# Patient Record
Sex: FEMALE | Race: WHITE | NOT HISPANIC OR LATINO | Employment: OTHER | URBAN - METROPOLITAN AREA
[De-identification: names, ages, dates, MRNs, and addresses within clinical notes are randomized per-mention and may not be internally consistent; named-entity substitution may affect disease eponyms.]

---

## 2017-06-14 ENCOUNTER — OFFICE VISIT (OUTPATIENT)
Dept: ALLERGY | Facility: CLINIC | Age: 77
End: 2017-06-14
Payer: COMMERCIAL

## 2017-06-14 VITALS
SYSTOLIC BLOOD PRESSURE: 106 MMHG | BODY MASS INDEX: 21.4 KG/M2 | WEIGHT: 133.19 LBS | DIASTOLIC BLOOD PRESSURE: 58 MMHG | HEIGHT: 66 IN | HEART RATE: 85 BPM

## 2017-06-14 DIAGNOSIS — E03.9 ACQUIRED HYPOTHYROIDISM: ICD-10-CM

## 2017-06-14 DIAGNOSIS — Z88.0 HISTORY OF PENICILLIN ALLERGY: Primary | ICD-10-CM

## 2017-06-14 DIAGNOSIS — J31.0 CHRONIC RHINITIS: ICD-10-CM

## 2017-06-14 DIAGNOSIS — I10 ESSENTIAL HYPERTENSION: ICD-10-CM

## 2017-06-14 PROCEDURE — 99244 OFF/OP CNSLTJ NEW/EST MOD 40: CPT | Mod: 25,S$GLB,, | Performed by: ALLERGY & IMMUNOLOGY

## 2017-06-14 PROCEDURE — 95018 ALL TSTG PERQ&IQ DRUGS/BIOL: CPT | Mod: S$GLB,,, | Performed by: ALLERGY & IMMUNOLOGY

## 2017-06-14 PROCEDURE — 99999 PR PBB SHADOW E&M-NEW PATIENT-LVL III: CPT | Mod: PBBFAC,,, | Performed by: ALLERGY & IMMUNOLOGY
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
## 2017-06-19 ENCOUNTER — HOSPITAL ENCOUNTER (OUTPATIENT)
Dept: RADIOLOGY | Facility: HOSPITAL | Age: 77
Discharge: HOME | End: 2017-06-19
Attending: INTERNAL MEDICINE
Payer: COMMERCIAL

## 2017-06-19 ENCOUNTER — OFFICE VISIT (OUTPATIENT)
Dept: INFECTIOUS DISEASES | Facility: CLINIC | Age: 77
End: 2017-06-19
Payer: COMMERCIAL

## 2017-06-19 ENCOUNTER — LAB VISIT (OUTPATIENT)
Dept: LAB | Facility: HOSPITAL | Age: 77
End: 2017-06-19
Attending: INTERNAL MEDICINE
Payer: COMMERCIAL

## 2017-06-19 VITALS
BODY MASS INDEX: 21.33 KG/M2 | HEIGHT: 66 IN | TEMPERATURE: 98 F | HEART RATE: 87 BPM | SYSTOLIC BLOOD PRESSURE: 139 MMHG | DIASTOLIC BLOOD PRESSURE: 64 MMHG | WEIGHT: 132.69 LBS

## 2017-06-19 DIAGNOSIS — M27.2 ODONTOGENIC INFECTION OF JAW: ICD-10-CM

## 2017-06-19 LAB
BASOPHILS # BLD AUTO: 0.02 K/UL
BASOPHILS NFR BLD: 0.2 %
CRP SERPL-MCNC: 2.9 MG/L
DIFFERENTIAL METHOD: ABNORMAL
EOSINOPHIL # BLD AUTO: 0.2 K/UL
EOSINOPHIL NFR BLD: 2.9 %
ERYTHROCYTE [DISTWIDTH] IN BLOOD BY AUTOMATED COUNT: 14.3 %
ERYTHROCYTE [SEDIMENTATION RATE] IN BLOOD BY WESTERGREN METHOD: 15 MM/HR
HCT VFR BLD AUTO: 38.9 %
HGB BLD-MCNC: 13.1 G/DL
LYMPHOCYTES # BLD AUTO: 2.3 K/UL
LYMPHOCYTES NFR BLD: 28 %
MCH RBC QN AUTO: 33.4 PG
MCHC RBC AUTO-ENTMCNC: 33.7 %
MCV RBC AUTO: 99 FL
MONOCYTES # BLD AUTO: 0.4 K/UL
MONOCYTES NFR BLD: 4.4 %
NEUTROPHILS # BLD AUTO: 5.3 K/UL
NEUTROPHILS NFR BLD: 64.4 %
PLATELET # BLD AUTO: 303 K/UL
PMV BLD AUTO: 10.2 FL
RBC # BLD AUTO: 3.92 M/UL
WBC # BLD AUTO: 8.22 K/UL

## 2017-06-19 PROCEDURE — 36415 COLL VENOUS BLD VENIPUNCTURE: CPT | Mod: PO

## 2017-06-19 PROCEDURE — 25500020 PHARM REV CODE 255: Performed by: INTERNAL MEDICINE

## 2017-06-19 PROCEDURE — 1126F AMNT PAIN NOTED NONE PRSNT: CPT | Mod: S$GLB,,, | Performed by: INTERNAL MEDICINE

## 2017-06-19 PROCEDURE — 99999 PR PBB SHADOW E&M-EST. PATIENT-LVL III: CPT | Mod: PBBFAC,,, | Performed by: INTERNAL MEDICINE

## 2017-06-19 PROCEDURE — 70488 CT MAXILLOFACIAL W/O & W/DYE: CPT | Mod: 26,,, | Performed by: RADIOLOGY

## 2017-06-19 PROCEDURE — 99204 OFFICE O/P NEW MOD 45 MIN: CPT | Mod: S$GLB,,, | Performed by: INTERNAL MEDICINE

## 2017-06-19 PROCEDURE — 85651 RBC SED RATE NONAUTOMATED: CPT

## 2017-06-19 PROCEDURE — 1159F MED LIST DOCD IN RCRD: CPT | Mod: S$GLB,,, | Performed by: INTERNAL MEDICINE

## 2017-06-19 PROCEDURE — 85025 COMPLETE CBC W/AUTO DIFF WBC: CPT

## 2017-06-19 PROCEDURE — 86140 C-REACTIVE PROTEIN: CPT

## 2017-06-19 PROCEDURE — 70488 CT MAXILLOFACIAL W/O & W/DYE: CPT | Mod: TC

## 2017-06-19 RX ORDER — OXYCODONE AND ACETAMINOPHEN 7.5; 325 MG/1; MG/1
1 TABLET ORAL
Refills: 0 | COMMUNITY
Start: 2017-06-12 | End: 2017-06-19

## 2017-06-19 RX ORDER — METRONIDAZOLE 500 MG/1
500 TABLET ORAL 3 TIMES DAILY
Refills: 0 | COMMUNITY
Start: 2017-06-06 | End: 2017-06-19

## 2017-06-19 RX ORDER — EFINACONAZOLE 100 MG/ML
SOLUTION TOPICAL
COMMUNITY
Start: 2017-06-12 | End: 2017-06-19

## 2017-06-19 RX ORDER — ESCITALOPRAM OXALATE 10 MG/1
TABLET ORAL
COMMUNITY
Start: 2017-05-22 | End: 2017-06-19

## 2017-06-19 RX ORDER — BENZOIN
TINCTURE TOPICAL
Refills: 0 | COMMUNITY
Start: 2017-06-13 | End: 2017-06-19

## 2017-06-19 RX ORDER — HYDROXYZINE PAMOATE 50 MG/1
CAPSULE ORAL
Refills: 0 | COMMUNITY
Start: 2017-05-06 | End: 2017-06-19

## 2017-06-19 RX ORDER — ONDANSETRON 4 MG/1
TABLET, FILM COATED ORAL
Refills: 0 | COMMUNITY
Start: 2017-05-31 | End: 2017-06-19

## 2017-06-19 RX ORDER — ESCITALOPRAM OXALATE 10 MG/1
10 TABLET ORAL
COMMUNITY

## 2017-06-19 RX ORDER — AMOXICILLIN AND CLAVULANATE POTASSIUM 875; 125 MG/1; MG/1
TABLET, FILM COATED ORAL
COMMUNITY
Start: 2017-06-14 | End: 2019-06-03

## 2017-06-19 RX ORDER — CHLORHEXIDINE GLUCONATE ORAL RINSE 1.2 MG/ML
SOLUTION DENTAL
Refills: 0 | COMMUNITY
Start: 2017-05-22 | End: 2017-06-19

## 2017-06-19 RX ORDER — SULFAMETHOXAZOLE AND TRIMETHOPRIM 800; 160 MG/1; MG/1
1 TABLET ORAL 2 TIMES DAILY
Refills: 0 | COMMUNITY
Start: 2017-06-07 | End: 2017-06-19

## 2017-06-19 RX ORDER — IBUPROFEN 800 MG/1
TABLET ORAL
Refills: 0 | COMMUNITY
Start: 2017-05-01 | End: 2017-06-19

## 2017-06-19 RX ORDER — TRAMADOL HYDROCHLORIDE 50 MG/1
50 TABLET ORAL EVERY 6 HOURS PRN
Refills: 0 | COMMUNITY
Start: 2017-05-02 | End: 2017-06-19

## 2017-06-19 RX ADMIN — IOHEXOL 75 ML: 350 INJECTION, SOLUTION INTRAVENOUS at 12:06

## 2017-06-19 NOTE — PROGRESS NOTES
Subjective:      Patient ID: Caity Ashby is a 77 y.o. female.    Chief Complaint:Wound Infection      History of Present Illness   referral from Dr. Amado: [phone]  Bone graft on 5/1: infected; graft removed  On multiple abx : including bactrim, flagyl, cipro, and clindamycin  Saw allergy and had PCN testing done; not allergic  Rxed augmentin and now doing much better.       Review of Systems   Constitution: Positive for weakness and malaise/fatigue. Negative for chills, decreased appetite, fever, night sweats, weight gain and weight loss.   HENT: Negative for congestion, ear pain, headaches, hearing loss, hoarse voice, sore throat and tinnitus.    Eyes: Negative for blurred vision, redness and visual disturbance.   Cardiovascular: Negative for chest pain, leg swelling and palpitations.   Respiratory: Negative for cough, hemoptysis, shortness of breath and sputum production.    Hematologic/Lymphatic: Negative for adenopathy. Does not bruise/bleed easily.   Skin: Negative for dry skin, itching, rash and suspicious lesions.   Musculoskeletal: Negative for back pain, joint pain, myalgias and neck pain.   Gastrointestinal: Negative for abdominal pain, constipation, diarrhea, heartburn, nausea and vomiting.   Genitourinary: Negative for dysuria, flank pain, frequency, hematuria, hesitancy and urgency.   Neurological: Negative for dizziness, numbness and paresthesias.   Psychiatric/Behavioral: Negative for depression and memory loss. The patient does not have insomnia and is not nervous/anxious.      Objective:   Physical Exam   Constitutional: She appears well-developed.   HENT:   Mouth/Throat: No oropharyngeal exudate.   Cardiovascular: Normal rate.    Pulmonary/Chest: Effort normal.   Abdominal: Soft.   Lymphadenopathy:     She has no cervical adenopathy.   Skin: Skin is warm and dry.   Vitals reviewed.    Assessment:       1. Odontogenic infection of jaw          Plan:         1. CT scan.  2. Labs  today.  3. Follow up after the above.

## 2017-06-19 NOTE — LETTER
June 22, 2017      ABEL Anglin III, MD  [address]  [facility] [address]           [facility]  [address]  [facility] [address]  Phone: [phone]  Fax: [phone]          Patient: Caity Ashby   MR Number: [id number]   YOB: 1940   Date of Visit: 6/19/2017       Dear Dr. ABEL Anglin III:    Thank you for referring Caity Ashby to me for evaluation. Attached you will find relevant portions of my assessment and plan of care.    If you have questions, please do not hesitate to call me. I look forward to following Caity Ashby along with you.    Sincerely,    Megha Harris  CC:  No Recipients    If you would like to receive this communication electronically, please contact [email or web address] or [phone] to request more information on [facility] access.    For providers and/or their staff who would like to refer a patient to [facility], please contact us through our one-stop-shop provider referral line, [facility], at [phone].    If you feel you have received this communication in error or would no longer like to receive these types of communications, please e-mail [email or web address]

## 2017-12-27 ENCOUNTER — CLINICAL SUPPORT (OUTPATIENT)
Dept: CARDIOLOGY | Facility: CLINIC | Age: 77
End: 2017-12-27
Payer: COMMERCIAL

## 2017-12-27 DIAGNOSIS — I10 ESSENTIAL HYPERTENSION: ICD-10-CM

## 2017-12-27 PROCEDURE — 93351 STRESS TTE COMPLETE: CPT | Mod: S$GLB,,, | Performed by: INTERNAL MEDICINE

## 2018-06-27 ENCOUNTER — OFFICE VISIT (OUTPATIENT)
Dept: CARDIOLOGY | Facility: CLINIC | Age: 78
End: 2018-06-27
Attending: INTERNAL MEDICINE
Payer: COMMERCIAL

## 2018-06-27 VITALS
HEART RATE: 70 BPM | HEIGHT: 66 IN | SYSTOLIC BLOOD PRESSURE: 144 MMHG | BODY MASS INDEX: 21.69 KG/M2 | DIASTOLIC BLOOD PRESSURE: 62 MMHG | WEIGHT: 135 LBS

## 2018-06-27 DIAGNOSIS — I10 ESSENTIAL HYPERTENSION: Primary | ICD-10-CM

## 2018-06-27 DIAGNOSIS — E03.9 ACQUIRED HYPOTHYROIDISM: ICD-10-CM

## 2018-06-27 PROCEDURE — 3078F DIAST BP <80 MM HG: CPT | Mod: CPTII,S$GLB,, | Performed by: INTERNAL MEDICINE

## 2018-06-27 PROCEDURE — 3077F SYST BP >= 140 MM HG: CPT | Mod: CPTII,S$GLB,, | Performed by: INTERNAL MEDICINE

## 2018-06-27 PROCEDURE — 99214 OFFICE O/P EST MOD 30 MIN: CPT | Mod: S$GLB,,, | Performed by: INTERNAL MEDICINE

## 2018-06-27 NOTE — PROGRESS NOTES
Subjective:    Patient ID:  Caity Ashby is a 78 y.o. female     HPI  here for follow-up of essential hypertension, acquired hypothyroidism.    I feel well.  We had to move out of our home with 21 boxes, when our house had been tented for termite treatment.  I have not had the time to work out like I normally do.  I was advised to take Topamax for my migraine, and this does not suit me.  I have gone back to taking Imitrex.    Current Outpatient Prescriptions   Medication Sig    amoxicillin-clavulanate 875-125mg (AUGMENTIN) 875-125 mg per tablet     escitalopram oxalate (LEXAPRO) 10 MG tablet Take 10 mg by mouth.    estrogens, conjugated, (PREMARIN) 0.625 MG tablet Take 0.625 mg by mouth once daily.    finasteride (PROPECIA) 1 mg tablet Take 1 mg by mouth.     levothyroxine (SYNTHROID) 125 MCG tablet take 1 tablet by mouth every morning    ramipril (ALTACE) 10 MG capsule TAKE 1 CAPSULE DAILY    sumatriptan (IMITREX) 100 MG tablet      No current facility-administered medications for this visit.        Review of Systems   Constitution: Negative for chills, decreased appetite, fever, weight gain and weight loss.   HENT: Negative for congestion, hearing loss and sore throat.    Eyes: Negative for blurred vision, double vision and visual disturbance.   Cardiovascular: Negative for chest pain, claudication, dyspnea on exertion, leg swelling, palpitations and syncope.   Respiratory: Negative for cough, hemoptysis, shortness of breath, sputum production and wheezing.    Endocrine: Negative for cold intolerance and heat intolerance.   Hematologic/Lymphatic: Negative for bleeding problem. Does not bruise/bleed easily.   Skin: Negative for color change, dry skin, flushing and itching.   Musculoskeletal: Negative for back pain, joint pain and myalgias.   Gastrointestinal: Negative for abdominal pain, anorexia, constipation, diarrhea, dysphagia, nausea and vomiting.        No bleeding per rectum   Genitourinary:  "Negative for dysuria, flank pain, frequency, hematuria and nocturia.   Neurological: Positive for headaches. Negative for dizziness, light-headedness, loss of balance, seizures and tremors.   Psychiatric/Behavioral: Negative for altered mental status and depression.         Vitals:    06/27/18 1202   BP: (!) 144/62   Pulse: 70   Weight: 61.2 kg (135 lb)   Height: 5' 6" (1.676 m)     Objective:    Physical Exam   Constitutional: She is oriented to person, place, and time. She appears well-developed and well-nourished.   HENT:   Head: Normocephalic and atraumatic.   Nose: Nose normal.   Mouth/Throat: Oropharynx is clear and moist.   Eyes: Conjunctivae and EOM are normal. Pupils are equal, round, and reactive to light.   Neck: Neck supple. No tracheal deviation present. No thyromegaly present.   Cardiovascular: Normal rate, regular rhythm and intact distal pulses.  Exam reveals no gallop and no friction rub.    No murmur heard.  Pulmonary/Chest: No respiratory distress. She has no wheezes. She has no rales. She exhibits no tenderness.   Abdominal: Soft. Bowel sounds are normal. She exhibits no distension and no mass. There is no tenderness. There is no rebound and no guarding.   Musculoskeletal: Normal range of motion.   Lymphadenopathy:     She has no cervical adenopathy.   Neurological: She is alert and oriented to person, place, and time.   Skin: Skin is warm and dry.   Psychiatric: Her behavior is normal.         Assessment:       1. Essential hypertension    2. Acquired hypothyroidism    3.      Migraine.     Plan:       Stable.  Reassured.  Continue the same medical regimen.            "

## 2018-07-03 RX ORDER — VALACYCLOVIR HYDROCHLORIDE 500 MG/1
TABLET, FILM COATED ORAL
Qty: 100 TABLET | Refills: 6 | Status: SHIPPED | OUTPATIENT
Start: 2018-07-03 | End: 2019-09-16 | Stop reason: SDUPTHER

## 2018-07-12 ENCOUNTER — PATIENT MESSAGE (OUTPATIENT)
Dept: CARDIOLOGY | Facility: CLINIC | Age: 78
End: 2018-07-12

## 2018-12-19 ENCOUNTER — PATIENT MESSAGE (OUTPATIENT)
Dept: CARDIOLOGY | Facility: CLINIC | Age: 78
End: 2018-12-19

## 2018-12-19 ENCOUNTER — OFFICE VISIT (OUTPATIENT)
Dept: CARDIOLOGY | Facility: CLINIC | Age: 78
End: 2018-12-19
Attending: INTERNAL MEDICINE
Payer: COMMERCIAL

## 2018-12-19 VITALS
WEIGHT: 138 LBS | HEART RATE: 76 BPM | SYSTOLIC BLOOD PRESSURE: 144 MMHG | HEIGHT: 66 IN | BODY MASS INDEX: 22.18 KG/M2 | DIASTOLIC BLOOD PRESSURE: 64 MMHG

## 2018-12-19 DIAGNOSIS — E03.9 ACQUIRED HYPOTHYROIDISM: ICD-10-CM

## 2018-12-19 DIAGNOSIS — T46.1X5D: ICD-10-CM

## 2018-12-19 DIAGNOSIS — I10 ESSENTIAL HYPERTENSION: Primary | ICD-10-CM

## 2018-12-19 DIAGNOSIS — M17.12 PRIMARY OSTEOARTHRITIS OF LEFT KNEE: ICD-10-CM

## 2018-12-19 PROCEDURE — 3077F SYST BP >= 140 MM HG: CPT | Mod: CPTII,S$GLB,, | Performed by: INTERNAL MEDICINE

## 2018-12-19 PROCEDURE — 3078F DIAST BP <80 MM HG: CPT | Mod: CPTII,S$GLB,, | Performed by: INTERNAL MEDICINE

## 2018-12-19 PROCEDURE — 99213 OFFICE O/P EST LOW 20 MIN: CPT | Mod: S$GLB,,, | Performed by: INTERNAL MEDICINE

## 2018-12-19 NOTE — PROGRESS NOTES
Subjective:    Patient ID:  Caity Ashby is a 78 y.o. female     HPI   Here for follow-up of essential hypertension, hypothyroidism, adverse effect of calcium channel blocker, osteoarthritis of left knee.    Aside from my left knee pain, I am doing well.  I have no other complaint.    Current Outpatient Medications   Medication Sig    amoxicillin-clavulanate 875-125mg (AUGMENTIN) 875-125 mg per tablet     escitalopram oxalate (LEXAPRO) 10 MG tablet Take 10 mg by mouth.    estrogens, conjugated, (PREMARIN) 0.625 MG tablet Take 0.625 mg by mouth once daily.    finasteride (PROPECIA) 1 mg tablet Take 1 mg by mouth.     levothyroxine (SYNTHROID) 125 MCG tablet take 1 tablet by mouth every morning    ramipril (ALTACE) 10 MG capsule TAKE 1 CAPSULE DAILY    sumatriptan (IMITREX) 100 MG tablet     valACYclovir (VALTREX) 500 MG tablet take 1 tablet by mouth once daily     No current facility-administered medications for this visit.          Review of Systems   Constitution: Negative for chills, decreased appetite, fever, weight gain and weight loss.   HENT: Negative for congestion, hearing loss and sore throat.    Eyes: Negative for blurred vision, double vision and visual disturbance.   Cardiovascular: Negative for chest pain, claudication, dyspnea on exertion, leg swelling, palpitations and syncope.   Respiratory: Negative for cough, hemoptysis, shortness of breath, sputum production and wheezing.    Endocrine: Negative for cold intolerance and heat intolerance.   Hematologic/Lymphatic: Negative for bleeding problem. Does not bruise/bleed easily.   Skin: Negative for color change, dry skin, flushing and itching.   Musculoskeletal: Positive for joint pain. Negative for back pain and myalgias.   Gastrointestinal: Negative for abdominal pain, anorexia, constipation, diarrhea, dysphagia, nausea and vomiting.        No bleeding per rectum   Genitourinary: Negative for dysuria, flank pain, frequency, hematuria  "and nocturia.   Neurological: Negative for dizziness, headaches, light-headedness, loss of balance, seizures and tremors.   Psychiatric/Behavioral: Negative for altered mental status and depression.         Vitals:    12/19/18 1201   BP: (!) 144/64   Pulse: 76   Weight: 62.6 kg (138 lb)   Height: 5' 6" (1.676 m)     Objective:    Physical Exam   Constitutional: She is oriented to person, place, and time. She appears well-developed and well-nourished.   HENT:   Head: Normocephalic and atraumatic.   Nose: Nose normal.   Mouth/Throat: Oropharynx is clear and moist.   Eyes: Conjunctivae and EOM are normal. Pupils are equal, round, and reactive to light.   Neck: Neck supple. No tracheal deviation present. No thyromegaly present.   Cardiovascular: Normal rate, regular rhythm and intact distal pulses. Exam reveals no gallop and no friction rub.   No murmur heard.  Pulmonary/Chest: No respiratory distress. She has no wheezes. She has no rales. She exhibits no tenderness.   Abdominal: Soft. Bowel sounds are normal. She exhibits no distension and no mass. There is no tenderness. There is no rebound and no guarding.   Musculoskeletal: Normal range of motion.   Lymphadenopathy:     She has no cervical adenopathy.   Neurological: She is alert and oriented to person, place, and time.   Skin: Skin is warm and dry.   Psychiatric: Her behavior is normal.         Assessment:       1. Essential hypertension    2. Acquired hypothyroidism    3. Adverse effect of calcium-channel blocker, subsequent encounter    4. Primary osteoarthritis of left knee         Plan:       Stable.  Continue the same pharmacological regimen.            "

## 2019-05-18 DIAGNOSIS — E03.9 HYPOTHYROIDISM: ICD-10-CM

## 2019-05-20 RX ORDER — LEVOTHYROXINE SODIUM 125 UG/1
TABLET ORAL
Qty: 90 TABLET | Refills: 0 | Status: SHIPPED | OUTPATIENT
Start: 2019-05-20 | End: 2019-08-16 | Stop reason: SDUPTHER

## 2019-05-24 DIAGNOSIS — I10 ESSENTIAL HYPERTENSION: Primary | ICD-10-CM

## 2019-05-24 RX ORDER — LOSARTAN POTASSIUM 100 MG/1
100 TABLET ORAL DAILY
Qty: 90 TABLET | Refills: 3 | Status: SHIPPED | OUTPATIENT
Start: 2019-05-24 | End: 2020-05-18

## 2019-05-24 RX ORDER — LOSARTAN POTASSIUM 100 MG/1
100 TABLET ORAL DAILY
COMMUNITY
End: 2019-05-24 | Stop reason: SDUPTHER

## 2019-06-03 ENCOUNTER — OFFICE VISIT (OUTPATIENT)
Dept: ALLERGY | Facility: CLINIC | Age: 79
End: 2019-06-03
Payer: COMMERCIAL

## 2019-06-03 VITALS
DIASTOLIC BLOOD PRESSURE: 70 MMHG | SYSTOLIC BLOOD PRESSURE: 118 MMHG | WEIGHT: 136 LBS | BODY MASS INDEX: 21.86 KG/M2 | HEIGHT: 66 IN

## 2019-06-03 DIAGNOSIS — E03.9 ACQUIRED HYPOTHYROIDISM: ICD-10-CM

## 2019-06-03 DIAGNOSIS — T78.3XXA ANGIOEDEMA, INITIAL ENCOUNTER: Primary | ICD-10-CM

## 2019-06-03 DIAGNOSIS — T50.905A ADVERSE EFFECT OF DRUG, INITIAL ENCOUNTER: ICD-10-CM

## 2019-06-03 DIAGNOSIS — I10 ESSENTIAL HYPERTENSION: ICD-10-CM

## 2019-06-03 PROCEDURE — 3078F PR MOST RECENT DIASTOLIC BLOOD PRESSURE < 80 MM HG: ICD-10-PCS | Mod: CPTII,S$GLB,, | Performed by: ALLERGY & IMMUNOLOGY

## 2019-06-03 PROCEDURE — 99999 PR PBB SHADOW E&M-EST. PATIENT-LVL III: CPT | Mod: PBBFAC,,, | Performed by: ALLERGY & IMMUNOLOGY

## 2019-06-03 PROCEDURE — 3078F DIAST BP <80 MM HG: CPT | Mod: CPTII,S$GLB,, | Performed by: ALLERGY & IMMUNOLOGY

## 2019-06-03 PROCEDURE — 3074F SYST BP LT 130 MM HG: CPT | Mod: CPTII,S$GLB,, | Performed by: ALLERGY & IMMUNOLOGY

## 2019-06-03 PROCEDURE — 99214 OFFICE O/P EST MOD 30 MIN: CPT | Mod: S$GLB,,, | Performed by: ALLERGY & IMMUNOLOGY

## 2019-06-03 PROCEDURE — 3074F PR MOST RECENT SYSTOLIC BLOOD PRESSURE < 130 MM HG: ICD-10-PCS | Mod: CPTII,S$GLB,, | Performed by: ALLERGY & IMMUNOLOGY

## 2019-06-03 PROCEDURE — 99999 PR PBB SHADOW E&M-EST. PATIENT-LVL III: ICD-10-PCS | Mod: PBBFAC,,, | Performed by: ALLERGY & IMMUNOLOGY

## 2019-06-03 PROCEDURE — 99214 PR OFFICE/OUTPT VISIT, EST, LEVL IV, 30-39 MIN: ICD-10-PCS | Mod: S$GLB,,, | Performed by: ALLERGY & IMMUNOLOGY

## 2019-06-03 NOTE — PROGRESS NOTES
Caity Carver returns to clinic today for continued evaluation of a drug allergy.  She is here with her  who was also a patient.  She was last seen June 14, 2017.    At her last visit, she was tested for penicillin was negative.  She subsequently has had on several occasions without any difficulty.    She has been on ramipril for 15 years for hypertension.    On May 25, 2019 she woke up around 2:00 a.m. and had some swelling of her face.  She went back to sleep but when she woke up around 8:00 a.m. she had had more significant swelling.  She did not have any urticaria.    She went to the urgent care center and was told that it was probably due to her ACE inhibitor.  She was given prednisone, famotidine, and Benadryl with resolution.    She has not had since.    She is now taking losartan.    She is concerned about a possible shrimp allergy.  She had eaten them the day before for luncharound noon.  She had leftover is that night around 7:00 p.m..    She has been avoiding all crustacea and bivalves.    She denies any rhinitis or conjunctivitis.  She denies any cough, wheezing, or shortness of breath.    [facility] ALLERGY QUESTIONNAIRE SHORT 6/3/2019   Are you taking any new medications since your last visit? Yes   Constitution: No changes since my last visit with this provider   Head or facial pain: No changes since my last visit with this provider   Eyes: No symptoms   Ears: No symptoms   Nose: No symptoms   Throat: No symptoms   Sinuses: No symptoms   Lungs: No symptoms   Skin: Swelling   Cardiovascular: High blood pressue   Gastrointestinal: No symptoms   Genital/ urinary No symptoms   Musculoskeletal: No symptoms   Neurologic: No symptoms   Endocrine: No symptoms   Hematologic: No symptoms     Physical Examination:  General: Well-developed, well-nourished, no acute distress.  Head: No sinus tenderness.  Eyes: Conjunctivae:  No bulbar or palpebral conjunctival injection.  Ears: EAC's clear.  TM's  clear.  No pre-auricular nodes.  Nose: Nasal Mucosa:  Pink.  Septum: No apparent deviation.  Turbinates:  No significant edema.  Polyps/Mass:  None visible.  Teeth/Gums:  No bleeding noted.  Oropharynx: Lower left gum with purulence.  Neck: Supple without thyromegaly. No cervical lymphadenopathy.    Respiratory/Chest: Effort: Good.  Auscultation:  Clear bilaterally.  Skin: Good turgor.  No urticaria or angioedema.  Neuro/Psych: Oriented x 3.    Penicillin skin testing prick and intradermal: 3+ histamine control. All tests were negative.    Assessment:  1.  Acute angioedema, probably secondary to Ace inhibitor.  2.  Doubt food allergy.  3. History of penicillin allergy with negative skin tests and subsequent tolerance of Penicillin.  4. S/P bone graft with local infection.  5. History of allergic rhinitis.  6. Hypertension on Altace.  7. Hypothyroidism on replacement.    Recommendations:  1.  Laboratory as ordered.    2.  Avoid crustacea and bivalves for now.  3.  Skin test off antihistamines if needed.  4.  Return to clinic in one week.

## 2019-06-13 ENCOUNTER — OFFICE VISIT (OUTPATIENT)
Dept: ALLERGY | Facility: CLINIC | Age: 79
End: 2019-06-13
Payer: COMMERCIAL

## 2019-06-13 VITALS
OXYGEN SATURATION: 98 % | SYSTOLIC BLOOD PRESSURE: 140 MMHG | DIASTOLIC BLOOD PRESSURE: 80 MMHG | HEART RATE: 59 BPM | BODY MASS INDEX: 22.46 KG/M2 | HEIGHT: 66 IN | WEIGHT: 139.75 LBS

## 2019-06-13 DIAGNOSIS — E03.9 ACQUIRED HYPOTHYROIDISM: ICD-10-CM

## 2019-06-13 DIAGNOSIS — T78.3XXD ANGIOEDEMA, SUBSEQUENT ENCOUNTER: Primary | ICD-10-CM

## 2019-06-13 DIAGNOSIS — T50.905D ADVERSE EFFECT OF DRUG, SUBSEQUENT ENCOUNTER: ICD-10-CM

## 2019-06-13 DIAGNOSIS — I10 ESSENTIAL HYPERTENSION: ICD-10-CM

## 2019-06-13 PROCEDURE — 1101F PR PT FALLS ASSESS DOC 0-1 FALLS W/OUT INJ PAST YR: ICD-10-PCS | Mod: CPTII,S$GLB,, | Performed by: ALLERGY & IMMUNOLOGY

## 2019-06-13 PROCEDURE — 99999 PR PBB SHADOW E&M-EST. PATIENT-LVL III: ICD-10-PCS | Mod: PBBFAC,,, | Performed by: ALLERGY & IMMUNOLOGY

## 2019-06-13 PROCEDURE — 3077F SYST BP >= 140 MM HG: CPT | Mod: CPTII,S$GLB,, | Performed by: ALLERGY & IMMUNOLOGY

## 2019-06-13 PROCEDURE — 3077F PR MOST RECENT SYSTOLIC BLOOD PRESSURE >= 140 MM HG: ICD-10-PCS | Mod: CPTII,S$GLB,, | Performed by: ALLERGY & IMMUNOLOGY

## 2019-06-13 PROCEDURE — 3079F DIAST BP 80-89 MM HG: CPT | Mod: CPTII,S$GLB,, | Performed by: ALLERGY & IMMUNOLOGY

## 2019-06-13 PROCEDURE — 99214 OFFICE O/P EST MOD 30 MIN: CPT | Mod: S$GLB,,, | Performed by: ALLERGY & IMMUNOLOGY

## 2019-06-13 PROCEDURE — 99999 PR PBB SHADOW E&M-EST. PATIENT-LVL III: CPT | Mod: PBBFAC,,, | Performed by: ALLERGY & IMMUNOLOGY

## 2019-06-13 PROCEDURE — 3079F PR MOST RECENT DIASTOLIC BLOOD PRESSURE 80-89 MM HG: ICD-10-PCS | Mod: CPTII,S$GLB,, | Performed by: ALLERGY & IMMUNOLOGY

## 2019-06-13 PROCEDURE — 99214 PR OFFICE/OUTPT VISIT, EST, LEVL IV, 30-39 MIN: ICD-10-PCS | Mod: S$GLB,,, | Performed by: ALLERGY & IMMUNOLOGY

## 2019-06-13 PROCEDURE — 1101F PT FALLS ASSESS-DOCD LE1/YR: CPT | Mod: CPTII,S$GLB,, | Performed by: ALLERGY & IMMUNOLOGY

## 2019-06-13 RX ORDER — EPINEPHRINE 0.3 MG/.3ML
1 INJECTION SUBCUTANEOUS ONCE
Qty: 2 DEVICE | Refills: 5 | Status: SHIPPED | OUTPATIENT
Start: 2019-06-13 | End: 2023-01-21

## 2019-06-13 NOTE — PROGRESS NOTES
Caity Carver returns to clinic today for continued evaluation of angioedema.  She is here with her  who was also a patient.  She was last seen Cinthia 3, 2019.    She had an episode of angioedema on May 25, 2019.  She was on ramipril and this was discontinued.  She had also eaten shrimp that day and was concerned about a possible food allergy.    She has not had any further episodes of swelling.  She has not had any urticaria.    She saw her results of her blood work online and they were negative.  We had scheduled skin testing for today.    She however when had ate shrimp without any difficulty.  She also has been eating oysters and lobster.    She has no longer concerned about a food allergy.    She does not have an EpiPen.  Her  would feel more comfortable if she did.    She denies any rhinitis or conjunctivitis.  She denies any cough, wheezing, or shortness of breath.  She denies any asthma.    [facility] ALLERGY QUESTIONNAIRE SHORT 6/13/2019   Are you taking any new medications since your last visit? Yes   Constitution: No changes since my last visit with this provider   Head or facial pain: No changes since my last visit with this provider   Eyes: No symptoms   Ears: No symptoms   Nose: Runny nose   Throat: No symptoms   Sinuses: No symptoms   Lungs: No symptoms   Skin: Swelling   Cardiovascular: High blood pressue   Gastrointestinal: No symptoms   Genital/ urinary No symptoms   Musculoskeletal: No symptoms   Neurologic: No symptoms   Endocrine: No symptoms   Hematologic: No symptoms     Physical Examination:  General: Well-developed, well-nourished, no acute distress.  Head: No sinus tenderness.  Eyes: Conjunctivae:  No bulbar or palpebral conjunctival injection.  Ears: EAC's clear.  TM's clear.  No pre-auricular nodes.  Nose: Nasal Mucosa:  Pink.  Septum: No apparent deviation.  Turbinates:  No significant edema.  Polyps/Mass:  None visible.  Teeth/Gums:  No bleeding noted.  Oropharynx:  Lower left gum with purulence.  Neck: Supple without thyromegaly. No cervical lymphadenopathy.    Respiratory/Chest: Effort: Good.  Auscultation:  Clear bilaterally.  Skin: Good turgor.  No urticaria or angioedema.  Neuro/Psych: Oriented x 3.    Penicillin skin testing prick and intradermal: 3+ histamine control. All tests were negative.    Assessment:  1.  Acute angioedema, probably secondary to ACE inhibitor, resolved.  2.  Doubt food allergy.  3.  History of penicillin allergy with negative skin tests and subsequent tolerance of Penicillin.  4.  S/P bone graft with local infection.  5.  History of allergic rhinitis.  6.  Hypertension on Altace.  7.  Hypothyroidism on replacement.    Recommendations:  1.  Discussed skin testing today and as she has eaten shrimp, lobster, and oyster without any difficulty we will not do these.  2.  EpiPen.  3.  Discussed that reactions to ACE inhibitors may occur after this drug is discontinued.  4.  Resume normal diet.  5.  Return to clinic as needed.    EpiPen technique was reviewed.

## 2019-06-19 ENCOUNTER — OFFICE VISIT (OUTPATIENT)
Dept: CARDIOLOGY | Facility: CLINIC | Age: 79
End: 2019-06-19
Attending: INTERNAL MEDICINE
Payer: COMMERCIAL

## 2019-06-19 VITALS
DIASTOLIC BLOOD PRESSURE: 70 MMHG | BODY MASS INDEX: 22.5 KG/M2 | HEIGHT: 66 IN | SYSTOLIC BLOOD PRESSURE: 135 MMHG | WEIGHT: 140 LBS | HEART RATE: 67 BPM

## 2019-06-19 DIAGNOSIS — T46.1X5D: ICD-10-CM

## 2019-06-19 DIAGNOSIS — T46.4X5A ANGIOEDEMA DUE TO ANGIOTENSIN CONVERTING ENZYME INHIBITOR (ACE-I): ICD-10-CM

## 2019-06-19 DIAGNOSIS — T78.3XXA ANGIOEDEMA DUE TO ANGIOTENSIN CONVERTING ENZYME INHIBITOR (ACE-I): ICD-10-CM

## 2019-06-19 DIAGNOSIS — I10 ESSENTIAL HYPERTENSION: Primary | ICD-10-CM

## 2019-06-19 DIAGNOSIS — E03.9 ACQUIRED HYPOTHYROIDISM: ICD-10-CM

## 2019-06-19 PROCEDURE — 1101F PT FALLS ASSESS-DOCD LE1/YR: CPT | Mod: CPTII,S$GLB,, | Performed by: INTERNAL MEDICINE

## 2019-06-19 PROCEDURE — 3078F DIAST BP <80 MM HG: CPT | Mod: CPTII,S$GLB,, | Performed by: INTERNAL MEDICINE

## 2019-06-19 PROCEDURE — 1101F PR PT FALLS ASSESS DOC 0-1 FALLS W/OUT INJ PAST YR: ICD-10-PCS | Mod: CPTII,S$GLB,, | Performed by: INTERNAL MEDICINE

## 2019-06-19 PROCEDURE — 3075F PR MOST RECENT SYSTOLIC BLOOD PRESS GE 130-139MM HG: ICD-10-PCS | Mod: CPTII,S$GLB,, | Performed by: INTERNAL MEDICINE

## 2019-06-19 PROCEDURE — 3078F PR MOST RECENT DIASTOLIC BLOOD PRESSURE < 80 MM HG: ICD-10-PCS | Mod: CPTII,S$GLB,, | Performed by: INTERNAL MEDICINE

## 2019-06-19 PROCEDURE — 99214 OFFICE O/P EST MOD 30 MIN: CPT | Mod: S$GLB,,, | Performed by: INTERNAL MEDICINE

## 2019-06-19 PROCEDURE — 99214 PR OFFICE/OUTPT VISIT, EST, LEVL IV, 30-39 MIN: ICD-10-PCS | Mod: S$GLB,,, | Performed by: INTERNAL MEDICINE

## 2019-06-19 PROCEDURE — 3075F SYST BP GE 130 - 139MM HG: CPT | Mod: CPTII,S$GLB,, | Performed by: INTERNAL MEDICINE

## 2019-06-20 NOTE — PROGRESS NOTES
Subjective:    Patient ID:  Caity Ashby is a 79 y.o. female     HPI   Here for follow-up of hypertension, hypothyroidism, recent angioedema due to angiotensin-converting enzyme inhibitor, adverse effect of calcium channel blocker.    After the angioedema, I stopped the ramipril and have tolerated losartan pretty well.  I went to see Dr. Anglin, and he ruled out an allergy to seafood.  My left knee hurts but topical magnesium seems to help.  I am due to have oral surgery.    Current Outpatient Medications   Medication Sig    EPINEPHrine (EPIPEN) 0.3 mg/0.3 mL AtIn Inject 0.3 mLs (0.3 mg total) into the muscle once. for 1 dose    escitalopram oxalate (LEXAPRO) 10 MG tablet Take 10 mg by mouth.    estrogens, conjugated, (PREMARIN) 0.625 MG tablet Take 0.625 mg by mouth once daily.    finasteride (PROPECIA) 1 mg tablet Take 1 mg by mouth.     levothyroxine (SYNTHROID) 125 MCG tablet TAKE 1 TABLET BY MOUTH EVERY MORNING    losartan (COZAAR) 100 MG tablet Take 1 tablet (100 mg total) by mouth once daily.    sumatriptan (IMITREX) 100 MG tablet     valACYclovir (VALTREX) 500 MG tablet take 1 tablet by mouth once daily     No current facility-administered medications for this visit.          Review of Systems   Constitution: Negative for chills, decreased appetite, fever, weight gain and weight loss.   HENT: Negative for congestion, hearing loss and sore throat.    Eyes: Negative for blurred vision, double vision and visual disturbance.   Cardiovascular: Negative for chest pain, claudication, dyspnea on exertion, leg swelling, palpitations and syncope.   Respiratory: Negative for cough, hemoptysis, shortness of breath, sputum production and wheezing.    Endocrine: Negative for cold intolerance and heat intolerance.   Hematologic/Lymphatic: Negative for bleeding problem. Does not bruise/bleed easily.   Skin: Negative for color change, dry skin, flushing and itching.   Musculoskeletal: Negative for back pain,  "joint pain and myalgias.   Gastrointestinal: Negative for abdominal pain, anorexia, constipation, diarrhea, dysphagia, nausea and vomiting.        No bleeding per rectum   Genitourinary: Negative for dysuria, flank pain, frequency, hematuria and nocturia.   Neurological: Negative for dizziness, headaches, light-headedness, loss of balance, seizures and tremors.   Psychiatric/Behavioral: Negative for altered mental status and depression.         Vitals:    06/19/19 1222   BP: 135/70   Pulse: 67   Weight: 63.5 kg (140 lb)   Height: 5' 6" (1.676 m)     Objective:    Physical Exam   Constitutional: She is oriented to person, place, and time. She appears well-developed and well-nourished.   HENT:   Head: Normocephalic and atraumatic.   Nose: Nose normal.   Mouth/Throat: Oropharynx is clear and moist.   Eyes: Pupils are equal, round, and reactive to light. Conjunctivae and EOM are normal.   Neck: Neck supple. No tracheal deviation present. No thyromegaly present.   Cardiovascular: Normal rate, regular rhythm and intact distal pulses. Exam reveals no gallop and no friction rub.   No murmur heard.  Pulmonary/Chest: No respiratory distress. She has no wheezes. She has no rales. She exhibits no tenderness.   Abdominal: Soft. Bowel sounds are normal. She exhibits no distension and no mass. There is no tenderness. There is no rebound and no guarding.   Musculoskeletal: Normal range of motion.   Lymphadenopathy:     She has no cervical adenopathy.   Neurological: She is alert and oriented to person, place, and time.   Skin: Skin is warm and dry.   Psychiatric: Her behavior is normal.         Assessment:       1. Essential hypertension    2. Angioedema due to angiotensin converting enzyme inhibitor (ACE-I)    3. Acquired hypothyroidism    4. Adverse effect of calcium-channel blocker, subsequent encounter         Plan:       Stable.  Continue present pharmacological regimen.            "

## 2019-08-16 DIAGNOSIS — E03.9 HYPOTHYROIDISM: ICD-10-CM

## 2019-08-19 RX ORDER — LEVOTHYROXINE SODIUM 125 UG/1
TABLET ORAL
Qty: 90 TABLET | Refills: 0 | Status: SHIPPED | OUTPATIENT
Start: 2019-08-19 | End: 2019-11-13 | Stop reason: SDUPTHER

## 2019-09-16 RX ORDER — VALACYCLOVIR HYDROCHLORIDE 500 MG/1
TABLET, FILM COATED ORAL
Qty: 100 TABLET | Refills: 0 | Status: SHIPPED | OUTPATIENT
Start: 2019-09-16 | End: 2020-03-17

## 2019-09-30 ENCOUNTER — OFFICE VISIT (OUTPATIENT)
Dept: CARDIOLOGY | Facility: CLINIC | Age: 79
End: 2019-09-30
Attending: INTERNAL MEDICINE
Payer: COMMERCIAL

## 2019-09-30 VITALS
HEART RATE: 68 BPM | HEIGHT: 66 IN | WEIGHT: 141 LBS | BODY MASS INDEX: 22.66 KG/M2 | DIASTOLIC BLOOD PRESSURE: 70 MMHG | SYSTOLIC BLOOD PRESSURE: 140 MMHG

## 2019-09-30 DIAGNOSIS — T46.1X5D: ICD-10-CM

## 2019-09-30 DIAGNOSIS — T46.4X5A ANGIOEDEMA DUE TO ANGIOTENSIN CONVERTING ENZYME INHIBITOR (ACE-I): ICD-10-CM

## 2019-09-30 DIAGNOSIS — E03.9 ACQUIRED HYPOTHYROIDISM: ICD-10-CM

## 2019-09-30 DIAGNOSIS — T78.3XXA ANGIOEDEMA DUE TO ANGIOTENSIN CONVERTING ENZYME INHIBITOR (ACE-I): ICD-10-CM

## 2019-09-30 DIAGNOSIS — I10 ESSENTIAL HYPERTENSION: ICD-10-CM

## 2019-09-30 DIAGNOSIS — I10 ESSENTIAL HYPERTENSION: Primary | ICD-10-CM

## 2019-09-30 PROCEDURE — 1101F PR PT FALLS ASSESS DOC 0-1 FALLS W/OUT INJ PAST YR: ICD-10-PCS | Mod: CPTII,S$GLB,, | Performed by: INTERNAL MEDICINE

## 2019-09-30 PROCEDURE — 99214 PR OFFICE/OUTPT VISIT, EST, LEVL IV, 30-39 MIN: ICD-10-PCS | Mod: 25,S$GLB,, | Performed by: INTERNAL MEDICINE

## 2019-09-30 PROCEDURE — 93000 PR ELECTROCARDIOGRAM, COMPLETE: ICD-10-PCS | Mod: S$GLB,,, | Performed by: INTERNAL MEDICINE

## 2019-09-30 PROCEDURE — 3077F PR MOST RECENT SYSTOLIC BLOOD PRESSURE >= 140 MM HG: ICD-10-PCS | Mod: CPTII,S$GLB,, | Performed by: INTERNAL MEDICINE

## 2019-09-30 PROCEDURE — 1101F PT FALLS ASSESS-DOCD LE1/YR: CPT | Mod: CPTII,S$GLB,, | Performed by: INTERNAL MEDICINE

## 2019-09-30 PROCEDURE — 3078F PR MOST RECENT DIASTOLIC BLOOD PRESSURE < 80 MM HG: ICD-10-PCS | Mod: CPTII,S$GLB,, | Performed by: INTERNAL MEDICINE

## 2019-09-30 PROCEDURE — 3078F DIAST BP <80 MM HG: CPT | Mod: CPTII,S$GLB,, | Performed by: INTERNAL MEDICINE

## 2019-09-30 PROCEDURE — 99214 OFFICE O/P EST MOD 30 MIN: CPT | Mod: 25,S$GLB,, | Performed by: INTERNAL MEDICINE

## 2019-09-30 PROCEDURE — 3077F SYST BP >= 140 MM HG: CPT | Mod: CPTII,S$GLB,, | Performed by: INTERNAL MEDICINE

## 2019-09-30 PROCEDURE — 93000 ELECTROCARDIOGRAM COMPLETE: CPT | Mod: S$GLB,,, | Performed by: INTERNAL MEDICINE

## 2019-09-30 RX ORDER — DILTIAZEM HYDROCHLORIDE 120 MG/1
120 CAPSULE, EXTENDED RELEASE ORAL DAILY
COMMUNITY
End: 2019-09-30 | Stop reason: SDUPTHER

## 2019-09-30 RX ORDER — HYDROCHLOROTHIAZIDE 12.5 MG/1
12.5 TABLET ORAL DAILY
Qty: 30 TABLET | Refills: 6 | Status: SHIPPED | OUTPATIENT
Start: 2019-09-30 | End: 2020-04-09

## 2019-09-30 RX ORDER — DILTIAZEM HYDROCHLORIDE 120 MG/1
120 CAPSULE, EXTENDED RELEASE ORAL DAILY
Qty: 30 CAPSULE | Refills: 6 | Status: SHIPPED | OUTPATIENT
Start: 2019-09-30 | End: 2019-09-30

## 2019-09-30 NOTE — PROGRESS NOTES
Subjective:    Patient ID:  Caity Ashby is a 79 y.o. female     HPI here for follow-up of hypertension, hypothyroidism, angioedema following Ace inhibitors, adverse effect of calcium channel blocker.    I feel well however each afternoon my blood pressure goes high.  Now off Altace and on losartan 100 mg.    Current Outpatient Medications   Medication Sig    diltiaZEM (DILACOR XR) 120 MG CDCR Take 1 capsule (120 mg total) by mouth once daily. (Patient not taking: Reported on 9/30/2019)    EPINEPHrine (EPIPEN) 0.3 mg/0.3 mL AtIn Inject 0.3 mLs (0.3 mg total) into the muscle once. for 1 dose    escitalopram oxalate (LEXAPRO) 10 MG tablet Take 10 mg by mouth.    estrogens, conjugated, (PREMARIN) 0.625 MG tablet Take 0.625 mg by mouth once daily.    finasteride (PROPECIA) 1 mg tablet Take 1 mg by mouth.     hydroCHLOROthiazide (HYDRODIURIL) 12.5 MG Tab Take 1 tablet (12.5 mg total) by mouth once daily.    levothyroxine (SYNTHROID) 125 MCG tablet TAKE 1 TABLET BY MOUTH EVERY MORNING    losartan (COZAAR) 100 MG tablet Take 1 tablet (100 mg total) by mouth once daily.    sumatriptan (IMITREX) 100 MG tablet     valACYclovir (VALTREX) 500 MG tablet TAKE 1 TABLET BY MOUTH ONCE DAILY     No current facility-administered medications for this visit.            Review of Systems   Constitution: Negative for chills, decreased appetite, fever, weight gain and weight loss.   HENT: Negative for congestion, hearing loss and sore throat.    Eyes: Negative for blurred vision, double vision and visual disturbance.   Cardiovascular: Negative for chest pain, claudication, dyspnea on exertion, leg swelling, palpitations and syncope.   Respiratory: Negative for cough, hemoptysis, shortness of breath, sputum production and wheezing.    Endocrine: Negative for cold intolerance and heat intolerance.   Hematologic/Lymphatic: Negative for bleeding problem. Does not bruise/bleed easily.   Skin: Negative for color change, dry  "skin, flushing and itching.   Musculoskeletal: Negative for back pain, joint pain and myalgias.   Gastrointestinal: Negative for abdominal pain, anorexia, constipation, diarrhea, dysphagia, nausea and vomiting.        No bleeding per rectum   Genitourinary: Negative for dysuria, flank pain, frequency, hematuria and nocturia.   Neurological: Negative for dizziness, headaches, light-headedness, loss of balance, seizures and tremors.   Psychiatric/Behavioral: Negative for altered mental status and depression.         Vitals:    09/30/19 1424 09/30/19 1429   BP: (!) 140/70 (!) 140/70   Pulse: 68 68   Weight: 68.5 kg (151 lb) 64 kg (141 lb)   Height: 5' 6" (1.676 m) 5' 6" (1.676 m)     Objective:    Physical Exam   Constitutional: She is oriented to person, place, and time. She appears well-developed and well-nourished.   HENT:   Head: Normocephalic and atraumatic.   Nose: Nose normal.   Mouth/Throat: Oropharynx is clear and moist.   Eyes: Pupils are equal, round, and reactive to light. Conjunctivae and EOM are normal.   Neck: Neck supple. No tracheal deviation present. No thyromegaly present.   Cardiovascular: Normal rate, regular rhythm and intact distal pulses. Exam reveals no gallop and no friction rub.   No murmur heard.  Pulmonary/Chest: No respiratory distress. She has no wheezes. She has no rales. She exhibits no tenderness.   Abdominal: Soft. Bowel sounds are normal. She exhibits no distension and no mass. There is no tenderness. There is no rebound and no guarding.   Musculoskeletal: Normal range of motion.   Lymphadenopathy:     She has no cervical adenopathy.   Neurological: She is alert and oriented to person, place, and time.   Skin: Skin is warm and dry.   Psychiatric: Her behavior is normal.         Assessment:       1. Essential hypertension    2. Acquired hypothyroidism    3. 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 Adverse effect of calcium-channel blocker, subsequent " encounter         Plan:       Although she does not recall why she had stop the diltiazem, or review of my notes suggests that she had an adverse reaction to the diltiazem in the past.  Plan HCTZ 12.5 mg daily.  Continue to monitor the blood pressure at home.

## 2019-11-13 DIAGNOSIS — E03.9 HYPOTHYROIDISM: ICD-10-CM

## 2019-11-14 RX ORDER — LEVOTHYROXINE SODIUM 125 UG/1
TABLET ORAL
Qty: 90 TABLET | Refills: 0 | Status: SHIPPED | OUTPATIENT
Start: 2019-11-14 | End: 2020-02-12

## 2019-12-30 ENCOUNTER — OFFICE VISIT (OUTPATIENT)
Dept: CARDIOLOGY | Facility: CLINIC | Age: 79
End: 2019-12-30
Attending: INTERNAL MEDICINE
Payer: COMMERCIAL

## 2019-12-30 VITALS
DIASTOLIC BLOOD PRESSURE: 68 MMHG | WEIGHT: 138 LBS | SYSTOLIC BLOOD PRESSURE: 134 MMHG | HEART RATE: 76 BPM | HEIGHT: 66 IN | BODY MASS INDEX: 22.18 KG/M2

## 2019-12-30 DIAGNOSIS — M17.12 PRIMARY OSTEOARTHRITIS OF LEFT KNEE: ICD-10-CM

## 2019-12-30 DIAGNOSIS — I10 ESSENTIAL HYPERTENSION: Primary | ICD-10-CM

## 2019-12-30 DIAGNOSIS — T46.1X5D: ICD-10-CM

## 2019-12-30 DIAGNOSIS — T46.4X5A ANGIOEDEMA DUE TO ANGIOTENSIN CONVERTING ENZYME INHIBITOR (ACE-I): ICD-10-CM

## 2019-12-30 DIAGNOSIS — T78.3XXA ANGIOEDEMA DUE TO ANGIOTENSIN CONVERTING ENZYME INHIBITOR (ACE-I): ICD-10-CM

## 2019-12-30 DIAGNOSIS — E03.9 ACQUIRED HYPOTHYROIDISM: ICD-10-CM

## 2019-12-30 PROCEDURE — 1101F PR PT FALLS ASSESS DOC 0-1 FALLS W/OUT INJ PAST YR: ICD-10-PCS | Mod: CPTII,S$GLB,, | Performed by: INTERNAL MEDICINE

## 2019-12-30 PROCEDURE — 3075F PR MOST RECENT SYSTOLIC BLOOD PRESS GE 130-139MM HG: ICD-10-PCS | Mod: CPTII,S$GLB,, | Performed by: INTERNAL MEDICINE

## 2019-12-30 PROCEDURE — 3078F DIAST BP <80 MM HG: CPT | Mod: CPTII,S$GLB,, | Performed by: INTERNAL MEDICINE

## 2019-12-30 PROCEDURE — 1159F MED LIST DOCD IN RCRD: CPT | Mod: S$GLB,,, | Performed by: INTERNAL MEDICINE

## 2019-12-30 PROCEDURE — 1159F PR MEDICATION LIST DOCUMENTED IN MEDICAL RECORD: ICD-10-PCS | Mod: S$GLB,,, | Performed by: INTERNAL MEDICINE

## 2019-12-30 PROCEDURE — 1101F PT FALLS ASSESS-DOCD LE1/YR: CPT | Mod: CPTII,S$GLB,, | Performed by: INTERNAL MEDICINE

## 2019-12-30 PROCEDURE — 3075F SYST BP GE 130 - 139MM HG: CPT | Mod: CPTII,S$GLB,, | Performed by: INTERNAL MEDICINE

## 2019-12-30 PROCEDURE — 99213 PR OFFICE/OUTPT VISIT, EST, LEVL III, 20-29 MIN: ICD-10-PCS | Mod: S$GLB,,, | Performed by: INTERNAL MEDICINE

## 2019-12-30 PROCEDURE — 99213 OFFICE O/P EST LOW 20 MIN: CPT | Mod: S$GLB,,, | Performed by: INTERNAL MEDICINE

## 2019-12-30 PROCEDURE — 3078F PR MOST RECENT DIASTOLIC BLOOD PRESSURE < 80 MM HG: ICD-10-PCS | Mod: CPTII,S$GLB,, | Performed by: INTERNAL MEDICINE

## 2019-12-30 NOTE — PROGRESS NOTES
Subjective:    Patient ID:  Caity Ashby is a 79 y.o. female     HPI  I here for follow-up of hypertension, hypothyroidism, angioedema following ACE inhibitors, adverse effect of calcium channel blocker.     I am having a lot of pain in my left knee.  I am considering having a total knee replacement.    Current Outpatient Medications   Medication Sig    EPINEPHrine (EPIPEN) 0.3 mg/0.3 mL AtIn Inject 0.3 mLs (0.3 mg total) into the muscle once. for 1 dose    escitalopram oxalate (LEXAPRO) 10 MG tablet Take 10 mg by mouth.    estrogens, conjugated, (PREMARIN) 0.625 MG tablet Take 0.625 mg by mouth once daily.    finasteride (PROPECIA) 1 mg tablet Take 1 mg by mouth.     hydroCHLOROthiazide (HYDRODIURIL) 12.5 MG Tab Take 1 tablet (12.5 mg total) by mouth once daily.    levothyroxine (SYNTHROID) 125 MCG tablet TAKE 1 TABLET BY MOUTH EVERY MORNING    losartan (COZAAR) 100 MG tablet Take 1 tablet (100 mg total) by mouth once daily.    sumatriptan (IMITREX) 100 MG tablet     valACYclovir (VALTREX) 500 MG tablet TAKE 1 TABLET BY MOUTH ONCE DAILY     No current facility-administered medications for this visit.        Review of Systems   Constitution: Negative for chills, decreased appetite, fever, weight gain and weight loss.   HENT: Negative for congestion, hearing loss and sore throat.    Eyes: Negative for blurred vision, double vision and visual disturbance.   Cardiovascular: Negative for chest pain, claudication, dyspnea on exertion, leg swelling, palpitations and syncope.   Respiratory: Negative for cough, hemoptysis, shortness of breath, sputum production and wheezing.    Endocrine: Negative for cold intolerance and heat intolerance.   Hematologic/Lymphatic: Negative for bleeding problem. Does not bruise/bleed easily.   Skin: Negative for color change, dry skin, flushing and itching.   Musculoskeletal: Positive for joint pain. Negative for back pain and myalgias.   Gastrointestinal: Negative for  "abdominal pain, anorexia, constipation, diarrhea, dysphagia, nausea and vomiting.        No bleeding per rectum   Genitourinary: Negative for dysuria, flank pain, frequency, hematuria and nocturia.   Neurological: Negative for dizziness, headaches, light-headedness, loss of balance, seizures and tremors.   Psychiatric/Behavioral: Negative for altered mental status and depression.     Vitals:    12/30/19 1210   BP: 134/68   Pulse: 76   Weight: 62.6 kg (138 lb)   Height: 5' 6" (1.676 m)        Objective:    Physical Exam   Constitutional: She is oriented to person, place, and time. She appears well-developed and well-nourished.   HENT:   Head: Normocephalic and atraumatic.   Nose: Nose normal.   Mouth/Throat: Oropharynx is clear and moist.   Eyes: Pupils are equal, round, and reactive to light. Conjunctivae and EOM are normal.   Neck: Neck supple. No tracheal deviation present. No thyromegaly present.   Cardiovascular: Normal rate, regular rhythm and intact distal pulses. Exam reveals no gallop and no friction rub.   No murmur heard.  Pulmonary/Chest: No respiratory distress. She has no wheezes. She has no rales. She exhibits no tenderness.   Abdominal: Soft. Bowel sounds are normal. She exhibits no distension and no mass. There is no tenderness. There is no rebound and no guarding.   Musculoskeletal: Normal range of motion.   Lymphadenopathy:     She has no cervical adenopathy.   Neurological: She is alert and oriented to person, place, and time.   Skin: Skin is warm and dry.   Psychiatric: Her behavior is normal.         Assessment:       1. Essential hypertension    2. Acquired hypothyroidism    3. 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 Adverse effect of calcium-channel blocker, subsequent encounter    5. Primary osteoarthritis of left knee         Plan:       Stable.  Continue present pharmacological regimen.            "

## 2020-02-11 DIAGNOSIS — E03.9 HYPOTHYROIDISM: ICD-10-CM

## 2020-02-12 RX ORDER — LEVOTHYROXINE SODIUM 125 UG/1
TABLET ORAL
Qty: 90 TABLET | Refills: 0 | Status: SHIPPED | OUTPATIENT
Start: 2020-02-12 | End: 2020-05-18

## 2020-03-17 RX ORDER — VALACYCLOVIR HYDROCHLORIDE 500 MG/1
TABLET, FILM COATED ORAL
Qty: 100 TABLET | Refills: 0 | Status: SHIPPED | OUTPATIENT
Start: 2020-03-17 | End: 2020-06-16

## 2020-04-09 RX ORDER — HYDROCHLOROTHIAZIDE 12.5 MG/1
TABLET ORAL
Qty: 30 TABLET | Refills: 6 | Status: SHIPPED | OUTPATIENT
Start: 2020-04-09

## 2020-04-27 ENCOUNTER — OFFICE VISIT (OUTPATIENT)
Dept: CARDIOLOGY | Facility: CLINIC | Age: 80
End: 2020-04-27
Attending: INTERNAL MEDICINE
Payer: COMMERCIAL

## 2020-04-27 DIAGNOSIS — M17.12 PRIMARY OSTEOARTHRITIS OF LEFT KNEE: ICD-10-CM

## 2020-04-27 DIAGNOSIS — T46.1X5D: ICD-10-CM

## 2020-04-27 DIAGNOSIS — E03.9 ACQUIRED HYPOTHYROIDISM: ICD-10-CM

## 2020-04-27 DIAGNOSIS — T78.3XXA ANGIOEDEMA DUE TO ANGIOTENSIN CONVERTING ENZYME INHIBITOR (ACE-I): ICD-10-CM

## 2020-04-27 DIAGNOSIS — I10 ESSENTIAL HYPERTENSION: Primary | ICD-10-CM

## 2020-04-27 DIAGNOSIS — T46.4X5A ANGIOEDEMA DUE TO ANGIOTENSIN CONVERTING ENZYME INHIBITOR (ACE-I): ICD-10-CM

## 2020-04-27 PROCEDURE — 99213 OFFICE O/P EST LOW 20 MIN: CPT | Mod: 95,,, | Performed by: INTERNAL MEDICINE

## 2020-04-27 PROCEDURE — 99213 PR OFFICE/OUTPT VISIT, EST, LEVL III, 20-29 MIN: ICD-10-PCS | Mod: 95,,, | Performed by: INTERNAL MEDICINE

## 2020-04-27 NOTE — PROGRESS NOTES
Established Patient - Audio Only Telehealth Visit     The patient location is: Home  The chief complaint leading to consultation is: Pain in both knees.  Visit type: Virtual visit with audio only (telephone)     The reason for the audio only service rather than synchronous audio and video virtual visit was related to technical difficulties or patient preference/necessity.     Each patient to whom I provide medical services by telemedicine is:  (1) informed of the relationship between the physician and patient and the respective role of any other health care provider with respect to management of the patient; and (2) notified that they may decline to receive medical services by telemedicine and may withdraw from such care at any time. Patient verbally consented to receive this service via voice-only telephone call.       HPI: I here for follow-up of hypertension, hypothyroidism, angioedema following ACE inhibitors, adverse effect of calcium channel blocker.     The pains in my knees wake me up at night. I wind up taking a couple of Aleve tablets daily. I'm still not ready to consider surgery.     Assessment and plan:   Continue monitoring BP  Continue present medical regimen.  Try to not use Aleve.    Discussed on phone for 20 minutes.                      This service was not originating from a related E/M service provided within the previous 7 days nor will  to an E/M service or procedure within the next 24 hours or my soonest available appointment.  Prevailing standard of care was able to be met in this audio-only visit.

## 2020-04-28 ENCOUNTER — PATIENT MESSAGE (OUTPATIENT)
Dept: CARDIOLOGY | Facility: CLINIC | Age: 80
End: 2020-04-28

## 2020-05-16 DIAGNOSIS — I10 ESSENTIAL HYPERTENSION: ICD-10-CM

## 2020-05-16 DIAGNOSIS — E03.9 HYPOTHYROIDISM: ICD-10-CM

## 2020-05-18 RX ORDER — LEVOTHYROXINE SODIUM 125 UG/1
TABLET ORAL
Qty: 90 TABLET | Refills: 0 | Status: SHIPPED | OUTPATIENT
Start: 2020-05-18

## 2020-05-18 RX ORDER — LOSARTAN POTASSIUM 100 MG/1
TABLET ORAL
Qty: 90 TABLET | Refills: 3 | Status: SHIPPED | OUTPATIENT
Start: 2020-05-18

## 2021-04-26 ENCOUNTER — PATIENT MESSAGE (OUTPATIENT)
Dept: RESEARCH | Facility: HOSPITAL | Age: 81
End: 2021-04-26

## 2023-01-21 ENCOUNTER — HOSPITAL ENCOUNTER (EMERGENCY)
Facility: HOSPITAL | Age: 83
Discharge: HOME | End: 2023-01-21
Attending: EMERGENCY MEDICINE
Payer: COMMERCIAL

## 2023-01-21 VITALS
HEIGHT: 66 IN | DIASTOLIC BLOOD PRESSURE: 63 MMHG | HEART RATE: 64 BPM | BODY MASS INDEX: 22.18 KG/M2 | SYSTOLIC BLOOD PRESSURE: 138 MMHG | RESPIRATION RATE: 16 BRPM | OXYGEN SATURATION: 100 % | TEMPERATURE: 97 F | WEIGHT: 138 LBS

## 2023-01-21 DIAGNOSIS — R53.1 GENERALIZED WEAKNESS: Primary | ICD-10-CM

## 2023-01-21 LAB
ALBUMIN SERPL BCP-MCNC: 3.3 G/DL (ref 3.5–5.2)
ALP SERPL-CCNC: 48 U/L (ref 55–135)
ALT SERPL W/O P-5'-P-CCNC: 14 U/L (ref 10–44)
ANION GAP SERPL CALC-SCNC: 12 MMOL/L (ref 8–16)
AST SERPL-CCNC: 22 U/L (ref 10–40)
BACTERIA #/AREA URNS AUTO: ABNORMAL /HPF
BASOPHILS # BLD AUTO: 0.06 K/UL (ref 0–0.2)
BASOPHILS NFR BLD: 0.7 % (ref 0–1.9)
BILIRUB SERPL-MCNC: 0.3 MG/DL (ref 0.1–1)
BILIRUB UR QL STRIP: NEGATIVE
BUN SERPL-MCNC: 24 MG/DL (ref 8–23)
CALCIUM SERPL-MCNC: 9.2 MG/DL (ref 8.7–10.5)
CHLORIDE SERPL-SCNC: 99 MMOL/L (ref 95–110)
CK SERPL-CCNC: 132 U/L (ref 20–180)
CLARITY UR REFRACT.AUTO: CLEAR
CO2 SERPL-SCNC: 25 MMOL/L (ref 23–29)
COLOR UR AUTO: YELLOW
CREAT SERPL-MCNC: 1.2 MG/DL (ref 0.5–1.4)
DIFFERENTIAL METHOD: ABNORMAL
EOSINOPHIL # BLD AUTO: 0.4 K/UL (ref 0–0.5)
EOSINOPHIL NFR BLD: 4.6 % (ref 0–8)
ERYTHROCYTE [DISTWIDTH] IN BLOOD BY AUTOMATED COUNT: 12.5 % (ref 11.5–14.5)
EST. GFR  (NO RACE VARIABLE): 45.5 ML/MIN/1.73 M^2
GLUCOSE SERPL-MCNC: 111 MG/DL (ref 70–110)
GLUCOSE UR QL STRIP: NEGATIVE
HCT VFR BLD AUTO: 37.5 % (ref 37–48.5)
HCV AB SERPL QL IA: NORMAL
HGB BLD-MCNC: 12.4 G/DL (ref 12–16)
HGB UR QL STRIP: NEGATIVE
HIV 1+2 AB+HIV1 P24 AG SERPL QL IA: NORMAL
HYALINE CASTS UR QL AUTO: 7 /LPF
IMM GRANULOCYTES # BLD AUTO: 0.02 K/UL (ref 0–0.04)
IMM GRANULOCYTES NFR BLD AUTO: 0.2 % (ref 0–0.5)
KETONES UR QL STRIP: NEGATIVE
LEUKOCYTE ESTERASE UR QL STRIP: ABNORMAL
LYMPHOCYTES # BLD AUTO: 2.4 K/UL (ref 1–4.8)
LYMPHOCYTES NFR BLD: 29.2 % (ref 18–48)
MCH RBC QN AUTO: 34.1 PG (ref 27–31)
MCHC RBC AUTO-ENTMCNC: 33.1 G/DL (ref 32–36)
MCV RBC AUTO: 103 FL (ref 82–98)
MICROSCOPIC COMMENT: ABNORMAL
MONOCYTES # BLD AUTO: 0.7 K/UL (ref 0.3–1)
MONOCYTES NFR BLD: 7.9 % (ref 4–15)
NEUTROPHILS # BLD AUTO: 4.8 K/UL (ref 1.8–7.7)
NEUTROPHILS NFR BLD: 57.4 % (ref 38–73)
NITRITE UR QL STRIP: NEGATIVE
NRBC BLD-RTO: 0 /100 WBC
PH UR STRIP: 6 [PH] (ref 5–8)
PLATELET # BLD AUTO: 237 K/UL (ref 150–450)
PMV BLD AUTO: 10.2 FL (ref 9.2–12.9)
POTASSIUM SERPL-SCNC: 3.4 MMOL/L (ref 3.5–5.1)
PROT SERPL-MCNC: 6.4 G/DL (ref 6–8.4)
PROT UR QL STRIP: NEGATIVE
RBC # BLD AUTO: 3.64 M/UL (ref 4–5.4)
RBC #/AREA URNS AUTO: 1 /HPF (ref 0–4)
SODIUM SERPL-SCNC: 136 MMOL/L (ref 136–145)
SP GR UR STRIP: 1.02 (ref 1–1.03)
SQUAMOUS #/AREA URNS AUTO: 6 /HPF
TSH SERPL DL<=0.005 MIU/L-ACNC: 2.78 UIU/ML (ref 0.4–4)
URN SPEC COLLECT METH UR: ABNORMAL
WBC # BLD AUTO: 8.33 K/UL (ref 3.9–12.7)
WBC #/AREA URNS AUTO: 1 /HPF (ref 0–5)

## 2023-01-21 PROCEDURE — 96360 HYDRATION IV INFUSION INIT: CPT

## 2023-01-21 PROCEDURE — 85025 COMPLETE CBC W/AUTO DIFF WBC: CPT | Performed by: STUDENT IN AN ORGANIZED HEALTH CARE EDUCATION/TRAINING PROGRAM

## 2023-01-21 PROCEDURE — 99284 EMERGENCY DEPT VISIT MOD MDM: CPT | Mod: 25

## 2023-01-21 PROCEDURE — 80053 COMPREHEN METABOLIC PANEL: CPT | Performed by: STUDENT IN AN ORGANIZED HEALTH CARE EDUCATION/TRAINING PROGRAM

## 2023-01-21 PROCEDURE — 81001 URINALYSIS AUTO W/SCOPE: CPT | Performed by: STUDENT IN AN ORGANIZED HEALTH CARE EDUCATION/TRAINING PROGRAM

## 2023-01-21 PROCEDURE — 82550 ASSAY OF CK (CPK): CPT | Performed by: STUDENT IN AN ORGANIZED HEALTH CARE EDUCATION/TRAINING PROGRAM

## 2023-01-21 PROCEDURE — 84443 ASSAY THYROID STIM HORMONE: CPT | Performed by: STUDENT IN AN ORGANIZED HEALTH CARE EDUCATION/TRAINING PROGRAM

## 2023-01-21 PROCEDURE — 99284 PR EMERGENCY DEPT VISIT,LEVEL IV: ICD-10-PCS | Mod: ,,, | Performed by: EMERGENCY MEDICINE

## 2023-01-21 PROCEDURE — 93005 ELECTROCARDIOGRAM TRACING: CPT

## 2023-01-21 PROCEDURE — 25000003 PHARM REV CODE 250: Performed by: STUDENT IN AN ORGANIZED HEALTH CARE EDUCATION/TRAINING PROGRAM

## 2023-01-21 PROCEDURE — 82962 GLUCOSE BLOOD TEST: CPT

## 2023-01-21 PROCEDURE — 99284 EMERGENCY DEPT VISIT MOD MDM: CPT | Mod: ,,, | Performed by: EMERGENCY MEDICINE

## 2023-01-21 PROCEDURE — 93010 EKG 12-LEAD: ICD-10-PCS | Mod: ,,, | Performed by: INTERNAL MEDICINE

## 2023-01-21 PROCEDURE — 86803 HEPATITIS C AB TEST: CPT | Performed by: PHYSICIAN ASSISTANT

## 2023-01-21 PROCEDURE — 87389 HIV-1 AG W/HIV-1&-2 AB AG IA: CPT | Performed by: PHYSICIAN ASSISTANT

## 2023-01-21 PROCEDURE — 93010 ELECTROCARDIOGRAM REPORT: CPT | Mod: ,,, | Performed by: INTERNAL MEDICINE

## 2023-01-21 RX ADMIN — POTASSIUM BICARBONATE 25 MEQ: 978 TABLET, EFFERVESCENT ORAL at 11:01

## 2023-01-21 NOTE — ED TRIAGE NOTES
Pt took a vicodin at midnight on ammn empty stomach. Was in clinic w/ her  and began  to feel weak  and like she would pass out. Denies pain but had  nausea . C/O  rt groin pain x 1 month. Denies diarrhea or constipation  or trauma.

## 2023-01-21 NOTE — ED NOTES
Pt states she feel much better than when she first arrived. Transported to restroom per w/c- tolerated well.

## 2023-01-21 NOTE — EKG INTERPRETATIONS - EMERGENCY DEPT.
Independently interpreted by MD:  Rate 55, NSR, no stemi, no ectopy, no hypertrophy, sinus bradycardia, abnormal P wave axis

## 2023-01-21 NOTE — ED PROVIDER NOTES
Encounter Date: 1/21/2023       History     Chief Complaint   Patient presents with    Weakness     Pt felt weak, dizzy. BP was check and systolic 70s. States no changes recently besides taking one pain pill last night. Still feels lightheaded and weak. Also c/o groin pain x 1 month     Patient is an 83-year-old female with a past medical history of hypertension, HLD and hypothyroidism presenting to the ED for generalized weakness and lightheadedness.  She woke this morning with these symptoms, and her and her  subsequently checked her blood pressure to find that it was in the 70s systolic.  She did not syncopize or have any falls.  The only medication change that she reports is that she took an old Vicodin prescription yesterday at midnight (9 hours PTA) for right-sided groin pain that has been present for a month.  She denies any recent illnesses, fevers/chills, chest pain, shortness of breath, hip pain, difficulty with ambulation or urinary complaints.    In regards to her groin pain, she states that has been present for a month, which coincides when she started her Lipitor.  There has been no trauma    Review of patient's allergies indicates:   Allergen Reactions    Boniva [ibandronate] Other (See Comments)     SHOULDER PAIN    Demerol [meperidine]     Ramipril Swelling     History reviewed. No pertinent past medical history.  Past Surgical History:   Procedure Laterality Date    COSMETIC SURGERY      HYSTERECTOMY       History reviewed. No pertinent family history.  Social History     Tobacco Use    Smoking status: Never    Smokeless tobacco: Never   Substance Use Topics    Alcohol use: Not Currently    Drug use: Never     Review of Systems   Constitutional:  Negative for activity change, chills and fever.   HENT:  Negative for congestion, ear pain and sore throat.    Respiratory:  Negative for shortness of breath and stridor.    Cardiovascular:  Negative for chest pain and palpitations.    Gastrointestinal:  Negative for abdominal pain, nausea and vomiting.   Genitourinary:  Negative for dysuria and urgency.   Musculoskeletal:  Negative for back pain.   Skin:  Negative for rash.   Neurological:  Positive for weakness (generalized) and light-headedness. Negative for dizziness, syncope and headaches.   Hematological:  Does not bruise/bleed easily.     Physical Exam     Initial Vitals [01/21/23 0853]   BP Pulse Resp Temp SpO2   (!) 94/50 60 18 97.4 °F (36.3 °C) 96 %      MAP       --         Physical Exam    Nursing note and vitals reviewed.  Constitutional: She appears well-developed and well-nourished. She is not diaphoretic. No distress.   Well-appearing.  Speaking full sentences.  No acute distress.   HENT:   Head: Normocephalic and atraumatic.   Right Ear: External ear normal.   Left Ear: External ear normal.   Neck: Neck supple.   Cardiovascular:  Regular rhythm, normal heart sounds and intact distal pulses.   Bradycardia present.         Pulmonary/Chest: Breath sounds normal. No respiratory distress. She has no wheezes. She has no rhonchi. She has no rales.   Abdominal: Abdomen is soft. She exhibits no distension. There is no abdominal tenderness.   No appreciable masses or hernias present to right groin. No abdominal TTP. There is no rebound and no guarding.   Musculoskeletal:      Cervical back: Neck supple.      Comments: No TTP of bilateral hips. Tolerates pelvic rocking without pain elicited.     Neurological: She is alert and oriented to person, place, and time. GCS score is 15. GCS eye subscore is 4. GCS verbal subscore is 5. GCS motor subscore is 6.   Skin: Skin is warm. Capillary refill takes less than 2 seconds. No rash noted.   Psychiatric: She has a normal mood and affect.       ED Course   Procedures  Labs Reviewed   CBC W/ AUTO DIFFERENTIAL - Abnormal; Notable for the following components:       Result Value    RBC 3.64 (*)      (*)     MCH 34.1 (*)     All other components  within normal limits   COMPREHENSIVE METABOLIC PANEL - Abnormal; Notable for the following components:    Potassium 3.4 (*)     Glucose 111 (*)     BUN 24 (*)     Albumin 3.3 (*)     Alkaline Phosphatase 48 (*)     eGFR 45.5 (*)     All other components within normal limits   URINALYSIS, REFLEX TO URINE CULTURE - Abnormal; Notable for the following components:    Leukocytes, UA 1+ (*)     All other components within normal limits    Narrative:     Specimen Source->Urine   URINALYSIS MICROSCOPIC - Abnormal; Notable for the following components:    Hyaline Casts, UA 7 (*)     All other components within normal limits    Narrative:     Specimen Source->Urine   HIV 1 / 2 ANTIBODY    Narrative:     Release to patient->Immediate   HEPATITIS C ANTIBODY    Narrative:     Release to patient->Immediate   CK   TSH     EKG Readings: (Independently Interpreted)   Initial Reading: No STEMI. Previous EKG: Compared with most recent EKG Rhythm: Sinus Bradycardia. Heart Rate: 52. Ectopy: No Ectopy. Conduction: Normal.     Imaging Results    None          Medications   lactated ringers bolus 1,000 mL (0 mLs Intravenous Stopped 1/21/23 1125)   potassium bicarbonate disintegrating tablet 25 mEq (25 mEq Oral Given 1/21/23 1105)     Medical Decision Making:   History:   Old Medical Records: I decided to obtain old medical records.  Initial Assessment:   Emergent evaluation of generalized weakness/lightheadedness in the setting of a measured low blood pressure.  She is afebrile and hemodynamically stable.  Differential Diagnosis:   Orthostatic hypotension, vasovagal response, less likely opioid-mediated histamine release, statin induced myositis, electrolyte abnormality, ADEEL  Clinical Tests:   Lab Tests: Ordered and Reviewed  Medical Tests: Ordered and Reviewed  ED Management:  Patient well-appearing on exam. Blood pressure is improved to the 120s systolic.  Labs are unremarkable except for mildly elevated BUN and mild hypokalemia;  repleted. Normal CK. Given 1 L IVF. Discussed strict ED return precautions, and she and her  expressed understanding.                        Clinical Impression:   Final diagnoses:  [R53.1] Generalized weakness (Primary)        ED Disposition Condition    Discharge Stable          ED Prescriptions    None       Follow-up Information       Follow up With Specialties Details Why Contact Info    [facility] - Emergency Dept Emergency Medicine Go to  As needed [id number]  [facility] [id number]  [phone]             Juan Rucker MD  Resident  01/21/23 1133

## 2023-01-21 NOTE — DISCHARGE INSTRUCTIONS
Please return to the emergency department if you began to experience symptoms including fevers/chills, chest pain or shortness of breath.  Additionally, if you begin to feel similar symptoms like today, please return.

## 2023-01-23 LAB — POCT GLUCOSE: 129 MG/DL (ref 70–110)

## 2025-06-21 ENCOUNTER — HOSPITAL ENCOUNTER (INPATIENT)
Facility: HOSPITAL | Age: 85
LOS: 9 days | Discharge: NURSING, REHAB OR LONG-TERM CARE | DRG: 493 | End: 2025-07-03
Attending: EMERGENCY MEDICINE | Admitting: HOSPITALIST
Payer: MEDICARE

## 2025-06-21 DIAGNOSIS — R07.9 CHEST PAIN: ICD-10-CM

## 2025-06-21 DIAGNOSIS — S82.832A CLOSED FRACTURE OF DISTAL END OF LEFT FIBULA, UNSPECIFIED FRACTURE MORPHOLOGY, INITIAL ENCOUNTER: Primary | ICD-10-CM

## 2025-06-21 DIAGNOSIS — Z01.818 PRE-OP EVALUATION: ICD-10-CM

## 2025-06-21 DIAGNOSIS — S82.852A CLOSED DISPLACED TRIMALLEOLAR FRACTURE OF LEFT ANKLE, INITIAL ENCOUNTER: ICD-10-CM

## 2025-06-21 DIAGNOSIS — S92.325A CLOSED NONDISPLACED FRACTURE OF SECOND METATARSAL BONE OF LEFT FOOT, INITIAL ENCOUNTER: ICD-10-CM
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
## 2025-06-21 PROBLEM — E87.6 HYPOKALEMIA: Status: ACTIVE | Noted: 2025-06-21

## 2025-06-21 LAB
ABSOLUTE EOSINOPHIL (OHS): 0.31 K/UL
ABSOLUTE MONOCYTE (OHS): 0.72 K/UL (ref 0.3–1)
ABSOLUTE NEUTROPHIL COUNT (OHS): 5.12 K/UL (ref 1.8–7.7)
ALBUMIN SERPL BCP-MCNC: 3.8 G/DL (ref 3.5–5.2)
ALP SERPL-CCNC: 68 UNIT/L (ref 40–150)
ALT SERPL W/O P-5'-P-CCNC: 17 UNIT/L (ref 10–44)
ANION GAP (OHS): 17 MMOL/L (ref 8–16)
AST SERPL-CCNC: 30 UNIT/L (ref 11–45)
BASOPHILS # BLD AUTO: 0.09 K/UL
BASOPHILS NFR BLD AUTO: 0.9 %
BILIRUB SERPL-MCNC: 0.5 MG/DL (ref 0.1–1)
BUN SERPL-MCNC: 28 MG/DL (ref 8–23)
CALCIUM SERPL-MCNC: 10.6 MG/DL (ref 8.7–10.5)
CHLORIDE SERPL-SCNC: 98 MMOL/L (ref 95–110)
CO2 SERPL-SCNC: 20 MMOL/L (ref 23–29)
CREAT SERPL-MCNC: 1.3 MG/DL (ref 0.5–1.4)
ERYTHROCYTE [DISTWIDTH] IN BLOOD BY AUTOMATED COUNT: 11.9 % (ref 11.5–14.5)
GFR SERPLBLD CREATININE-BSD FMLA CKD-EPI: 41 ML/MIN/1.73/M2
GLUCOSE SERPL-MCNC: 106 MG/DL (ref 70–110)
HCT VFR BLD AUTO: 40.6 % (ref 37–48.5)
HGB BLD-MCNC: 14 GM/DL (ref 12–16)
IMM GRANULOCYTES # BLD AUTO: 0.02 K/UL (ref 0–0.04)
IMM GRANULOCYTES NFR BLD AUTO: 0.2 % (ref 0–0.5)
LYMPHOCYTES # BLD AUTO: 3.4 K/UL (ref 1–4.8)
MAGNESIUM SERPL-MCNC: 1.8 MG/DL (ref 1.6–2.6)
MCH RBC QN AUTO: 34.1 PG (ref 27–31)
MCHC RBC AUTO-ENTMCNC: 34.5 G/DL (ref 32–36)
MCV RBC AUTO: 99 FL (ref 82–98)
NUCLEATED RBC (/100WBC) (OHS): 0 /100 WBC
PLATELET # BLD AUTO: 305 K/UL (ref 150–450)
PMV BLD AUTO: 10.3 FL (ref 9.2–12.9)
POTASSIUM SERPL-SCNC: 3.1 MMOL/L (ref 3.5–5.1)
PROT SERPL-MCNC: 7.5 GM/DL (ref 6–8.4)
RBC # BLD AUTO: 4.11 M/UL (ref 4–5.4)
RELATIVE EOSINOPHIL (OHS): 3.2 %
RELATIVE LYMPHOCYTE (OHS): 35.2 % (ref 18–48)
RELATIVE MONOCYTE (OHS): 7.5 % (ref 4–15)
RELATIVE NEUTROPHIL (OHS): 53 % (ref 38–73)
SODIUM SERPL-SCNC: 135 MMOL/L (ref 136–145)
WBC # BLD AUTO: 9.66 K/UL (ref 3.9–12.7)

## 2025-06-21 PROCEDURE — G0378 HOSPITAL OBSERVATION PER HR: HCPCS

## 2025-06-21 PROCEDURE — 93010 ELECTROCARDIOGRAM REPORT: CPT | Mod: ,,, | Performed by: INTERNAL MEDICINE

## 2025-06-21 PROCEDURE — 93005 ELECTROCARDIOGRAM TRACING: CPT

## 2025-06-21 PROCEDURE — 27000221 HC OXYGEN, UP TO 24 HOURS

## 2025-06-21 PROCEDURE — 85025 COMPLETE CBC W/AUTO DIFF WBC: CPT | Performed by: STUDENT IN AN ORGANIZED HEALTH CARE EDUCATION/TRAINING PROGRAM

## 2025-06-21 PROCEDURE — 94761 N-INVAS EAR/PLS OXIMETRY MLT: CPT

## 2025-06-21 PROCEDURE — 63600175 PHARM REV CODE 636 W HCPCS: Performed by: STUDENT IN AN ORGANIZED HEALTH CARE EDUCATION/TRAINING PROGRAM

## 2025-06-21 PROCEDURE — 96374 THER/PROPH/DIAG INJ IV PUSH: CPT

## 2025-06-21 PROCEDURE — 63600175 PHARM REV CODE 636 W HCPCS: Performed by: EMERGENCY MEDICINE

## 2025-06-21 PROCEDURE — 80053 COMPREHEN METABOLIC PANEL: CPT | Performed by: STUDENT IN AN ORGANIZED HEALTH CARE EDUCATION/TRAINING PROGRAM

## 2025-06-21 PROCEDURE — 96375 TX/PRO/DX INJ NEW DRUG ADDON: CPT

## 2025-06-21 PROCEDURE — 96376 TX/PRO/DX INJ SAME DRUG ADON: CPT

## 2025-06-21 PROCEDURE — 99152 MOD SED SAME PHYS/QHP 5/>YRS: CPT

## 2025-06-21 PROCEDURE — 27788 TREATMENT OF ANKLE FRACTURE: CPT | Mod: LT

## 2025-06-21 PROCEDURE — 99285 EMERGENCY DEPT VISIT HI MDM: CPT | Mod: 25

## 2025-06-21 PROCEDURE — 0QSKXZZ REPOSITION LEFT FIBULA, EXTERNAL APPROACH: ICD-10-PCS | Performed by: ORTHOPAEDIC SURGERY

## 2025-06-21 PROCEDURE — 83735 ASSAY OF MAGNESIUM: CPT | Performed by: NURSE PRACTITIONER

## 2025-06-21 RX ORDER — GLUCAGON 1 MG
1 KIT INJECTION
Status: DISCONTINUED | OUTPATIENT
Start: 2025-06-22 | End: 2025-07-03 | Stop reason: HOSPADM

## 2025-06-21 RX ORDER — IBUPROFEN 200 MG
16 TABLET ORAL
Status: DISCONTINUED | OUTPATIENT
Start: 2025-06-22 | End: 2025-07-03 | Stop reason: HOSPADM

## 2025-06-21 RX ORDER — ONDANSETRON HYDROCHLORIDE 2 MG/ML
4 INJECTION, SOLUTION INTRAVENOUS ONCE AS NEEDED
Status: COMPLETED | OUTPATIENT
Start: 2025-06-21 | End: 2025-06-21

## 2025-06-21 RX ORDER — ACETAMINOPHEN 500 MG
1000 TABLET ORAL EVERY 8 HOURS
Status: DISCONTINUED | OUTPATIENT
Start: 2025-06-21 | End: 2025-07-03 | Stop reason: HOSPADM

## 2025-06-21 RX ORDER — PROPOFOL 10 MG/ML
0.5 VIAL (ML) INTRAVENOUS
Status: DISCONTINUED | OUTPATIENT
Start: 2025-06-21 | End: 2025-06-21

## 2025-06-21 RX ORDER — NALOXONE HCL 0.4 MG/ML
0.02 VIAL (ML) INJECTION
Status: DISCONTINUED | OUTPATIENT
Start: 2025-06-22 | End: 2025-07-03 | Stop reason: HOSPADM

## 2025-06-21 RX ORDER — FENTANYL CITRATE 50 UG/ML
50 INJECTION, SOLUTION INTRAMUSCULAR; INTRAVENOUS
Refills: 0 | Status: COMPLETED | OUTPATIENT
Start: 2025-06-21 | End: 2025-06-21

## 2025-06-21 RX ORDER — SODIUM CHLORIDE 0.9 % (FLUSH) 0.9 %
10 SYRINGE (ML) INJECTION EVERY 12 HOURS PRN
Status: DISCONTINUED | OUTPATIENT
Start: 2025-06-22 | End: 2025-07-03 | Stop reason: HOSPADM

## 2025-06-21 RX ORDER — MORPHINE SULFATE 2 MG/ML
2 INJECTION, SOLUTION INTRAMUSCULAR; INTRAVENOUS EVERY 4 HOURS PRN
Status: DISCONTINUED | OUTPATIENT
Start: 2025-06-22 | End: 2025-07-02

## 2025-06-21 RX ORDER — ONDANSETRON HYDROCHLORIDE 2 MG/ML
4 INJECTION, SOLUTION INTRAVENOUS EVERY 8 HOURS PRN
Status: DISCONTINUED | OUTPATIENT
Start: 2025-06-22 | End: 2025-07-03 | Stop reason: HOSPADM

## 2025-06-21 RX ORDER — TALC
6 POWDER (GRAM) TOPICAL NIGHTLY PRN
Status: DISCONTINUED | OUTPATIENT
Start: 2025-06-22 | End: 2025-07-03 | Stop reason: HOSPADM

## 2025-06-21 RX ORDER — PROPOFOL 10 MG/ML
30 VIAL (ML) INTRAVENOUS
Status: COMPLETED | OUTPATIENT
Start: 2025-06-21 | End: 2025-06-21

## 2025-06-21 RX ORDER — PROPOFOL 10 MG/ML
45 VIAL (ML) INTRAVENOUS
Status: COMPLETED | OUTPATIENT
Start: 2025-06-21 | End: 2025-06-21

## 2025-06-21 RX ORDER — PROPOFOL 10 MG/ML
20 VIAL (ML) INTRAVENOUS
Status: COMPLETED | OUTPATIENT
Start: 2025-06-21 | End: 2025-06-21

## 2025-06-21 RX ORDER — IBUPROFEN 200 MG
24 TABLET ORAL
Status: DISCONTINUED | OUTPATIENT
Start: 2025-06-22 | End: 2025-07-03 | Stop reason: HOSPADM

## 2025-06-21 RX ORDER — TRAMADOL HYDROCHLORIDE 50 MG/1
50 TABLET, FILM COATED ORAL EVERY 6 HOURS PRN
Status: DISCONTINUED | OUTPATIENT
Start: 2025-06-21 | End: 2025-06-22

## 2025-06-21 RX ORDER — LEVOTHYROXINE SODIUM 125 UG/1
125 TABLET ORAL
Status: DISCONTINUED | OUTPATIENT
Start: 2025-06-22 | End: 2025-07-03 | Stop reason: HOSPADM

## 2025-06-21 RX ORDER — METHOCARBAMOL 500 MG/1
500 TABLET, FILM COATED ORAL 3 TIMES DAILY
Status: DISCONTINUED | OUTPATIENT
Start: 2025-06-21 | End: 2025-07-03 | Stop reason: HOSPADM

## 2025-06-21 RX ORDER — CEFAZOLIN 2 G/1
2 INJECTION, POWDER, FOR SOLUTION INTRAMUSCULAR; INTRAVENOUS
Status: COMPLETED | OUTPATIENT
Start: 2025-06-21 | End: 2025-06-21

## 2025-06-21 RX ORDER — ESCITALOPRAM OXALATE 10 MG/1
10 TABLET ORAL DAILY
Status: DISCONTINUED | OUTPATIENT
Start: 2025-06-22 | End: 2025-07-03 | Stop reason: HOSPADM

## 2025-06-21 RX ADMIN — FENTANYL CITRATE 50 MCG: 50 INJECTION INTRAMUSCULAR; INTRAVENOUS at 05:06

## 2025-06-21 RX ADMIN — PROPOFOL 30 MG: 10 INJECTION, EMULSION INTRAVENOUS at 09:06

## 2025-06-21 RX ADMIN — ONDANSETRON 4 MG: 2 INJECTION INTRAMUSCULAR; INTRAVENOUS at 06:06

## 2025-06-21 RX ADMIN — PROPOFOL 20 MG: 10 INJECTION, EMULSION INTRAVENOUS at 09:06

## 2025-06-21 RX ADMIN — PROPOFOL 45 MG: 10 INJECTION, EMULSION INTRAVENOUS at 09:06

## 2025-06-21 RX ADMIN — CEFAZOLIN 2 G: 2 INJECTION, POWDER, FOR SOLUTION INTRAMUSCULAR; INTRAVENOUS at 08:06

## 2025-06-21 NOTE — ED NOTES
Patient identifiers for Caity Ashby 85 y.o. female checked and correct.  Chief Complaint   Patient presents with    Fall     While walking down steps. Left ankle pain, deformity noted.      Past Medical History:   Diagnosis Date    Hypertension     Thyroid disease      Allergies reported:   Review of patient's allergies indicates:   Allergen Reactions    Boniva [ibandronate] Other (See Comments)     SHOULDER PAIN    Demerol [meperidine]     Ramipril Swelling         LOC: Patient is awake, alert, and aware of environment with an appropriate affect. Patient is oriented x 4 and speaking appropriately.  APPEARANCE: Patient resting comfortably and in no acute distress. Patient is clean and well groomed, patient's clothing is properly fastened.  HEENT: WDL  SKIN: The skin is warm and dry. Patient has normal skin turgor and moist mucus membranes.   MUSCULOSKELETAL: Patient is moving upper extremities well, obvious deformity noted to left ankle. Pulses intact.   RESPIRATORY: Airway is open and patent. Respirations are spontaneous and non-labored with normal effort and rate.  CARDIAC: Patient has a normal rate and rhythm. 82 on cardiac monitor. No peripheral edema noted. Denies chest pain and SOB at at time of assessment.   ABDOMEN: No distention noted. Soft and non-tender upon palpation.  NEUROLOGICAL: pupils 2mm, PERRL. Facial expression is symmetrical. Hand grasps are equal bilaterally. Normal sensation in all extremities when touched with finger.

## 2025-06-21 NOTE — ED PROVIDER NOTES
Encounter Date: 6/21/2025       History     Chief Complaint   Patient presents with    Fall     While walking down steps. Left ankle pain, deformity noted.      HPI  Caity Ashby is an 85 YOF with PMH HTN and thyroid disease is presenting after a fall by EMS.     Patient states she missed the last step when going down the stairs resulting in a fall. Patient reports immediate pain to her L ankle.    EMS able to put on temporary splint. No meds. Unable to palpate pulse.  Review of patient's allergies indicates:   Allergen Reactions    Boniva [ibandronate] Other (See Comments)     SHOULDER PAIN    Demerol [meperidine]     Ramipril Swelling     Past Medical History:   Diagnosis Date    Hypertension     Thyroid disease      Past Surgical History:   Procedure Laterality Date    COSMETIC SURGERY      HYSTERECTOMY       No family history on file.  Social History[1]  Review of Systems    Physical Exam     Initial Vitals [06/21/25 1645]   BP Pulse Resp Temp SpO2   (!) 165/76 80 18 98.5 °F (36.9 °C) 97 %      MAP       --         Physical Exam    Nursing note and vitals reviewed.  Constitutional: She appears well-developed and well-nourished.   HENT:   Head: Normocephalic and atraumatic.   Cardiovascular:  Intact distal pulses.           Pulses obtained by doppler   Pulmonary/Chest: Breath sounds normal. No respiratory distress.   Musculoskeletal:      Right ankle: Normal.      Left ankle: Deformity present. No ecchymosis. Tenderness present. Decreased range of motion. Normal pulse.     Skin: Skin is warm and dry. Capillary refill takes 2 to 3 seconds.   Psychiatric: She has a normal mood and affect.         ED Course   Procedural Sedation        Date/Time: 6/21/2025 5:49 PM    Performed by: Tish Knox MD  Authorized by: Erika Partida MD  Consent Done: Yes  Consent: Verbal consent obtained  Risks and benefits: risks, benefits and alternatives were discussed  Consent given by: patient  Patient  "understanding: patient states understanding of the procedure being performed  Patient consent: the patient's understanding of the procedure matches consent given  Procedure consent: procedure consent matches procedure scheduled  Relevant documents: relevant documents present and verified  Patient identity confirmed: verbally with patient,  and name  Time out: Immediately prior to procedure a "time out" was called to verify the correct patient, procedure, equipment, support staff and site/side marked as required.  ASA Class: Class 2 - Mild Illness without functional impairment.  Mallampati Score: Class 1 - Visualization of the soft palate, fauces, uvula, and anterior/posterior pillars.   NPO STATUS:  Date/Time of last solid: 2025 1:30 PM    Sedation type: moderate (conscious) sedation    Sedatives: propofol  Sedation start date/time: 2025 9:24 PM  Sedation end date/time: 2025 9:42 PM  Total Sedation Time (min): 18  Vitals: Vital signs were monitored during sedation.  Complications: No complications.   Patient/Family history of anesthesia or sedation complications: No      Labs Reviewed   COMPREHENSIVE METABOLIC PANEL - Abnormal       Result Value    Sodium 135 (*)     Potassium 3.1 (*)     Chloride 98      CO2 20 (*)     Glucose 106      BUN 28 (*)     Creatinine 1.3      Calcium 10.6 (*)     Protein Total 7.5      Albumin 3.8      Bilirubin Total 0.5      ALP 68      AST 30      ALT 17      Anion Gap 17 (*)     eGFR 41 (*)    CBC WITH DIFFERENTIAL - Abnormal    WBC 9.66      RBC 4.11      HGB 14.0      HCT 40.6      MCV 99 (*)     MCH 34.1 (*)     MCHC 34.5      RDW 11.9      Platelet Count 305      MPV 10.3      Nucleated RBC 0      Neut % 53.0      Lymph % 35.2      Mono % 7.5      Eos % 3.2      Basophil % 0.9      Imm Grans % 0.2      Neut # 5.12      Lymph # 3.40      Mono # 0.72      Eos # 0.31      Baso # 0.09      Imm Grans # 0.02     CBC W/ AUTO DIFFERENTIAL    Narrative:     The following " orders were created for panel order CBC auto differential.  Procedure                               Abnormality         Status                     ---------                               -----------         ------                     CBC with Differential[[id number]]        Abnormal            Final result                 Please view results for these tests on the individual orders.          Imaging Results              X-Ray Ankle Complete Left (In process)                      X-Ray Tibia Fibula 2 View Left (Final result)  Result time 06/21/25 18:15:43      Final result by Benito Waldron MD (06/21/25 18:15:43)                   Impression:      1. Distal fibular fracture noting tibiotalar dislocation.  Reimaging following closed reduction is recommended for more detailed evaluation of the distal tibia to definitively exclude fracture.      Electronically signed by: Benito Waldron MD  Date:    06/21/2025  Time:    18:15               Narrative:    EXAMINATION:  XR TIBIA FIBULA 2 VIEW LEFT; XR ANKLE COMPLETE 3 VIEW LEFT    CLINICAL HISTORY:  Unspecified fall, initial encounter    TECHNIQUE:  AP and lateral views of the left tibia and fibula were performed.  Three views left ankle.    COMPARISON:  None.    FINDINGS:  Four views left tibia fibula, three views left ankle.    There is avulsion fracture involving the distal aspect of the fibula noting displacement of the distal fracture fragment.  There is posterior dislocation of the tibiotalar articulation.  There is questionable fracture of the medial malleolus though not confirmed.  Reimaging is recommended following closed reduction to definitively exclude fracture.  There are degenerative changes of the knee.  There is osteopenia.  There is edema about the lower leg and ankle.                                       X-Ray Foot Complete Left (Final result)  Result time 06/21/25 18:15:08      Final result by Benito Waldron MD (06/21/25 18:15:08)                    Impression:      1. There is fracture involving the proximal aspect of the 2nd metatarsal and possibly 3rd.  No significant overlying edema, correlation with any focal tenderness is recommended as this could reflect irregular healing of prior fracture.  Please see separately dictated report for details of the distal tibia and fibula.      Electronically signed by: Benito Waldron MD  Date:    06/21/2025  Time:    18:15               Narrative:    EXAMINATION:  XR FOOT COMPLETE 3 VIEW LEFT    CLINICAL HISTORY:  .  Unspecified fall, initial encounter    TECHNIQUE:  AP, lateral and oblique views of the left foot were performed.    COMPARISON:  None    FINDINGS:  Two views left foot.    No acute displaced fracture or dislocates cracks that there is osteopenia.  There are degenerative changes of the foot.  There is fracture involving the proximal aspect of the 2nd metatarsal and possibly 3rd metatarsal.  No significant edema along the dorsal aspect of the foot.  Please see separately dictated report for details of the ankle.                                       X-Ray Ankle Complete Left (Final result)  Result time 06/21/25 18:15:43      Final result by Benito Waldron MD (06/21/25 18:15:43)                   Impression:      1. Distal fibular fracture noting tibiotalar dislocation.  Reimaging following closed reduction is recommended for more detailed evaluation of the distal tibia to definitively exclude fracture.      Electronically signed by: Benito Waldron MD  Date:    06/21/2025  Time:    18:15               Narrative:    EXAMINATION:  XR TIBIA FIBULA 2 VIEW LEFT; XR ANKLE COMPLETE 3 VIEW LEFT    CLINICAL HISTORY:  Unspecified fall, initial encounter    TECHNIQUE:  AP and lateral views of the left tibia and fibula were performed.  Three views left ankle.    COMPARISON:  None.    FINDINGS:  Four views left tibia fibula, three views left ankle.    There is avulsion fracture involving the distal aspect of  the fibula noting displacement of the distal fracture fragment.  There is posterior dislocation of the tibiotalar articulation.  There is questionable fracture of the medial malleolus though not confirmed.  Reimaging is recommended following closed reduction to definitively exclude fracture.  There are degenerative changes of the knee.  There is osteopenia.  There is edema about the lower leg and ankle.                                       Medications   fentaNYL 50 mcg/mL injection 50 mcg (50 mcg Intravenous Given 6/21/25 1708)   ondansetron injection 4 mg (4 mg Intravenous Given 6/21/25 1813)   fentaNYL 50 mcg/mL injection 50 mcg (50 mcg Intravenous Given 6/21/25 1757)   ceFAZolin 2 g (2 g Intravenous Given 6/21/25 2034)   propofol (DIPRIVAN) 10 mg/mL IVP (45 mg Intravenous Given by Provider 6/21/25 2125)   propofol (DIPRIVAN) 10 mg/mL IVP (30 mg Intravenous Given by Provider 6/21/25 2130)   propofol (DIPRIVAN) 10 mg/mL IVP (30 mg Intravenous Given by Provider 6/21/25 2136)   propofol (DIPRIVAN) 10 mg/mL IVP (20 mg Intravenous Given by Provider 6/21/25 2138)     Medical Decision Making  Caity Ashby is an 85 YOF with PMH HTN and thyroid disease is presenting after a fall by EMS.  On arrival patient is hemodynamically stable.  On exam patient has a very obviously deformed left ankle.  Pulses obtained with Doppler.  Normal sensation.  Differentials at this time include fracture, dislocation, contusion, tissue injury.  Plan to obtain x-rays, CBC, CMP, and fentanyl for pain.  BC grossly unremarkable.  CMP notable for potassium 3.1.  Bicarb 20.  BUN 28.  X-rays show  Distal fibular fracture noting tibiotalar dislocation. There is fracture involving the proximal aspect of the 2nd metatarsal and possibly 3rd.  Procedural sedation performed and patient's fracture was reduced by ortho but they recommend admission to .  Discussed case with hospital medicine who admitted the patient.    Amount and/or Complexity of  Data Reviewed  Labs: ordered. Decision-making details documented in ED Course.  Radiology: ordered. Decision-making details documented in ED Course.    Risk  Prescription drug management.               ED Course as of 06/21/25 2240   Sat Jun 21, 2025 1739 Sodium(!): 135 [AC]   1739 Potassium(!): 3.1 [AC]   1739 CO2(!): 20 [AC]   1739 BUN(!): 28 [AC]   1818 X-Ray Foot Complete Left   There is fracture involving the proximal aspect of the 2nd metatarsal and possibly 3rd.  No significant overlying edema, correlation with any focal tenderness is recommended as this could reflect irregular healing of prior fracture. [AC]   1818 X-Ray Tibia Fibula 2 View Left  Distal fibular fracture noting tibiotalar dislocation. [AC]   2053 Discussed with orthopedics, still occupied with class A in the OR. Updated patient and  that we will go ahead with the reduction but the patient and  have declined ED reduction and want the orthopedists to reduce it. [GK]      ED Course User Index  [AC] Tish Knox MD  [GK] Erika Partida MD                           Clinical Impression:  Final diagnoses:  [W19.XXXA] Fall  [S82.832A] Closed fracture of distal end of left fibula, unspecified fracture morphology, initial encounter (Primary)  [S92.325A] Closed nondisplaced fracture of second metatarsal bone of left foot, initial encounter          ED Disposition Condition    Observation                         [1]   Social History  Tobacco Use    Smoking status: Never    Smokeless tobacco: Never   Substance Use Topics    Alcohol use: Not Currently    Drug use: Never        Tish Knox MD  Resident  06/21/25 2240

## 2025-06-22 PROBLEM — S82.852A CLOSED DISPLACED TRIMALLEOLAR FRACTURE OF LEFT ANKLE: Status: ACTIVE | Noted: 2025-06-21

## 2025-06-22 PROBLEM — E83.52 HYPERCALCEMIA: Status: ACTIVE | Noted: 2025-06-22

## 2025-06-22 PROBLEM — G43.909 MIGRAINES: Status: ACTIVE | Noted: 2025-06-22

## 2025-06-22 PROBLEM — N18.30 STAGE 3 CHRONIC KIDNEY DISEASE: Status: ACTIVE | Noted: 2025-06-22

## 2025-06-22 PROBLEM — E78.5 HYPERLIPIDEMIA: Status: ACTIVE | Noted: 2025-06-22

## 2025-06-22 PROBLEM — S92.302A MULTIPLE CLOSED FRACTURES OF METATARSAL BONE OF LEFT FOOT: Status: ACTIVE | Noted: 2025-06-22

## 2025-06-22 PROBLEM — F41.9 ANXIETY AND DEPRESSION: Status: ACTIVE | Noted: 2025-06-22

## 2025-06-22 PROBLEM — M85.80 OSTEOPENIA: Status: ACTIVE | Noted: 2025-06-22

## 2025-06-22 PROBLEM — F32.A ANXIETY AND DEPRESSION: Status: ACTIVE | Noted: 2025-06-22

## 2025-06-22 LAB
ABORH RETYPE: NORMAL
ABSOLUTE EOSINOPHIL (OHS): 0.28 K/UL
ABSOLUTE MONOCYTE (OHS): 1.18 K/UL (ref 0.3–1)
ABSOLUTE NEUTROPHIL COUNT (OHS): 9.17 K/UL (ref 1.8–7.7)
ALBUMIN SERPL BCP-MCNC: 3 G/DL (ref 3.5–5.2)
ALP SERPL-CCNC: 53 UNIT/L (ref 40–150)
ALT SERPL W/O P-5'-P-CCNC: 13 UNIT/L (ref 10–44)
ANION GAP (OHS): 13 MMOL/L (ref 8–16)
AST SERPL-CCNC: 26 UNIT/L (ref 11–45)
BACTERIA #/AREA URNS AUTO: NORMAL /HPF
BASOPHILS # BLD AUTO: 0.07 K/UL
BASOPHILS NFR BLD AUTO: 0.6 %
BILIRUB SERPL-MCNC: 0.3 MG/DL (ref 0.1–1)
BILIRUB UR QL STRIP.AUTO: NEGATIVE
BUN SERPL-MCNC: 28 MG/DL (ref 8–23)
CALCIUM SERPL-MCNC: 8.9 MG/DL (ref 8.7–10.5)
CHLORIDE SERPL-SCNC: 101 MMOL/L (ref 95–110)
CLARITY UR: CLEAR
CO2 SERPL-SCNC: 25 MMOL/L (ref 23–29)
COLOR UR AUTO: YELLOW
CREAT SERPL-MCNC: 1.4 MG/DL (ref 0.5–1.4)
ERYTHROCYTE [DISTWIDTH] IN BLOOD BY AUTOMATED COUNT: 12 % (ref 11.5–14.5)
GFR SERPLBLD CREATININE-BSD FMLA CKD-EPI: 37 ML/MIN/1.73/M2
GLUCOSE SERPL-MCNC: 96 MG/DL (ref 70–110)
GLUCOSE UR QL STRIP: NEGATIVE
HCT VFR BLD AUTO: 34 % (ref 37–48.5)
HGB BLD-MCNC: 11.4 GM/DL (ref 12–16)
HGB UR QL STRIP: NEGATIVE
HOLD SPECIMEN: NORMAL
IMM GRANULOCYTES # BLD AUTO: 0.04 K/UL (ref 0–0.04)
IMM GRANULOCYTES NFR BLD AUTO: 0.3 % (ref 0–0.5)
INDIRECT COOMBS: NORMAL
INR PPP: 1.1 (ref 0.8–1.2)
KETONES UR QL STRIP: ABNORMAL
LEUKOCYTE ESTERASE UR QL STRIP: ABNORMAL
LYMPHOCYTES # BLD AUTO: 1.58 K/UL (ref 1–4.8)
MAGNESIUM SERPL-MCNC: 1.7 MG/DL (ref 1.6–2.6)
MCH RBC QN AUTO: 33.7 PG (ref 27–31)
MCHC RBC AUTO-ENTMCNC: 33.5 G/DL (ref 32–36)
MCV RBC AUTO: 101 FL (ref 82–98)
MICROSCOPIC COMMENT: NORMAL
NITRITE UR QL STRIP: NEGATIVE
NUCLEATED RBC (/100WBC) (OHS): 0 /100 WBC
OHS QRS DURATION: 88 MS
OHS QTC CALCULATION: 459 MS
PH UR STRIP: 8 [PH]
PLATELET # BLD AUTO: 250 K/UL (ref 150–450)
PMV BLD AUTO: 11 FL (ref 9.2–12.9)
POTASSIUM SERPL-SCNC: 3.5 MMOL/L (ref 3.5–5.1)
PROT SERPL-MCNC: 5.8 GM/DL (ref 6–8.4)
PROT UR QL STRIP: NEGATIVE
PROTHROMBIN TIME: 11.7 SECONDS (ref 9–12.5)
RBC # BLD AUTO: 3.38 M/UL (ref 4–5.4)
RBC #/AREA URNS AUTO: 3 /HPF (ref 0–4)
RELATIVE EOSINOPHIL (OHS): 2.3 %
RELATIVE LYMPHOCYTE (OHS): 12.8 % (ref 18–48)
RELATIVE MONOCYTE (OHS): 9.6 % (ref 4–15)
RELATIVE NEUTROPHIL (OHS): 74.4 % (ref 38–73)
RH BLD: NORMAL
SODIUM SERPL-SCNC: 139 MMOL/L (ref 136–145)
SP GR UR STRIP: 1.01
SPECIMEN OUTDATE: NORMAL
SQUAMOUS #/AREA URNS AUTO: 6 /HPF
UROBILINOGEN UR STRIP-ACNC: NEGATIVE EU/DL
WBC # BLD AUTO: 12.32 K/UL (ref 3.9–12.7)
WBC #/AREA URNS AUTO: 1 /HPF (ref 0–5)

## 2025-06-22 PROCEDURE — G0378 HOSPITAL OBSERVATION PER HR: HCPCS

## 2025-06-22 PROCEDURE — 85025 COMPLETE CBC W/AUTO DIFF WBC: CPT | Performed by: NURSE PRACTITIONER

## 2025-06-22 PROCEDURE — 97116 GAIT TRAINING THERAPY: CPT

## 2025-06-22 PROCEDURE — 83735 ASSAY OF MAGNESIUM: CPT | Performed by: NURSE PRACTITIONER

## 2025-06-22 PROCEDURE — 86850 RBC ANTIBODY SCREEN: CPT | Performed by: NURSE PRACTITIONER

## 2025-06-22 PROCEDURE — 80053 COMPREHEN METABOLIC PANEL: CPT | Performed by: NURSE PRACTITIONER

## 2025-06-22 PROCEDURE — 97165 OT EVAL LOW COMPLEX 30 MIN: CPT

## 2025-06-22 PROCEDURE — 97162 PT EVAL MOD COMPLEX 30 MIN: CPT

## 2025-06-22 PROCEDURE — 25000003 PHARM REV CODE 250: Performed by: HOSPITALIST

## 2025-06-22 PROCEDURE — 63600175 PHARM REV CODE 636 W HCPCS: Performed by: NURSE PRACTITIONER

## 2025-06-22 PROCEDURE — 25000003 PHARM REV CODE 250: Performed by: NURSE PRACTITIONER

## 2025-06-22 PROCEDURE — 51798 US URINE CAPACITY MEASURE: CPT

## 2025-06-22 PROCEDURE — 36415 COLL VENOUS BLD VENIPUNCTURE: CPT | Performed by: NURSE PRACTITIONER

## 2025-06-22 PROCEDURE — 85610 PROTHROMBIN TIME: CPT | Performed by: NURSE PRACTITIONER

## 2025-06-22 PROCEDURE — 97530 THERAPEUTIC ACTIVITIES: CPT

## 2025-06-22 PROCEDURE — 81001 URINALYSIS AUTO W/SCOPE: CPT | Performed by: NURSE PRACTITIONER

## 2025-06-22 PROCEDURE — 36415 COLL VENOUS BLD VENIPUNCTURE: CPT | Performed by: HOSPITALIST

## 2025-06-22 RX ORDER — DICLOFENAC SODIUM 75 MG/1
75 TABLET, DELAYED RELEASE ORAL 2 TIMES DAILY
Status: ON HOLD | COMMUNITY
Start: 2025-05-29 | End: 2025-07-02 | Stop reason: HOSPADM

## 2025-06-22 RX ORDER — SUMATRIPTAN SUCCINATE 50 MG/1
100 TABLET ORAL
Status: DISCONTINUED | OUTPATIENT
Start: 2025-06-22 | End: 2025-07-03 | Stop reason: HOSPADM

## 2025-06-22 RX ORDER — HYDROCHLOROTHIAZIDE 12.5 MG/1
12.5 TABLET ORAL DAILY
Status: DISCONTINUED | OUTPATIENT
Start: 2025-06-22 | End: 2025-06-22

## 2025-06-22 RX ORDER — ATORVASTATIN CALCIUM 20 MG/1
20 TABLET, FILM COATED ORAL NIGHTLY
Status: ON HOLD | COMMUNITY

## 2025-06-22 RX ORDER — ASPIRIN 81 MG/1
81 TABLET ORAL 2 TIMES DAILY
Status: DISCONTINUED | OUTPATIENT
Start: 2025-06-22 | End: 2025-07-03 | Stop reason: HOSPADM

## 2025-06-22 RX ORDER — METAXALONE 800 MG/1
800 TABLET ORAL 3 TIMES DAILY PRN
Status: ON HOLD | COMMUNITY
End: 2025-07-02 | Stop reason: HOSPADM

## 2025-06-22 RX ORDER — LOSARTAN POTASSIUM 50 MG/1
100 TABLET ORAL DAILY
Status: DISCONTINUED | OUTPATIENT
Start: 2025-06-22 | End: 2025-06-22

## 2025-06-22 RX ORDER — PREGABALIN 50 MG/1
50 CAPSULE ORAL 2 TIMES DAILY
Status: DISCONTINUED | OUTPATIENT
Start: 2025-06-22 | End: 2025-07-03 | Stop reason: HOSPADM

## 2025-06-22 RX ORDER — FINASTERIDE 1 MG/1
1 TABLET, FILM COATED ORAL DAILY
Status: DISCONTINUED | OUTPATIENT
Start: 2025-06-22 | End: 2025-06-22

## 2025-06-22 RX ORDER — OXYCODONE HYDROCHLORIDE 5 MG/1
5 TABLET ORAL EVERY 4 HOURS PRN
Refills: 0 | Status: DISCONTINUED | OUTPATIENT
Start: 2025-06-22 | End: 2025-07-03 | Stop reason: HOSPADM

## 2025-06-22 RX ORDER — ATORVASTATIN CALCIUM 20 MG/1
20 TABLET, FILM COATED ORAL NIGHTLY
Status: DISCONTINUED | OUTPATIENT
Start: 2025-06-22 | End: 2025-07-03 | Stop reason: HOSPADM

## 2025-06-22 RX ORDER — AMOXICILLIN 250 MG
1 CAPSULE ORAL 2 TIMES DAILY
Status: DISCONTINUED | OUTPATIENT
Start: 2025-06-22 | End: 2025-06-30

## 2025-06-22 RX ORDER — MELOXICAM 15 MG/1
15 TABLET ORAL DAILY
Status: ON HOLD | COMMUNITY
Start: 2025-06-17

## 2025-06-22 RX ORDER — OXYCODONE HYDROCHLORIDE 10 MG/1
10 TABLET ORAL EVERY 4 HOURS PRN
Refills: 0 | Status: DISCONTINUED | OUTPATIENT
Start: 2025-06-22 | End: 2025-07-03 | Stop reason: HOSPADM

## 2025-06-22 RX ORDER — POLYETHYLENE GLYCOL 3350 17 G/17G
17 POWDER, FOR SOLUTION ORAL 2 TIMES DAILY PRN
Status: DISCONTINUED | OUTPATIENT
Start: 2025-06-22 | End: 2025-06-22

## 2025-06-22 RX ORDER — POLYETHYLENE GLYCOL 3350 17 G/17G
17 POWDER, FOR SOLUTION ORAL DAILY
Status: DISCONTINUED | OUTPATIENT
Start: 2025-06-22 | End: 2025-06-25

## 2025-06-22 RX ADMIN — ACETAMINOPHEN 1000 MG: 500 TABLET ORAL at 09:06

## 2025-06-22 RX ADMIN — ATORVASTATIN CALCIUM 20 MG: 20 TABLET, FILM COATED ORAL at 03:06

## 2025-06-22 RX ADMIN — ACETAMINOPHEN 1000 MG: 500 TABLET ORAL at 12:06

## 2025-06-22 RX ADMIN — PREGABALIN 50 MG: 50 CAPSULE ORAL at 05:06

## 2025-06-22 RX ADMIN — ACETAMINOPHEN 1000 MG: 500 TABLET ORAL at 05:06

## 2025-06-22 RX ADMIN — SODIUM CHLORIDE 500 ML: 0.9 INJECTION, SOLUTION INTRAVENOUS at 09:06

## 2025-06-22 RX ADMIN — POLYETHYLENE GLYCOL 3350 17 G: 17 POWDER, FOR SOLUTION ORAL at 08:06

## 2025-06-22 RX ADMIN — METHOCARBAMOL 500 MG: 500 TABLET ORAL at 08:06

## 2025-06-22 RX ADMIN — ESCITALOPRAM OXALATE 10 MG: 10 TABLET ORAL at 08:06

## 2025-06-22 RX ADMIN — METHOCARBAMOL 500 MG: 500 TABLET ORAL at 12:06

## 2025-06-22 RX ADMIN — Medication 6 MG: at 09:06

## 2025-06-22 RX ADMIN — ACETAMINOPHEN 1000 MG: 500 TABLET ORAL at 02:06

## 2025-06-22 RX ADMIN — SENNOSIDES AND DOCUSATE SODIUM 1 TABLET: 50; 8.6 TABLET ORAL at 08:06

## 2025-06-22 RX ADMIN — SODIUM CHLORIDE, POTASSIUM CHLORIDE, SODIUM LACTATE AND CALCIUM CHLORIDE 500 ML: 600; 310; 30; 20 INJECTION, SOLUTION INTRAVENOUS at 03:06

## 2025-06-22 RX ADMIN — METHOCARBAMOL 500 MG: 500 TABLET ORAL at 02:06

## 2025-06-22 RX ADMIN — MORPHINE SULFATE 2 MG: 2 INJECTION, SOLUTION INTRAMUSCULAR; INTRAVENOUS at 01:06

## 2025-06-22 RX ADMIN — Medication 6 MG: at 01:06

## 2025-06-22 RX ADMIN — ASPIRIN 81 MG: 81 TABLET, COATED ORAL at 01:06

## 2025-06-22 RX ADMIN — ASPIRIN 81 MG: 81 TABLET, COATED ORAL at 08:06

## 2025-06-22 RX ADMIN — LEVOTHYROXINE SODIUM 125 MCG: 0.12 TABLET ORAL at 05:06

## 2025-06-22 RX ADMIN — ATORVASTATIN CALCIUM 20 MG: 20 TABLET, FILM COATED ORAL at 09:06

## 2025-06-22 RX ADMIN — PREGABALIN 50 MG: 50 CAPSULE ORAL at 09:06

## 2025-06-22 RX ADMIN — SENNOSIDES AND DOCUSATE SODIUM 1 TABLET: 50; 8.6 TABLET ORAL at 09:06

## 2025-06-22 RX ADMIN — POTASSIUM BICARBONATE 50 MEQ: 978 TABLET, EFFERVESCENT ORAL at 12:06

## 2025-06-22 RX ADMIN — TRAMADOL HYDROCHLORIDE 50 MG: 50 TABLET, COATED ORAL at 12:06

## 2025-06-22 RX ADMIN — ASPIRIN 81 MG: 81 TABLET, COATED ORAL at 09:06

## 2025-06-22 NOTE — ASSESSMENT & PLAN NOTE
Pt is a 85 y.o. female who presents after a fall at home after missing a step resulting in a left trimalleolar ankle fracture .    -Initial x-ray ankle reveals an avulsion fracture involving the distal aspect of the fibula noting displacement of the distal fracture fragment. There is posterior dislocation of the tibiotalar articulation. There is questionable fracture of the medial malleolus though not confirmed.   -Ortho consulted in the ED and able to doppler left DP pulse and performed L ankle reduction w/ splinting  -Post reduction x-ray ankle reveals interval placement of overlying cast material, which limits fine bony and soft tissue detail. Improved alignment of ankle fracture and improved alignment of the ankle joint. Suspected trimalleolar ankle fracture now demonstrates anatomic alignment.   -CT Left Ankle :Trimalleolar ankle fracture with near anatomic alignment.  -MM pain management  -Ice and keep ankle elevated  -NWB LLE  -Per ortho DVT ppx: ASA 81mg bid x4 weeks   -PT/OT recs pending, suspect will need SNF  -ortho at bedside 6/22 reports plan is for surgery in the next week once swelling better and suspect could potentially need to stay in house for this but will see how therapy goes. If needs SNF potentially [facility]-SNF if able to return for same day surgery.  -Q4 Neurovascular Checks  -Fall Precautions

## 2025-06-22 NOTE — H&P
[facility] - Emergency Dept  [facility] Medicine  History & Physical    Patient Name: Caity Ashby  MRN: [id number]  Patient Class: OP- Observation  Admission Date: 6/21/2025  Attending Physician: Lisa Bloom MD   Primary Care Provider: Erasmo Jones MD         Patient information was obtained from patient, past medical records, and ER records.     Subjective:     Principal Problem:Closed displaced trimalleolar fracture of left ankle    Chief Complaint:   Chief Complaint   Patient presents with    Fall     While walking down steps. Left ankle pain, deformity noted.         HPI: Caity Ashby is a 85 y.o. female with a PMHx of HTN, HLD, arthritis, anxiety/depression, hypothyroidism, and migraines who presented to the ED for obvious LLE deformity after a fall. Patient states that she was walking down her stairs when she accidentally missed the bottom step causing her foot to go under her as she fell to the ground. Patient denies LOC or head trauma. She had immediate severe pain and a noticeable deformity to her left ankle. Denies numbness/tingling in her LLE. Denies sustaining any additional injuries. She reports prior to the fall she was in her normal state of health and denies CP, SOB, cough, fever/chills, abdominal pain, N/V/D, or urinary complaints.    In the ED, hypertensive initially otherwise VSS, afebrile. CBC unremarkable. Na 135. K 3.1. Bicarb 20. Anion gap 17. Cr 1.3 (bl~1.1). Calcium 10.6. Xray L foot with fracture involving the proximal aspect of the 2nd metatarsal and possibly 3rd. No significant overlying edema, correlation with any focal tenderness is recommended as this could reflect irregular healing of prior fracture. X-ray L ankle revealed an avulsion fracture involving the distal aspect of the fibula noting displacement of the distal fracture fragment. There is posterior dislocation of the tibiotalar articulation.There is questionable fracture of the medial malleolus  though not confirmed. Post reduction x-ray ankle revealing interval placement of overlying cast material, which limits fine bony and soft tissue detail. Improved alignment of ankle fracture and improved alignment of the ankle joint. Suspected trimalleolar ankle fracture now demonstrates anatomic alignment. CT L ankle pending. Orthopedics consulted in the ED and performed reduction of left ankle and short leg splint applied. Recommend DVT ppx with ASA bid and plan for operative fixation in the coming weeks.The patient received 2g Ancef, 4mg Zofran, 50mcg Fentanyl x2 doses.     Past Medical History:   Diagnosis Date    Hypertension     Thyroid disease        Past Surgical History:   Procedure Laterality Date    COSMETIC SURGERY      HYSTERECTOMY         Review of patient's allergies indicates:   Allergen Reactions    Boniva [ibandronate] Other (See Comments)     SHOULDER PAIN    Demerol [meperidine]     Ramipril Swelling       No current facility-administered medications on file prior to encounter.     Current Outpatient Medications on File Prior to Encounter   Medication Sig    escitalopram oxalate (LEXAPRO) 10 MG tablet Take 10 mg by mouth.    estrogens, conjugated, (PREMARIN) 0.625 MG tablet Take 0.625 mg by mouth once daily.    finasteride (PROPECIA) 1 mg tablet Take 1 mg by mouth.     levothyroxine (SYNTHROID) 125 MCG tablet TAKE 1 TABLET BY MOUTH EVERY MORNING    losartan (COZAAR) 100 MG tablet TAKE 1 TABLET BY MOUTH EVERY DAY    sumatriptan (IMITREX) 100 MG tablet     EPINEPHrine (EPIPEN) 0.3 mg/0.3 mL AtIn Inject 0.3 mLs (0.3 mg total) into the muscle once. for 1 dose    hydroCHLOROthiazide (HYDRODIURIL) 12.5 MG Tab TAKE 1 TABLET BY MOUTH EVERY DAY    valACYclovir (VALTREX) 500 MG tablet TAKE 1 TABLET BY MOUTH EVERY DAY     Family History    None       Tobacco Use    Smoking status: Never    Smokeless tobacco: Never   Substance and Sexual Activity    Alcohol use: Not Currently    Drug use: Never    Sexual  activity: Yes     Partners: Male     Review of Systems   Constitutional:  Negative for appetite change, chills, fatigue and fever.   HENT:  Negative for congestion, rhinorrhea and sore throat.    Eyes:  Negative for photophobia and visual disturbance.   Respiratory:  Negative for cough, chest tightness and shortness of breath.    Cardiovascular:  Negative for chest pain and palpitations.   Gastrointestinal:  Negative for abdominal distention, abdominal pain, constipation, diarrhea, nausea and vomiting.   Genitourinary:  Negative for difficulty urinating, dysuria and frequency.   Musculoskeletal:  Positive for arthralgias, gait problem, joint swelling and myalgias. Negative for neck pain.   Skin:  Positive for color change.   Neurological:  Negative for dizziness, syncope, weakness, numbness and headaches.   Psychiatric/Behavioral:  Negative for confusion and hallucinations. The patient is not nervous/anxious.      Objective:     Vital Signs (Most Recent):  Temp: 98 °F (36.7 °C) (06/21/25 1915)  Pulse: 67 (06/21/25 2215)  Resp: 20 (06/21/25 2215)  BP: (!) 146/68 (06/21/25 2215)  SpO2: 100 % (06/21/25 2215) Vital Signs (24h Range):  Temp:  [98 °F (36.7 °C)-98.5 °F (36.9 °C)] 98 °F (36.7 °C)  Pulse:  [66-82] 67  Resp:  [18-22] 20  SpO2:  [97 %-100 %] 100 %  BP: (124-190)/(60-98) 146/68     Weight: 60.8 kg (134 lb)  Body mass index is 21.63 kg/m².     Physical Exam  Vitals and nursing note reviewed.   Constitutional:       General: She is in acute distress (appears uncomfortable).      Appearance: Normal appearance. She is not toxic-appearing or diaphoretic.   HENT:      Head: Normocephalic and atraumatic.      Mouth/Throat:      Mouth: Mucous membranes are moist.      Pharynx: Oropharynx is clear.   Eyes:      Extraocular Movements: Extraocular movements intact.      Pupils: Pupils are equal, round, and reactive to light.   Cardiovascular:      Rate and Rhythm: Normal rate and regular rhythm.      Heart sounds: Normal  heart sounds.   Pulmonary:      Effort: Pulmonary effort is normal. No respiratory distress.      Breath sounds: Normal breath sounds. No wheezing.   Abdominal:      General: Abdomen is flat. There is no distension.      Palpations: Abdomen is soft.      Tenderness: There is no abdominal tenderness. There is no guarding.   Musculoskeletal:         General: Tenderness and signs of injury present.      Cervical back: Normal range of motion.      Left ankle: Tenderness present.      Comments: LLE with short leg splint, able to wiggle toes. Toes slightly cool with cap refill of 2-3 seconds   Skin:     General: Skin is warm and dry.      Capillary Refill: Capillary refill takes 2 to 3 seconds.   Neurological:      General: No focal deficit present.      Mental Status: She is alert and oriented to person, place, and time. Mental status is at baseline.   Psychiatric:         Mood and Affect: Mood normal.         Behavior: Behavior normal.         Thought Content: Thought content normal.         Judgment: Judgment normal.              CRANIAL NERVES     CN III, IV, VI   Pupils are equal, round, and reactive to light.       Significant Labs: All pertinent labs within the past 24 hours have been reviewed.  CBC:   Recent Labs   Lab 06/21/25  1703   WBC 9.66   HGB 14.0   HCT 40.6        CMP:   Recent Labs   Lab 06/21/25  1703   *   K 3.1*   CL 98   CO2 20*      BUN 28*   CREATININE 1.3   CALCIUM 10.6*   PROT 7.5   ALBUMIN 3.8   BILITOT 0.5   ALKPHOS 68   AST 30   ALT 17   ANIONGAP 17*       Significant Imaging: I have reviewed all pertinent imaging results/findings within the past 24 hours.  Imaging Results              X-Ray Ankle Complete Left (In process)                      X-Ray Tibia Fibula 2 View Left (Final result)  Result time 06/21/25 18:15:43      Final result by Benito Waldron MD (06/21/25 18:15:43)                   Impression:      1. Distal fibular fracture noting tibiotalar dislocation.   Reimaging following closed reduction is recommended for more detailed evaluation of the distal tibia to definitively exclude fracture.      Electronically signed by: Benito Waldron MD  Date:    06/21/2025  Time:    18:15               Narrative:    EXAMINATION:  XR TIBIA FIBULA 2 VIEW LEFT; XR ANKLE COMPLETE 3 VIEW LEFT    CLINICAL HISTORY:  Unspecified fall, initial encounter    TECHNIQUE:  AP and lateral views of the left tibia and fibula were performed.  Three views left ankle.    COMPARISON:  None.    FINDINGS:  Four views left tibia fibula, three views left ankle.    There is avulsion fracture involving the distal aspect of the fibula noting displacement of the distal fracture fragment.  There is posterior dislocation of the tibiotalar articulation.  There is questionable fracture of the medial malleolus though not confirmed.  Reimaging is recommended following closed reduction to definitively exclude fracture.  There are degenerative changes of the knee.  There is osteopenia.  There is edema about the lower leg and ankle.                                       X-Ray Foot Complete Left (Final result)  Result time 06/21/25 18:15:08      Final result by Benito Waldron MD (06/21/25 18:15:08)                   Impression:      1. There is fracture involving the proximal aspect of the 2nd metatarsal and possibly 3rd.  No significant overlying edema, correlation with any focal tenderness is recommended as this could reflect irregular healing of prior fracture.  Please see separately dictated report for details of the distal tibia and fibula.      Electronically signed by: Benito Waldron MD  Date:    06/21/2025  Time:    18:15               Narrative:    EXAMINATION:  XR FOOT COMPLETE 3 VIEW LEFT    CLINICAL HISTORY:  .  Unspecified fall, initial encounter    TECHNIQUE:  AP, lateral and oblique views of the left foot were performed.    COMPARISON:  None    FINDINGS:  Two views left foot.    No acute displaced  fracture or dislocates cracks that there is osteopenia.  There are degenerative changes of the foot.  There is fracture involving the proximal aspect of the 2nd metatarsal and possibly 3rd metatarsal.  No significant edema along the dorsal aspect of the foot.  Please see separately dictated report for details of the ankle.                                       X-Ray Ankle Complete Left (Final result)  Result time 06/21/25 18:15:43      Final result by Benito Waldron MD (06/21/25 18:15:43)                   Impression:      1. Distal fibular fracture noting tibiotalar dislocation.  Reimaging following closed reduction is recommended for more detailed evaluation of the distal tibia to definitively exclude fracture.      Electronically signed by: Benito Waldron MD  Date:    06/21/2025  Time:    18:15               Narrative:    EXAMINATION:  XR TIBIA FIBULA 2 VIEW LEFT; XR ANKLE COMPLETE 3 VIEW LEFT    CLINICAL HISTORY:  Unspecified fall, initial encounter    TECHNIQUE:  AP and lateral views of the left tibia and fibula were performed.  Three views left ankle.    COMPARISON:  None.    FINDINGS:  Four views left tibia fibula, three views left ankle.    There is avulsion fracture involving the distal aspect of the fibula noting displacement of the distal fracture fragment.  There is posterior dislocation of the tibiotalar articulation.  There is questionable fracture of the medial malleolus though not confirmed.  Reimaging is recommended following closed reduction to definitively exclude fracture.  There are degenerative changes of the knee.  There is osteopenia.  There is edema about the lower leg and ankle.                                      Assessment/Plan:     Assessment & Plan  Closed displaced trimalleolar fracture of left ankle  Pt is a 85 y.o. female who presents after a fall at home after missing a step resulting in a left trimalleolar ankle fracture.    -Initial x-ray ankle reveals an avulsion fracture  involving the distal aspect of the fibula noting displacement of the distal fracture fragment. There is posterior dislocation of the tibiotalar articulation. There is questionable fracture of the medial malleolus though not confirmed.   -Ortho consulted in the ED and able to doppler left DP pulse and performed L ankle reduction w/ splinting  -Post reduction x-ray ankle reveals interval placement of overlying cast material, which limits fine bony and soft tissue detail. Improved alignment of ankle fracture and improved alignment of the ankle joint. Suspected trimalleolar ankle fracture now demonstrates anatomic alignment.   -CT Left Ankle pending for operative planning  -MM pain management  -Ice and keep ankle elevated  -NWB LLE  -Per ortho DVT ppx: ASA 81mg bid x4 weeks   -PT/OT  -Q4 Neurovascular Checks  -Fall Precautions  Hypokalemia  Patient's most recent potassium results are listed below.   Recent Labs     06/21/25  1703   K 3.1*     Plan  - Replete potassium per protocol  - Monitor potassium Daily  - Patient's hypokalemia is stable  Hypertension  Patients blood pressure range in the last 24 hours was: BP  Min: 124/98  Max: 190/81.The patient's inpatient anti-hypertensive regimen is listed below:  Current Antihypertensives  hydroCHLOROthiazide tablet 12.5 mg, Daily, Oral  losartan tablet 100 mg, Daily, Oral    Plan  - BP is controlled, no changes needed to their regimen  Hypothyroidism  Patient has chronic hypothyroidism. TFTs reviewed-   Lab Results   Component Value Date    TSH 2.778 01/21/2023     -Will continue chronic levothyroxine and adjust for and clinical changes.  Primary osteoarthritis of left knee  -History noted.  -Pt on celebrex and PRN voltaren and skelaxin at home.  -Pain management as above.  Hyperlipidemia   Patient is chronically on statin.will continue for now. Monitor clinically. Last LDL was   Lab Results   Component Value Date    LDLCALC 94 12/16/2020      Stage 3 chronic kidney  disease  No previous diagnosis in chart but GFR appears to have been 40-50 since 2020.  Creatine stable for now. BMP reviewed- noted Estimated Creatinine Clearance: 30.7 mL/min (based on SCr of 1.3 mg/dL). according to latest data. Based on current GFR, CKD stage is stage 3 - GFR 30-59.  Monitor UOP and serial BMP and adjust therapy as needed. Renally dose meds. Avoid nephrotoxic medications and procedures.  Migraines  -History noted. No acute issues.  -PRN sumitriptan.  Anxiety and depression  Patient has recurrent depression which is unknown and is currently controlled. Will Continue anti-depressant medications. We will not consult psychiatry at this time. Patient does not display psychosis at this time. Continue to monitor closely and adjust plan of care as needed.  -Continue lexapro.    VTE Risk Mitigation (From admission, onward)           Ordered     IP VTE HIGH RISK PATIENT  Once         06/21/25 2305     Place sequential compression device  Until discontinued         06/21/25 2305                    SDOH Screening:  The patient was screened for utility difficulties, food insecurity, transport difficulties, housing insecurity, and interpersonal safety and there were no concerns identified this admission.                   On 06/22/2025, patient should be placed in hospital observation services under my care in collaboration with Moris Liz MD.           Radha Davis NP  Department of Hospital Medicine  [facility] - Emergency Dept

## 2025-06-22 NOTE — PT/OT/SLP EVAL
Occupational Therapy   Evaluation and Treatment    Co-evaluation with PT to have 2 skilled therapists present to safely assess pt's functional mobility.     Name: Caity Ashby  MRN: [id number]  Admitting Diagnosis: Closed displaced trimalleolar fracture of left ankle  Recent Surgery: * No surgery found *      Recommendations:     Discharge Recommendations: High Intensity Therapy  Discharge Equipment Recommendations:  3-in-1 commode, walker, rolling, shower chair  Barriers to discharge:  Other (Comment) (increased (A) required with ADLs and mobility)    Assessment:     Caity Ashby is a 85 y.o. female with a medical diagnosis of Closed displaced trimalleolar fracture of left ankle.  Pt tolerated session well and without incident, but she requires (A) with ADLs and mobility.  Due to her current level of function compared to her PLOF and her home environment, high intensity therapy recommended at d/c for maximal pt gains in functional independence and to ensure her safety upon returning home.  She presents with the following.  Performance deficits affecting function: weakness, impaired endurance, impaired self care skills, impaired functional mobility, gait instability, impaired balance, pain, orthopedic precautions, decreased lower extremity function, decreased coordination.      Rehab Prognosis: Good; patient would benefit from acute skilled OT services to address these deficits and reach maximum level of function.       Plan:     Patient to be seen 4 x/week to address the above listed problems via self-care/home management, therapeutic exercises, therapeutic activities  Plan of Care Expires: 07/22/25  Plan of Care Reviewed with: patient, spouse    Subjective     Chief Complaint: decreased level of function  Patient/Family Comments/goals: to get stronger    Occupational Profile:  Living Environment: Pt lives with her  in a 2  with 1 CARMEN and 15 stairs to the 2nd floor with LHR and partial RHR  "ascending that she can reach simultaneously.  She was living on the 2nd floor but can live on the 1st floor, which has a walk-in shower.  Pt said she has portable suction grab bars; therapist educated pt and her  on safety concerns.  She reports one recent fall, which led to her current admission.  Previous level of function: Independent with ADLs and mobility; pt drives  Roles and Routines: wife; retired   Equipment Used at Home: other (see comments), grab bar (HurryCane not used)  Assistance upon Discharge: Pt's  is retired but has concerns with assisting her in and out of the car.     Pain/Comfort:  Pain Rating 1: other (see comments) (not rated but pt said "a little")  Location - Side 1: Left  Location - Orientation 1: generalized  Location 1: ankle  Pain Addressed 1: Distraction  Pain Rating Post-Intervention 1: other (see comments) (unchanged)    Patients cultural, spiritual, Advent conflicts given the current situation: no    Objective:     Communicated with: nurse and PT prior to session.  Patient found HOB elevated with peripheral IV, PureWick, Other (comments), SCD (IV not connected and running) with her  present upon OT entry to room.    General Precautions: Standard, fall  Orthopedic Precautions: LLE non weight bearing  Braces:  (cast LLE)  Respiratory Status: Room air    Occupational Performance:    Bed Mobility:    Patient completed Supine to Sit to the L with stand by assistance  Pt scooted to EOB to place her feet on the floor with SBA    Functional Mobility/Transfers:  Patient completed Sit <> Stand Transfer from EOB x 1 trial with minimum assistance with rolling walker   Patient completed Bed <> Chair Transfer using Step Transfer technique with minimum assistance and of 2 persons with rolling walker, requiring cueing for hand and LE placement   Functional Mobility: Pt ambulated ~5 ft with Min A of 2 with RW via hop technique before fatiguing.  She was unsteady.  "     Activities of Daily Living:  Upper Body Dressing: minimum assistance to don back gown as a robe/manage lines while seated EOB  Lower Body Dressing: stand by assistance to reach and adjust non-skid sock on her R foot while seated EOB   Pt declined to perform grooming     Cognitive/Visual Perceptual:  Cognitive/Psychosocial Skills:     -       Oriented to: Person, Place, Time, and Situation   -       Follows Commands/attention:Follows multistep  commands  -       Communication: clear/fluent    Physical Exam:  Upper Extremity Range of Motion:     -       Right Upper Extremity: WFL  -       Left Upper Extremity: WFL  Upper Extremity Strength:    -       Right Upper Extremity: WFL  -       Left Upper Extremity: WFL   Strength:    -       Right Upper Extremity: WFL  -       Left Upper Extremity: WFL  Fine Motor Coordination:    -       Intact  Left hand thumb/finger opposition skills and Right hand thumb/finger opposition skills    AMPAC 6 Click ADL:  AMPAC Total Score: 16    Treatment & Education:  Pt and her  edu on role of OT, POC, her NWB LLE precautions, safety when performing self care tasks, benefit of performing OOB activity, and safety when performing functional transfers and functional mobility.    - Self care tasks completed-- as noted above      Patient left up in chair with all lines intact, call button in reach, and her  present    GOALS:   Multidisciplinary Problems       Occupational Therapy Goals          Problem: Occupational Therapy    Goal Priority Disciplines Outcome Interventions   Occupational Therapy Goal     OT, PT/OT Progressing    Description: Goals to be met by: 7/6/2025     Patient will increase functional independence with ADLs by performing:    UE Dressing with Modified [place].  LE Dressing with Stand-by Assistance using appropriate AE as needed.  Grooming while standing at sink with Stand-by Assistance.  Toileting from 3-in-1 commode over toilet with Stand-by  Assistance for hygiene and clothing management.   Supine to sit with Modified [place].  Step transfer with Stand-by Assistance with RW.  Toilet transfer to 3-in-1 commode over toilet with Stand-by Assistance with RW.                         DME Justifications:  Caity's mobility limitation cannot be sufficiently resolved by the use of a cane. Her functional mobility deficit can be sufficiently resolved with the use of a Rolling Walker. Patient's mobility limitation significantly impairs their ability to participate in one of more activities of daily living.  The use of a RW will significantly improve the patient's ability to participate in MRADLS and the patient will use it on regular basis in the home.    Patient has a mobility limitation that significantly impairs their ability to participate in one or more mobility related activities of daily living, including toileting. This deficit can be resolved by using a bedside commode. Patient demonstrates mobility limitations that will cause them to be confined to one room at home without bathroom access for up to 30 days. Using a bedside commode will greatly improve the patient's ability to participate in MRADLs.     History:     Past Medical History:   Diagnosis Date    Hypertension     Thyroid disease          Past Surgical History:   Procedure Laterality Date    COSMETIC SURGERY      HYSTERECTOMY         Time Tracking:     OT Date of Treatment: 06/22/25  OT Start Time: 1241  OT Stop Time: 1307  OT Total Time (min): 26 min    Billable Minutes:Evaluation 10 min  Therapeutic Activity 16 min    6/22/2025

## 2025-06-22 NOTE — ASSESSMENT & PLAN NOTE
Hypercalcemia is likely due to likely volume depletion on admit and improved after IVF. The patient has the following symptoms due to their hypercalcemia: None (asymptomatic). Their most recent calcium and albumin results are listed below.  Recent Labs     06/21/25  1703 06/22/25  0344   CALCIUM 10.6* 8.9   ALBUMIN 3.8 3.0*     Plan  - Obtain the following labs to work up the cause of hypercalcemia: none.   - Treat the hypercalcemia with: IVF bolus.   - The patient's hypercalcemia is resolved.

## 2025-06-22 NOTE — PT/OT/SLP EVAL
"Physical Therapy Evaluation    Patient Name:  Caity Ashby   MRN:  [id number]    Recommendations:     Discharge Recommendations: High Intensity Therapy   Discharge Equipment Recommendations: bedside commode, walker, rolling, shower chair   Barriers to discharge: Decreased caregiver support and Increased skilled assistance required    Assessment:   Co-evaluation performed due to multiple deficits anticipated requiring two skilled therapists to appropriately and safely assess patient's strength, endurance, functional mobility, and ADL performance while facilitating functional tasks in addition to accommodating for patient's activity tolerance and medical acuity.     Caity Ashby is a 85 y.o. female admitted with a medical diagnosis of Closed displaced trimalleolar fracture of left ankle. Patient presented with increased motivation to participate in evaluation, with good response to completed activities and provided education. Visualized bed mobility of good quality this date. Prior to stance-based activities, patient educated on orthopedic precautions and how to utilize RW. Light assistance required for sit<>stand & no physical assistance for sustained standing. Patient requiring light assistance of 2 persons for gait training due to impaired AD management & heavy landing on RLE given LLE NWB orders. Gait limited secondary to patient reporting heightened pain with implementation of "hop" step sequencing. Based upon information gained, PT recommends high intensity skilled physical therapy services post-acutely. Provided recommendation based upon needed intensity to not only directly address patient's previously listed functional impairments, discrepancy between functional baseline & current mobility status, and increased falls risk, but to also positively impact patient's quality of life & intervene on high risk for caregiver burnout. Performance deficits impacting function include weakness, impaired " "endurance, impaired functional mobility, impaired balance, gait instability, decreased lower extremity function, decreased safety awareness, pain, decreased ROM, impaired coordination, orthopedic precautions.    Rehab Prognosis: Good; patient would benefit from acute skilled PT services to address these deficits and reach maximum level of function.    Recent Surgery: * No surgery found *      Plan:     During this hospitalization, patient to be seen 4 x/week to address the identified rehab impairments via gait training, therapeutic activities, therapeutic exercises, neuromuscular re-education and progress toward the following goals:    Plan of Care Expires:  07/22/25    Subjective     Chief Complaint: Patient's spouse stating, "I'm concerned about getting into and out of the car"  Patient/Family Comments/goals: To get better  Pain/Comfort:  Pain Rating 1: Intensity not provided  Location - Side 1: Left  Location - Orientation 1: generalized  Location 1: ankle  Pain Addressed 1: Reposition, Distraction  Pain Rating Post-Intervention 1: Intensity not provided    Patients cultural, spiritual, Judaism conflicts given the current situation: no    Social History:  Residence: Patient lives with their spouse in a 2 story house with a TTE. Patient's bed/bath on 2nd floor, however is able to stay on the 1st floor. Patient's 1st floor bathroom has a WIS.  Equipment Owned: cane, straight, grab bar  Prior level of function:  Prior to admission, patient was independent for ambulation & all other functional mobility  Assistance Upon Discharge: Spouse - limited physical assistance    Objective:     Communicated with RN prior to session.  Patient found HOB elevated with perineural catheter, PureWick, SCD  upon PT entry to room.    General Precautions: Standard, fall   Orthopedic Precautions:LLE non weight bearing   Braces:  (cast)   Body mass index is 21.63 kg/m².  Oxygen Device: Room Air  Vitals: BP (!) 90/49 (BP Location: Left " "arm, Patient Position: Lying)   Pulse 68   Temp 98.1 °F (36.7 °C) (Oral)   Resp 18   Ht 5' 6" (1.676 m)   Wt 60.8 kg (133 lb 15.9 oz)   SpO2 (!) 92%   BMI 21.63 kg/m²     Exams:  Cognition:   Alert and Cooperative   Patient is oriented to Person, Place, Time, Situation  Command following: Follows multistep verbal commands  Fluency: clear/fluent  Skin Integrity: Intact soft cast present at distal LLE  Postural Assessment: rounded shoulders and forward head  Physical Exam:    Left LE Right LE   Sensation intact to light touch ; limited secondary to cast, however patient endorsing no sensory changes at LE underneath cast intact to light touch   Coordination not tested normal     LLE ROM: WFL except ankle PROM  RLE ROM: WFL    LLE Strength (out of 5):   Hip Flexion:4: Holds test positioning against MODERATE pressure  Hip Abduction:4+: Holds test position against MODERATE to STRONG pressure  Knee Extension:4+: Holds test position against MODERATE to STRONG pressure  Ankle Dorsiflexion: CRYSTAL    RLE Strength (out of 5):   Hip Flexion:4: Holds test positioning against MODERATE pressure  Hip Abduction:4+: Holds test position against MODERATE to STRONG pressure  Knee Extension:4+: Holds test position against MODERATE to STRONG pressure  Ankle Dorsiflexion:4+: Holds test position against MODERATE to STRONG pressure    Functional Mobility:    Bed Mobility:     EOB Scooting: Anterior: Stand-By Assistance  Supine>Sit: Stand-By Assistance with HOB Elevated    Transfers:     Sit<>Stand: Minimal Assistance from Edge of Bed with Rolling Walker  Bed>Chair: Minimal Assistance (x2) with Rolling Walker using step transfer technique  *VC/TC for safety awareness, upright posturing, LLE NWB, step sequencing, AD positioning/management  *Facilitation of trunk/hip extension, AD positioning/management/stabilization, maintenance of ISIAH    Gait:  x5ft, Minimal Assistance (x2) with Rolling Walker  Gait Assessment: decreased step length, " decreased ed, decreased gait speed, unsteady gait, tendency for heavy RLE weight acceptance, impaired AD management, tendency to ambulate outside AD ISIAH   Total Distance: 5ft  *VC/TC for upright posturing, safety awareness, LLE NWB, step sequencing, AD positioning/management  *Facilitation of trunk/hip extension, softer RLE landing, AD positioning/management/stabilization    Balance:   Static Sitting: Supervision  Dynamic Sitting: Stand-By Assistance    Static Standing: Contact Guard Assistance  Dynamic Standing: Contact Guard Assistance - Minimal Assistance  *VC/TC for upright posturing, safety awareness, LLE NWB, AD positioning/management  *Facilitation of trunk/hip extension, AD positioning/management/stabilization    AM-PAC 6 CLICK MOBILITY  Total Score:15     Treatment & Education:  Patient Education Provided on:  The role of physical therapy and how the patient can benefit from skilled services  The negative effects of prolonged bed rest/sedentary behavior, along with the importance of OOB activity & patient participation with PT  Patient encouraged to sit UIC  The importance of contacting RN, via call light, for mobility throughout the day  Pt white board updated with current therapists name and level of mobility assistance needed.     Patient Verbalized understanding of all topics touched on this date. All questions answered within the PT scope of practice    Patient left up in chair with all lines intact, call button in reach, RN notified, and spouse present.    GOALS:   Multidisciplinary Problems       Physical Therapy Goals          Problem: Physical Therapy    Goal Priority Disciplines Outcome Interventions   Physical Therapy Goal     PT, PT/OT     Description: Goals to be met by: 25     Patient will increase functional independence with mobility by performin. Supine to sit with [place]up [place]  2. Sit to supine with Contact Guard Assistance  3. Sit to stand transfer with Stand-by  Assistance using LRAD as appropriate  4. Bed to chair transfer with Contact Guard Assistance using LRAD as appropriate  5. Gait  x 50 feet with Contact Guard Assistance using LRAD as appropriate.   6. Ascend/Descend 4 inch curb step with Minimal Assistance using LRAD as appropriate.  7. Stand for 8 minutes with Stand-by Assistance using LRAD as appropriate  8. Lower extremity exercise program x30 reps per handout, with assistance as needed    DME Justifications (see above for complete DME recommendations)    Bedside Commode- Patient has a mobility limitation that significantly impairs their ability to participate in one or more mobility related activities of daily living, including toileting. This deficit can be resolved by using a bedside commode. Patient demonstrates mobility limitations that will cause them to be confined to one room at home without bathroom access for up to 30 days. Using a bedside commode will greatly improve the patient's ability to participate in MRADLs.     Rolling Walker- Patient demonstrates a mobility limitation that significantly impairs their ability to participate in one or more mobility related activities of daily living. Patient's mobility limitation cannot be sufficiently resolved with the use of a cane, but can be sufficiently resolved with the use of a rolling walker.The use of a rolling walker will considerably improve their ability to participate in MRADLs. Patient will use the walker on a regular basis at home.                         History:     Past Medical History:   Diagnosis Date    Hypertension     Thyroid disease        Past Surgical History:   Procedure Laterality Date    COSMETIC SURGERY      HYSTERECTOMY         Time Tracking:     PT Received On: 06/22/25  PT Start Time: 1241     PT Stop Time: 1306  PT Total Time (min): 25 min     Billable Minutes: Evaluation 10 and Gait Training 10      06/22/2025

## 2025-06-22 NOTE — HPI
Caity Ashby is a 85 y.o. female with a PMHx of HTN, HLD, arthritis, anxiety/depression, hypothyroidism, and migraines who presented to the ED for obvious LLE deformity after a fall. Patient states that she was walking down her stairs when she accidentally missed the bottom step causing her foot to go under her as she fell to the ground. Patient denies LOC or head trauma. She had immediate severe pain and a noticeable deformity to her left ankle. Denies numbness/tingling in her LLE. Denies sustaining any additional injuries. She reports prior to the fall she was in her normal state of health and denies CP, SOB, cough, fever/chills, abdominal pain, N/V/D, or urinary complaints.    In the ED, hypertensive initially otherwise VSS, afebrile. CBC unremarkable. Na 135. K 3.1. Bicarb 20. Anion gap 17. Cr 1.3 (bl~1.1). Calcium 10.6. Xray L foot with fracture involving the proximal aspect of the 2nd metatarsal and possibly 3rd. No significant overlying edema, correlation with any focal tenderness is recommended as this could reflect irregular healing of prior fracture. X-ray L ankle revealed an avulsion fracture involving the distal aspect of the fibula noting displacement of the distal fracture fragment. There is posterior dislocation of the tibiotalar articulation.There is questionable fracture of the medial malleolus though not confirmed. Post reduction x-ray ankle revealing interval placement of overlying cast material, which limits fine bony and soft tissue detail. Improved alignment of ankle fracture and improved alignment of the ankle joint. Suspected trimalleolar ankle fracture now demonstrates anatomic alignment. CT L ankle pending. Orthopedics consulted in the ED and performed reduction of left ankle and short leg splint applied. Recommend DVT ppx with ASA bid and plan for operative fixation in the coming weeks.The patient received 2g Ancef, 4mg Zofran, 50mcg Fentanyl x2 doses.

## 2025-06-22 NOTE — ASSESSMENT & PLAN NOTE
Patient is chronically on statin.will continue for now. Monitor clinically. Last LDL was   Lab Results   Component Value Date    LDLCALC 94 12/16/2020

## 2025-06-22 NOTE — ED NOTES
Pt alert and oriented,talking to .Pt tolerated PO challenge without any difficulty.Pt denies any pain at this time.

## 2025-06-22 NOTE — PLAN OF CARE
Problem: Occupational Therapy  Goal: Occupational Therapy Goal  Description: Goals to be met by: 7/6/2025     Patient will increase functional independence with ADLs by performing:    UE Dressing with Modified [place].  LE Dressing with Stand-by Assistance using appropriate AE as needed.  Grooming while standing at sink with Stand-by Assistance.  Toileting from 3-in-1 commode over toilet with Stand-by Assistance for hygiene and clothing management.   Supine to sit with Modified [place].  Step transfer with Stand-by Assistance with RW.  Toilet transfer to 3-in-1 commode over toilet with Stand-by Assistance with RW.    Outcome: Progressing     Pt evaluated and OT goals established.

## 2025-06-22 NOTE — ASSESSMENT & PLAN NOTE
Pt is a 85 y.o. female who presents after a fall at home after missing a step resulting in a left trimalleolar ankle fracture.    -Initial x-ray ankle reveals an avulsion fracture involving the distal aspect of the fibula noting displacement of the distal fracture fragment. There is posterior dislocation of the tibiotalar articulation. There is questionable fracture of the medial malleolus though not confirmed.   -Ortho consulted in the ED and able to doppler left DP pulse and performed L ankle reduction w/ splinting  -Post reduction x-ray ankle reveals interval placement of overlying cast material, which limits fine bony and soft tissue detail. Improved alignment of ankle fracture and improved alignment of the ankle joint. Suspected trimalleolar ankle fracture now demonstrates anatomic alignment.   -CT Left Ankle pending for operative planning  -MM pain management  -Ice and keep ankle elevated  -NWB LLE  -Per ortho DVT ppx: ASA 81mg bid x4 weeks   -PT/OT  -Q4 Neurovascular Checks  -Fall Precautions

## 2025-06-22 NOTE — ASSESSMENT & PLAN NOTE
Patients blood pressure range in the last 24 hours was: BP  Min: 90/49  Max: 190/81.The patient's inpatient anti-hypertensive regimen is listed below:  Current Antihypertensives       Plan  - hypotensive 6/22 so BP meds held and bolused IVF

## 2025-06-22 NOTE — ASSESSMENT & PLAN NOTE
Patient's most recent potassium results are listed below.   Recent Labs     06/21/25  1703   K 3.1*     Plan  - Replete potassium per protocol  - Monitor potassium Daily  - Patient's hypokalemia is stable

## 2025-06-22 NOTE — PLAN OF CARE
[facility] - Emergency Dept  Discharge Assessment    Primary Care Provider: Erasmo Jones MD     Discharge Assessment (most recent)       BRIEF DISCHARGE ASSESSMENT - 06/21/25 2029          Discharge Planning    Assessment Type Discharge Planning Assessment (P)      Resource/Environmental Concerns none (P)      Support Systems Spouse/significant other (P)      Equipment Currently Used at Home none (P)      Current Living Arrangements home (P)      Patient/Family Anticipates Transition to home (P)      Patient/Family Anticipated Services at Transition none (P)      DME Needed Upon Discharge  none (P)      Discharge Plan A Home with family (P)      Discharge Plan B Home with family (P)         Physical Activity    On average, how many days per week do you engage in moderate to strenuous exercise (like a brisk walk)? 0 days (P)      On average, how many minutes do you engage in exercise at this level? 0 min (P)         Financial Resource Strain    How hard is it for you to pay for the very basics like food, housing, medical care, and heating? Not very hard (P)         Housing Stability    In the last 12 months, was there a time when you were not able to pay the mortgage or rent on time? No (P)      At any time in the past 12 months, were you homeless or living in a shelter (including now)? No (P)         Transportation Needs    In the past 12 months, has lack of transportation kept you from medical appointments or from getting medications? No (P)      In the past 12 months, has lack of transportation kept you from meetings, work, or from getting things needed for daily living? No (P)         Food Insecurity    Within the past 12 months, you worried that your food would run out before you got the money to buy more. Never true (P)      Within the past 12 months, the food you bought just didn't last and you didn't have money to get more. Never true (P)         Stress    Do you feel stress - tense, restless, nervous, or  anxious, or unable to sleep at night because your mind is troubled all the time - these days? Not at all (P)         Social Isolation    How often do you feel lonely or isolated from those around you?  Never (P)         Alcohol Use    Q1: How often do you have a drink containing alcohol? Never (P)      Q2: How many drinks containing alcohol do you have on a typical day when you are drinking? Patient does not drink (P)      Q3: How often do you have six or more drinks on one occasion? Never (P)         Utilities    In the past 12 months has the electric, gas, oil, or water company threatened to shut off services in your home? No (P)         Health Literacy    How often do you need to have someone help you when you read instructions, pamphlets, or other written material from your doctor or pharmacy? Never (P)                      Sw met pt at bedside, pt stated she ambulates without assistance. Pt has no acute case management needs at this time.      Tracy Lopez LMSW  Case Management  Emergency Department  [phone]

## 2025-06-22 NOTE — PLAN OF CARE
Problem: Adult Inpatient Plan of Care  Goal: Plan of Care Review  Outcome: Progressing  Goal: Patient-Specific Goal (Individualized)  Outcome: Progressing  Goal: Absence of Hospital-Acquired Illness or Injury  Outcome: Progressing  Goal: Optimal Comfort and Wellbeing  Outcome: Progressing  Goal: Readiness for Transition of Care  Outcome: Progressing     Problem: Skin Injury Risk Increased  Goal: Skin Health and Integrity  Outcome: Progressing     Problem: Fall Injury Risk  Goal: Absence of Fall and Fall-Related Injury  Outcome: Progressing     Problem: Fatigue  Goal: Improved Activity Tolerance  Outcome: Progressing     Problem: Infection  Goal: Absence of Infection Signs and Symptoms  Outcome: Progressing

## 2025-06-22 NOTE — ASSESSMENT & PLAN NOTE
Patient has recurrent depression which is unknown and is currently controlled. Will Continue anti-depressant medications. We will not consult psychiatry at this time. Patient does not display psychosis at this time. Continue to monitor closely and adjust plan of care as needed.  -Continue lexapro.

## 2025-06-22 NOTE — SUBJECTIVE & OBJECTIVE
Interval history- seen this morning with  at bedside as well with dr murray from orthopedics. He told them that plan is for monitoring and then surgery likely later this week for definitive fixation after reduction yesterday with orthopedics. PT/OT consulted with Ellis ruggiero. They asked about what kind of movements you can do when NWB and discussed transfers from bed to chair/commode but when using RW have to somewhat hop or side hop in RW and that will be NWB most likely for weeks after surgery and that sometimes are allowed to upgrade to partial WB or boot in a few weeks but will all depend on ortho recs after surgery. ASA x 4 weeks per ortho now. K replaecd on admit. Ca higher on admitl ikely from volume down and normalized now. BP downtrend now and improved with bolus on admit but now lower again and holding home bP meds and bolus given again this morning. Did not really at all yesterday as was in ER a long time getting procedure reduction at bedside and then admitted overnight so likely element of volume down contributing to lower BPS and will trend how it goes. Suspect will need to remain in house for fixation given will have surgery in next few days per ortho and is typically very difficult to bring back for surgery and return to SNF but will see how PT/Ot goes and talk to CM tomorrow and go from there.       Review of patient's allergies indicates:   Allergen Reactions    Boniva [ibandronate] Other (See Comments)     SHOULDER PAIN    Demerol [meperidine]     Ramipril Swelling       No current facility-administered medications on file prior to encounter.     Current Outpatient Medications on File Prior to Encounter   Medication Sig    diclofenac (VOLTAREN) 75 MG EC tablet Take 75 mg by mouth 2 (two) times daily.    escitalopram oxalate (LEXAPRO) 10 MG tablet Take 10 mg by mouth.    estrogens, conjugated, (PREMARIN) 0.625 MG tablet Take 0.625 mg by mouth once daily.    finasteride (PROPECIA) 1 mg  tablet Take 1 mg by mouth.     levothyroxine (SYNTHROID) 125 MCG tablet TAKE 1 TABLET BY MOUTH EVERY MORNING    losartan (COZAAR) 100 MG tablet TAKE 1 TABLET BY MOUTH EVERY DAY    meloxicam (MOBIC) 15 MG tablet Take 15 mg by mouth once daily.    sumatriptan (IMITREX) 100 MG tablet     atorvastatin (LIPITOR) 20 MG tablet Take 20 mg by mouth every evening.    EPINEPHrine (EPIPEN) 0.3 mg/0.3 mL AtIn Inject 0.3 mLs (0.3 mg total) into the muscle once. for 1 dose    hydroCHLOROthiazide (HYDRODIURIL) 12.5 MG Tab TAKE 1 TABLET BY MOUTH EVERY DAY    metaxalone (SKELAXIN) 800 MG tablet Take 800 mg by mouth 3 (three) times daily as needed for Pain.    valACYclovir (VALTREX) 500 MG tablet TAKE 1 TABLET BY MOUTH EVERY DAY     Family History    None       Tobacco Use    Smoking status: Never    Smokeless tobacco: Never   Substance and Sexual Activity    Alcohol use: Not Currently    Drug use: Never    Sexual activity: Yes     Partners: Male     Review of Systems   Constitutional:  Negative for appetite change, chills, fatigue and fever.   HENT:  Negative for congestion, rhinorrhea and sore throat.    Eyes:  Negative for photophobia and visual disturbance.   Respiratory:  Negative for cough, chest tightness and shortness of breath.    Cardiovascular:  Negative for chest pain and palpitations.   Gastrointestinal:  Negative for abdominal distention, abdominal pain, constipation, diarrhea, nausea and vomiting.   Genitourinary:  Negative for difficulty urinating, dysuria and frequency.   Musculoskeletal:  Positive for arthralgias, gait problem, joint swelling and myalgias. Negative for neck pain.   Skin:  Positive for color change.   Neurological:  Negative for dizziness, syncope, weakness, numbness and headaches.   Psychiatric/Behavioral:  Negative for confusion and hallucinations. The patient is not nervous/anxious.      Objective:     Vital Signs (Most Recent):  Temp: 98.1 °F (36.7 °C) (06/22/25 0711)  Pulse: 68 (06/22/25  0711)  Resp: 18 (06/22/25 0711)  BP: (!) 90/49 (06/22/25 0711)  SpO2: (!) 92 % (06/22/25 0711) Vital Signs (24h Range):  Temp:  [97.8 °F (36.6 °C)-98.5 °F (36.9 °C)] 98.1 °F (36.7 °C)  Pulse:  [61-82] 68  Resp:  [16-22] 18  SpO2:  [92 %-100 %] 92 %  BP: ()/(49-98) 90/49     Weight: 60.8 kg (133 lb 15.9 oz)  Body mass index is 21.63 kg/m².     Physical Exam  Vitals and nursing note reviewed.   Constitutional:       General: She is not in acute distress.     Appearance: Normal appearance. She is not toxic-appearing or diaphoretic.      Comments:  at bedside   HENT:      Head: Normocephalic and atraumatic.      Mouth/Throat:      Mouth: Mucous membranes are moist.      Pharynx: Oropharynx is clear.   Eyes:      Extraocular Movements: Extraocular movements intact.      Pupils: Pupils are equal, round, and reactive to light.   Cardiovascular:      Rate and Rhythm: Normal rate and regular rhythm.      Heart sounds: Normal heart sounds.   Pulmonary:      Effort: Pulmonary effort is normal. No respiratory distress.      Breath sounds: Normal breath sounds. No wheezing.   Abdominal:      General: Abdomen is flat. There is no distension.      Palpations: Abdomen is soft.      Tenderness: There is no abdominal tenderness. There is no guarding.   Musculoskeletal:         General: Tenderness and signs of injury present.      Cervical back: Normal range of motion.      Left ankle: Tenderness present.      Comments: LLE with short leg splint, able to wiggle toes. Toes slightly cool with cap refill of 2-3 seconds   Skin:     General: Skin is warm and dry.      Capillary Refill: Capillary refill takes 2 to 3 seconds.   Neurological:      General: No focal deficit present.      Mental Status: She is alert and oriented to person, place, and time. Mental status is at baseline.   Psychiatric:         Mood and Affect: Mood normal.         Behavior: Behavior normal.         Thought Content: Thought content normal.          Judgment: Judgment normal.              CRANIAL NERVES     CN III, IV, VI   Pupils are equal, round, and reactive to light.       Significant Labs: All pertinent labs within the past 24 hours have been reviewed.  CBC:   Recent Labs   Lab 06/21/25  1703 06/22/25  0344   WBC 9.66 12.32   HGB 14.0 11.4*   HCT 40.6 34.0*    250     CMP:   Recent Labs   Lab 06/21/25  1703 06/22/25  0344   * 139   K 3.1* 3.5   CL 98 101   CO2 20* 25    96   BUN 28* 28*   CREATININE 1.3 1.4   CALCIUM 10.6* 8.9   PROT 7.5 5.8*   ALBUMIN 3.8 3.0*   BILITOT 0.5 0.3   ALKPHOS 68 53   AST 30 26   ALT 17 13   ANIONGAP 17* 13       Significant Imaging: I have reviewed all pertinent imaging results/findings within the past 24 hours.  Imaging Results               CT Ankle (Including Hindfoot) Without Contrast Left (Final result)  Result time 06/22/25 03:31:00      Final result by Alvarez Sharpe MD (06/22/25 03:31:00)                   Impression:      Trimalleolar ankle fracture with near anatomic alignment.    Remote or subacute nondisplaced fracture of the 2nd metatarsal.    This report was flagged in Epic as abnormal.    Electronically signed by resident: Tito Bernard  Date:    06/22/2025  Time:    02:24    Electronically signed by: Alvarez Sharpe MD  Date:    06/22/2025  Time:    03:31               Narrative:    EXAMINATION:  CT ANKLE (INCLUDING HINDFOOT) WITHOUT CONTRAST LEFT; CT 3D RENDERING ON AN INDEPENDENT WORKSTATION    CLINICAL HISTORY:  Fracture, ankle;; Fracture, ankle;fracture;    TECHNIQUE:  CT of the left ankle was performed without the administration of intravenous contrast. 3D reconstructed images were acquired by post processing for a better visualization of the anatomic structures, with concurrent supervision and archived for permanent records.    COMPARISON:  X-ray ankle 06/21/2025, x-ray foot 06/21/2025.    FINDINGS:  Again seen are acute mildly displaced comminuted fractures of the distal  "tibia and fibula.  Satisfactory alignment of the fracture fragments.  Remote or subacute appearing nondisplaced fracture of the proximal 2nd metatarsal.  Ankle joint effusion.  No other fractures or dislocation.  Generalized osteopenia.    There is extensive soft tissue stranding about the ankle.  Calcific atherosclerosis of the partially visualized lower extremity vessels.  Cast material overlies the lower extremity.                                       X-Ray Chest 1 View (Final result)  Result time 06/22/25 00:50:12      Final result by Alvarez Sharpe MD (06/22/25 00:50:12)                   Impression:      No acute finding identified on this limited single view.      Electronically signed by: Alvarez Sharpe MD  Date:    06/22/2025  Time:    00:50               Narrative:    EXAMINATION:  XR CHEST 1 VIEW    CLINICAL HISTORY:  Provided history is "pre op eval;  ".    TECHNIQUE:  One view of the chest.    COMPARISON:  11/28/2025.    FINDINGS:  Nonspecific prominent linear metallic radiodensities overlie the chest and mediastinum.  Cardiomediastinal silhouette is not enlarged.  There are coarse interstitial lung markings but no large focal area of consolidation.  No large pleural effusion.  No pneumothorax.                                       X-Ray Ankle Complete Left (Final result)  Result time 06/21/25 23:24:03      Final result by Alvarez Sharpe MD (06/21/25 23:24:03)                   Impression:      Near anatomic alignment of previously seen ankle fracture/dislocation.      Electronically signed by: Alvarez Sharpe MD  Date:    06/21/2025  Time:    23:24               Narrative:    EXAMINATION:  XR ANKLE COMPLETE 3 VIEW LEFT    CLINICAL HISTORY:  Unspecified fall, initial encounter    TECHNIQUE:  AP, lateral and oblique views of the left ankle were performed.    COMPARISON:  06/21/2025.    FINDINGS:  Interval placement of overlying cast material, which limits fine bony and soft tissue detail.  " Improved alignment of ankle fracture and improved alignment of the ankle joint.  Suspected trimalleolar ankle fracture now demonstrates anatomic alignment.  No new acute displaced fracture.  No new dislocation.  Soft tissue edema about the ankle.  No unexpected radiopaque foreign body.                                       X-Ray Tibia Fibula 2 View Left (Final result)  Result time 06/21/25 18:15:43      Final result by Benito Waldron MD (06/21/25 18:15:43)                   Impression:      1. Distal fibular fracture noting tibiotalar dislocation.  Reimaging following closed reduction is recommended for more detailed evaluation of the distal tibia to definitively exclude fracture.      Electronically signed by: Benito Waldron MD  Date:    06/21/2025  Time:    18:15               Narrative:    EXAMINATION:  XR TIBIA FIBULA 2 VIEW LEFT; XR ANKLE COMPLETE 3 VIEW LEFT    CLINICAL HISTORY:  Unspecified fall, initial encounter    TECHNIQUE:  AP and lateral views of the left tibia and fibula were performed.  Three views left ankle.    COMPARISON:  None.    FINDINGS:  Four views left tibia fibula, three views left ankle.    There is avulsion fracture involving the distal aspect of the fibula noting displacement of the distal fracture fragment.  There is posterior dislocation of the tibiotalar articulation.  There is questionable fracture of the medial malleolus though not confirmed.  Reimaging is recommended following closed reduction to definitively exclude fracture.  There are degenerative changes of the knee.  There is osteopenia.  There is edema about the lower leg and ankle.                                       X-Ray Foot Complete Left (Final result)  Result time 06/21/25 18:15:08      Final result by Benito Waldron MD (06/21/25 18:15:08)                   Impression:      1. There is fracture involving the proximal aspect of the 2nd metatarsal and possibly 3rd.  No significant overlying edema, correlation  with any focal tenderness is recommended as this could reflect irregular healing of prior fracture.  Please see separately dictated report for details of the distal tibia and fibula.      Electronically signed by: Benito Waldron MD  Date:    06/21/2025  Time:    18:15               Narrative:    EXAMINATION:  XR FOOT COMPLETE 3 VIEW LEFT    CLINICAL HISTORY:  .  Unspecified fall, initial encounter    TECHNIQUE:  AP, lateral and oblique views of the left foot were performed.    COMPARISON:  None    FINDINGS:  Two views left foot.    No acute displaced fracture or dislocates cracks that there is osteopenia.  There are degenerative changes of the foot.  There is fracture involving the proximal aspect of the 2nd metatarsal and possibly 3rd metatarsal.  No significant edema along the dorsal aspect of the foot.  Please see separately dictated report for details of the ankle.                                       X-Ray Ankle Complete Left (Final result)  Result time 06/21/25 18:15:43      Final result by Benito Waldron MD (06/21/25 18:15:43)                   Impression:      1. Distal fibular fracture noting tibiotalar dislocation.  Reimaging following closed reduction is recommended for more detailed evaluation of the distal tibia to definitively exclude fracture.      Electronically signed by: Bentio Waldron MD  Date:    06/21/2025  Time:    18:15               Narrative:    EXAMINATION:  XR TIBIA FIBULA 2 VIEW LEFT; XR ANKLE COMPLETE 3 VIEW LEFT    CLINICAL HISTORY:  Unspecified fall, initial encounter    TECHNIQUE:  AP and lateral views of the left tibia and fibula were performed.  Three views left ankle.    COMPARISON:  None.    FINDINGS:  Four views left tibia fibula, three views left ankle.    There is avulsion fracture involving the distal aspect of the fibula noting displacement of the distal fracture fragment.  There is posterior dislocation of the tibiotalar articulation.  There is questionable fracture  of the medial malleolus though not confirmed.  Reimaging is recommended following closed reduction to definitively exclude fracture.  There are degenerative changes of the knee.  There is osteopenia.  There is edema about the lower leg and ankle.

## 2025-06-22 NOTE — SUBJECTIVE & OBJECTIVE
Past Medical History:   Diagnosis Date    Hypertension     Thyroid disease        Past Surgical History:   Procedure Laterality Date    COSMETIC SURGERY      HYSTERECTOMY         Review of patient's allergies indicates:   Allergen Reactions    Boniva [ibandronate] Other (See Comments)     SHOULDER PAIN    Demerol [meperidine]     Ramipril Swelling       No current facility-administered medications on file prior to encounter.     Current Outpatient Medications on File Prior to Encounter   Medication Sig    escitalopram oxalate (LEXAPRO) 10 MG tablet Take 10 mg by mouth.    estrogens, conjugated, (PREMARIN) 0.625 MG tablet Take 0.625 mg by mouth once daily.    finasteride (PROPECIA) 1 mg tablet Take 1 mg by mouth.     levothyroxine (SYNTHROID) 125 MCG tablet TAKE 1 TABLET BY MOUTH EVERY MORNING    losartan (COZAAR) 100 MG tablet TAKE 1 TABLET BY MOUTH EVERY DAY    sumatriptan (IMITREX) 100 MG tablet     EPINEPHrine (EPIPEN) 0.3 mg/0.3 mL AtIn Inject 0.3 mLs (0.3 mg total) into the muscle once. for 1 dose    hydroCHLOROthiazide (HYDRODIURIL) 12.5 MG Tab TAKE 1 TABLET BY MOUTH EVERY DAY    valACYclovir (VALTREX) 500 MG tablet TAKE 1 TABLET BY MOUTH EVERY DAY     Family History    None       Tobacco Use    Smoking status: Never    Smokeless tobacco: Never   Substance and Sexual Activity    Alcohol use: Not Currently    Drug use: Never    Sexual activity: Yes     Partners: Male     Review of Systems   Constitutional:  Negative for appetite change, chills, fatigue and fever.   HENT:  Negative for congestion, rhinorrhea and sore throat.    Eyes:  Negative for photophobia and visual disturbance.   Respiratory:  Negative for cough, chest tightness and shortness of breath.    Cardiovascular:  Negative for chest pain and palpitations.   Gastrointestinal:  Negative for abdominal distention, abdominal pain, constipation, diarrhea, nausea and vomiting.   Genitourinary:  Negative for difficulty urinating, dysuria and frequency.    Musculoskeletal:  Positive for arthralgias, gait problem, joint swelling and myalgias. Negative for neck pain.   Skin:  Positive for color change.   Neurological:  Negative for dizziness, syncope, weakness, numbness and headaches.   Psychiatric/Behavioral:  Negative for confusion and hallucinations. The patient is not nervous/anxious.      Objective:     Vital Signs (Most Recent):  Temp: 98 °F (36.7 °C) (06/21/25 1915)  Pulse: 67 (06/21/25 2215)  Resp: 20 (06/21/25 2215)  BP: (!) 146/68 (06/21/25 2215)  SpO2: 100 % (06/21/25 2215) Vital Signs (24h Range):  Temp:  [98 °F (36.7 °C)-98.5 °F (36.9 °C)] 98 °F (36.7 °C)  Pulse:  [66-82] 67  Resp:  [18-22] 20  SpO2:  [97 %-100 %] 100 %  BP: (124-190)/(60-98) 146/68     Weight: 60.8 kg (134 lb)  Body mass index is 21.63 kg/m².     Physical Exam  Vitals and nursing note reviewed.   Constitutional:       General: She is in acute distress (appears uncomfortable).      Appearance: Normal appearance. She is not toxic-appearing or diaphoretic.   HENT:      Head: Normocephalic and atraumatic.      Mouth/Throat:      Mouth: Mucous membranes are moist.      Pharynx: Oropharynx is clear.   Eyes:      Extraocular Movements: Extraocular movements intact.      Pupils: Pupils are equal, round, and reactive to light.   Cardiovascular:      Rate and Rhythm: Normal rate and regular rhythm.      Heart sounds: Normal heart sounds.   Pulmonary:      Effort: Pulmonary effort is normal. No respiratory distress.      Breath sounds: Normal breath sounds. No wheezing.   Abdominal:      General: Abdomen is flat. There is no distension.      Palpations: Abdomen is soft.      Tenderness: There is no abdominal tenderness. There is no guarding.   Musculoskeletal:         General: Tenderness and signs of injury present.      Cervical back: Normal range of motion.      Left ankle: Tenderness present.      Comments: LLE with short leg splint, able to wiggle toes. Toes slightly cool with cap refill of 2-3  seconds   Skin:     General: Skin is warm and dry.      Capillary Refill: Capillary refill takes 2 to 3 seconds.   Neurological:      General: No focal deficit present.      Mental Status: She is alert and oriented to person, place, and time. Mental status is at baseline.   Psychiatric:         Mood and Affect: Mood normal.         Behavior: Behavior normal.         Thought Content: Thought content normal.         Judgment: Judgment normal.              CRANIAL NERVES     CN III, IV, VI   Pupils are equal, round, and reactive to light.       Significant Labs: All pertinent labs within the past 24 hours have been reviewed.  CBC:   Recent Labs   Lab 06/21/25  1703   WBC 9.66   HGB 14.0   HCT 40.6        CMP:   Recent Labs   Lab 06/21/25  1703   *   K 3.1*   CL 98   CO2 20*      BUN 28*   CREATININE 1.3   CALCIUM 10.6*   PROT 7.5   ALBUMIN 3.8   BILITOT 0.5   ALKPHOS 68   AST 30   ALT 17   ANIONGAP 17*       Significant Imaging: I have reviewed all pertinent imaging results/findings within the past 24 hours.  Imaging Results              X-Ray Ankle Complete Left (In process)                      X-Ray Tibia Fibula 2 View Left (Final result)  Result time 06/21/25 18:15:43      Final result by Benito Waldron MD (06/21/25 18:15:43)                   Impression:      1. Distal fibular fracture noting tibiotalar dislocation.  Reimaging following closed reduction is recommended for more detailed evaluation of the distal tibia to definitively exclude fracture.      Electronically signed by: Benito Waldron MD  Date:    06/21/2025  Time:    18:15               Narrative:    EXAMINATION:  XR TIBIA FIBULA 2 VIEW LEFT; XR ANKLE COMPLETE 3 VIEW LEFT    CLINICAL HISTORY:  Unspecified fall, initial encounter    TECHNIQUE:  AP and lateral views of the left tibia and fibula were performed.  Three views left ankle.    COMPARISON:  None.    FINDINGS:  Four views left tibia fibula, three views left  ankle.    There is avulsion fracture involving the distal aspect of the fibula noting displacement of the distal fracture fragment.  There is posterior dislocation of the tibiotalar articulation.  There is questionable fracture of the medial malleolus though not confirmed.  Reimaging is recommended following closed reduction to definitively exclude fracture.  There are degenerative changes of the knee.  There is osteopenia.  There is edema about the lower leg and ankle.                                       X-Ray Foot Complete Left (Final result)  Result time 06/21/25 18:15:08      Final result by Benito Waldron MD (06/21/25 18:15:08)                   Impression:      1. There is fracture involving the proximal aspect of the 2nd metatarsal and possibly 3rd.  No significant overlying edema, correlation with any focal tenderness is recommended as this could reflect irregular healing of prior fracture.  Please see separately dictated report for details of the distal tibia and fibula.      Electronically signed by: Benito Waldron MD  Date:    06/21/2025  Time:    18:15               Narrative:    EXAMINATION:  XR FOOT COMPLETE 3 VIEW LEFT    CLINICAL HISTORY:  .  Unspecified fall, initial encounter    TECHNIQUE:  AP, lateral and oblique views of the left foot were performed.    COMPARISON:  None    FINDINGS:  Two views left foot.    No acute displaced fracture or dislocates cracks that there is osteopenia.  There are degenerative changes of the foot.  There is fracture involving the proximal aspect of the 2nd metatarsal and possibly 3rd metatarsal.  No significant edema along the dorsal aspect of the foot.  Please see separately dictated report for details of the ankle.                                       X-Ray Ankle Complete Left (Final result)  Result time 06/21/25 18:15:43      Final result by Benito Waldron MD (06/21/25 18:15:43)                   Impression:      1. Distal fibular fracture noting  tibiotalar dislocation.  Reimaging following closed reduction is recommended for more detailed evaluation of the distal tibia to definitively exclude fracture.      Electronically signed by: Benito Waldron MD  Date:    06/21/2025  Time:    18:15               Narrative:    EXAMINATION:  XR TIBIA FIBULA 2 VIEW LEFT; XR ANKLE COMPLETE 3 VIEW LEFT    CLINICAL HISTORY:  Unspecified fall, initial encounter    TECHNIQUE:  AP and lateral views of the left tibia and fibula were performed.  Three views left ankle.    COMPARISON:  None.    FINDINGS:  Four views left tibia fibula, three views left ankle.    There is avulsion fracture involving the distal aspect of the fibula noting displacement of the distal fracture fragment.  There is posterior dislocation of the tibiotalar articulation.  There is questionable fracture of the medial malleolus though not confirmed.  Reimaging is recommended following closed reduction to definitively exclude fracture.  There are degenerative changes of the knee.  There is osteopenia.  There is edema about the lower leg and ankle.

## 2025-06-22 NOTE — ASSESSMENT & PLAN NOTE
Patient has chronic hypothyroidism. TFTs reviewed-   Lab Results   Component Value Date    TSH 2.778 01/21/2023     -Will continue chronic levothyroxine and adjust for and clinical changes.

## 2025-06-22 NOTE — ASSESSMENT & PLAN NOTE
Patient's most recent potassium results are listed below.   Recent Labs     06/21/25  1703 06/22/25  0344   K 3.1* 3.5     Plan  - Replete potassium per protocol  - Monitor potassium Daily  - Patient's hypokalemia is stable

## 2025-06-22 NOTE — ASSESSMENT & PLAN NOTE
-History noted.  -Pt on celebrex and PRN voltaren and skelaxin at home.  -Pain management as above.

## 2025-06-22 NOTE — ASSESSMENT & PLAN NOTE
-2nd metatarsal fx seen and questionable 3rd MT fx but CT showing appears to likely be only 2nd MT

## 2025-06-22 NOTE — ED NOTES
Pre-procedure at bedside(pulse ox,code/respiratory cart,suction,capnography,wall suction,bp device)

## 2025-06-22 NOTE — PROGRESS NOTES
[facility] Medicine  Progress Note    Patient Name: Caity Ashby  MRN: [id number]  Patient Class: OP- Observation   Admission Date: 6/21/2025  Length of Stay: 0 days  Attending Physician: Lisa Bloom MD  Primary Care Provider: Erasmo Jones MD        Subjective     Principal Problem:Closed displaced trimalleolar fracture of left ankle        HPI:  Caity Ashby is a 85 y.o. female with a PMHx of HTN, HLD, arthritis, anxiety/depression, hypothyroidism, and migraines who presented to the ED for obvious LLE deformity after a fall. Patient states that she was walking down her stairs when she accidentally missed the bottom step causing her foot to go under her as she fell to the ground. Patient denies LOC or head trauma. She had immediate severe pain and a noticeable deformity to her left ankle. Denies numbness/tingling in her LLE. Denies sustaining any additional injuries. She reports prior to the fall she was in her normal state of health and denies CP, SOB, cough, fever/chills, abdominal pain, N/V/D, or urinary complaints.    In the ED, hypertensive initially otherwise VSS, afebrile. CBC unremarkable. Na 135. K 3.1. Bicarb 20. Anion gap 17. Cr 1.3 (bl~1.1). Calcium 10.6. Xray L foot with fracture involving the proximal aspect of the 2nd metatarsal and possibly 3rd. No significant overlying edema, correlation with any focal tenderness is recommended as this could reflect irregular healing of prior fracture. X-ray L ankle revealed an avulsion fracture involving the distal aspect of the fibula noting displacement of the distal fracture fragment. There is posterior dislocation of the tibiotalar articulation.There is questionable fracture of the medial malleolus though not confirmed. Post reduction x-ray ankle revealing interval placement of overlying cast material, which limits fine bony and soft tissue detail. Improved alignment of ankle fracture and improved alignment of the  ankle joint. Suspected trimalleolar ankle fracture now demonstrates anatomic alignment. CT L ankle pending. Orthopedics consulted in the ED and performed reduction of left ankle and short leg splint applied. Recommend DVT ppx with ASA bid and plan for operative fixation in the coming weeks.The patient received 2g Ancef, 4mg Zofran, 50mcg Fentanyl x2 doses.     Overview/Hospital Course:  No notes on file    Interval history- seen this morning with  at bedside as well with dr murray from orthopedics. He told them that plan is for monitoring and then surgery likely later this week for definitive fixation after reduction yesterday with orthopedics. PT/OT consulted with [facility] interim. They asked about what kind of movements you can do when NWB and discussed transfers from bed to chair/commode but when using RW have to somewhat hop or side hop in RW and that will be NWB most likely for weeks after surgery and that sometimes are allowed to upgrade to partial WB or boot in a few weeks but will all depend on ortho recs after surgery. ASA x 4 weeks per ortho now. K replaecd on admit. Ca higher on admitl ikely from volume down and normalized now. BP downtrend now and improved with bolus on admit but now lower again and holding home bP meds and bolus given again this morning. Did not really at all yesterday as was in ER a long time getting procedure reduction at bedside and then admitted overnight so likely element of volume down contributing to lower BPS and will trend how it goes. Suspect will need to remain in house for fixation given will have surgery in next few days per ortho and is typically very difficult to bring back for surgery and return to SNF but will see how PT/Ot goes and talk to CM tomorrow and go from there.       Review of patient's allergies indicates:   Allergen Reactions    Boniva [ibandronate] Other (See Comments)     SHOULDER PAIN    Demerol [meperidine]     Ramipril Swelling       No current  facility-administered medications on file prior to encounter.     Current Outpatient Medications on File Prior to Encounter   Medication Sig    diclofenac (VOLTAREN) 75 MG EC tablet Take 75 mg by mouth 2 (two) times daily.    escitalopram oxalate (LEXAPRO) 10 MG tablet Take 10 mg by mouth.    estrogens, conjugated, (PREMARIN) 0.625 MG tablet Take 0.625 mg by mouth once daily.    finasteride (PROPECIA) 1 mg tablet Take 1 mg by mouth.     levothyroxine (SYNTHROID) 125 MCG tablet TAKE 1 TABLET BY MOUTH EVERY MORNING    losartan (COZAAR) 100 MG tablet TAKE 1 TABLET BY MOUTH EVERY DAY    meloxicam (MOBIC) 15 MG tablet Take 15 mg by mouth once daily.    sumatriptan (IMITREX) 100 MG tablet     atorvastatin (LIPITOR) 20 MG tablet Take 20 mg by mouth every evening.    EPINEPHrine (EPIPEN) 0.3 mg/0.3 mL AtIn Inject 0.3 mLs (0.3 mg total) into the muscle once. for 1 dose    hydroCHLOROthiazide (HYDRODIURIL) 12.5 MG Tab TAKE 1 TABLET BY MOUTH EVERY DAY    metaxalone (SKELAXIN) 800 MG tablet Take 800 mg by mouth 3 (three) times daily as needed for Pain.    valACYclovir (VALTREX) 500 MG tablet TAKE 1 TABLET BY MOUTH EVERY DAY     Family History    None       Tobacco Use    Smoking status: Never    Smokeless tobacco: Never   Substance and Sexual Activity    Alcohol use: Not Currently    Drug use: Never    Sexual activity: Yes     Partners: Male     Review of Systems   Constitutional:  Negative for appetite change, chills, fatigue and fever.   HENT:  Negative for congestion, rhinorrhea and sore throat.    Eyes:  Negative for photophobia and visual disturbance.   Respiratory:  Negative for cough, chest tightness and shortness of breath.    Cardiovascular:  Negative for chest pain and palpitations.   Gastrointestinal:  Negative for abdominal distention, abdominal pain, constipation, diarrhea, nausea and vomiting.   Genitourinary:  Negative for difficulty urinating, dysuria and frequency.   Musculoskeletal:  Positive for arthralgias,  gait problem, joint swelling and myalgias. Negative for neck pain.   Skin:  Positive for color change.   Neurological:  Negative for dizziness, syncope, weakness, numbness and headaches.   Psychiatric/Behavioral:  Negative for confusion and hallucinations. The patient is not nervous/anxious.      Objective:     Vital Signs (Most Recent):  Temp: 98.1 °F (36.7 °C) (06/22/25 0711)  Pulse: 68 (06/22/25 0711)  Resp: 18 (06/22/25 0711)  BP: (!) 90/49 (06/22/25 0711)  SpO2: (!) 92 % (06/22/25 0711) Vital Signs (24h Range):  Temp:  [97.8 °F (36.6 °C)-98.5 °F (36.9 °C)] 98.1 °F (36.7 °C)  Pulse:  [61-82] 68  Resp:  [16-22] 18  SpO2:  [92 %-100 %] 92 %  BP: ()/(49-98) 90/49     Weight: 60.8 kg (133 lb 15.9 oz)  Body mass index is 21.63 kg/m².     Physical Exam  Vitals and nursing note reviewed.   Constitutional:       General: She is not in acute distress.     Appearance: Normal appearance. She is not toxic-appearing or diaphoretic.      Comments:  at bedside   HENT:      Head: Normocephalic and atraumatic.      Mouth/Throat:      Mouth: Mucous membranes are moist.      Pharynx: Oropharynx is clear.   Eyes:      Extraocular Movements: Extraocular movements intact.      Pupils: Pupils are equal, round, and reactive to light.   Cardiovascular:      Rate and Rhythm: Normal rate and regular rhythm.      Heart sounds: Normal heart sounds.   Pulmonary:      Effort: Pulmonary effort is normal. No respiratory distress.      Breath sounds: Normal breath sounds. No wheezing.   Abdominal:      General: Abdomen is flat. There is no distension.      Palpations: Abdomen is soft.      Tenderness: There is no abdominal tenderness. There is no guarding.   Musculoskeletal:         General: Tenderness and signs of injury present.      Cervical back: Normal range of motion.      Left ankle: Tenderness present.      Comments: LLE with short leg splint, able to wiggle toes. Toes slightly cool with cap refill of 2-3 seconds   Skin:      General: Skin is warm and dry.      Capillary Refill: Capillary refill takes 2 to 3 seconds.   Neurological:      General: No focal deficit present.      Mental Status: She is alert and oriented to person, place, and time. Mental status is at baseline.   Psychiatric:         Mood and Affect: Mood normal.         Behavior: Behavior normal.         Thought Content: Thought content normal.         Judgment: Judgment normal.              CRANIAL NERVES     CN III, IV, VI   Pupils are equal, round, and reactive to light.       Significant Labs: All pertinent labs within the past 24 hours have been reviewed.  CBC:   Recent Labs   Lab 06/21/25  1703 06/22/25  0344   WBC 9.66 12.32   HGB 14.0 11.4*   HCT 40.6 34.0*    250     CMP:   Recent Labs   Lab 06/21/25 1703 06/22/25  0344   * 139   K 3.1* 3.5   CL 98 101   CO2 20* 25    96   BUN 28* 28*   CREATININE 1.3 1.4   CALCIUM 10.6* 8.9   PROT 7.5 5.8*   ALBUMIN 3.8 3.0*   BILITOT 0.5 0.3   ALKPHOS 68 53   AST 30 26   ALT 17 13   ANIONGAP 17* 13       Significant Imaging: I have reviewed all pertinent imaging results/findings within the past 24 hours.  Imaging Results               CT Ankle (Including Hindfoot) Without Contrast Left (Final result)  Result time 06/22/25 03:31:00      Final result by Alvarez Sharpe MD (06/22/25 03:31:00)                   Impression:      Trimalleolar ankle fracture with near anatomic alignment.    Remote or subacute nondisplaced fracture of the 2nd metatarsal.    This report was flagged in Epic as abnormal.    Electronically signed by resident: Tito Bernard  Date:    06/22/2025  Time:    02:24    Electronically signed by: Alvarez Sharpe MD  Date:    06/22/2025  Time:    03:31               Narrative:    EXAMINATION:  CT ANKLE (INCLUDING HINDFOOT) WITHOUT CONTRAST LEFT; CT 3D RENDERING ON AN INDEPENDENT WORKSTATION    CLINICAL HISTORY:  Fracture, ankle;; Fracture, ankle;fracture;    TECHNIQUE:  CT of the left  "ankle was performed without the administration of intravenous contrast. 3D reconstructed images were acquired by post processing for a better visualization of the anatomic structures, with concurrent supervision and archived for permanent records.    COMPARISON:  X-ray ankle 06/21/2025, x-ray foot 06/21/2025.    FINDINGS:  Again seen are acute mildly displaced comminuted fractures of the distal tibia and fibula.  Satisfactory alignment of the fracture fragments.  Remote or subacute appearing nondisplaced fracture of the proximal 2nd metatarsal.  Ankle joint effusion.  No other fractures or dislocation.  Generalized osteopenia.    There is extensive soft tissue stranding about the ankle.  Calcific atherosclerosis of the partially visualized lower extremity vessels.  Cast material overlies the lower extremity.                                       X-Ray Chest 1 View (Final result)  Result time 06/22/25 00:50:12      Final result by Alvarez Sharpe MD (06/22/25 00:50:12)                   Impression:      No acute finding identified on this limited single view.      Electronically signed by: Alvarez Sharpe MD  Date:    06/22/2025  Time:    00:50               Narrative:    EXAMINATION:  XR CHEST 1 VIEW    CLINICAL HISTORY:  Provided history is "pre op eval;  ".    TECHNIQUE:  One view of the chest.    COMPARISON:  11/28/2025.    FINDINGS:  Nonspecific prominent linear metallic radiodensities overlie the chest and mediastinum.  Cardiomediastinal silhouette is not enlarged.  There are coarse interstitial lung markings but no large focal area of consolidation.  No large pleural effusion.  No pneumothorax.                                       X-Ray Ankle Complete Left (Final result)  Result time 06/21/25 23:24:03      Final result by Alvarez Sharpe MD (06/21/25 23:24:03)                   Impression:      Near anatomic alignment of previously seen ankle fracture/dislocation.      Electronically signed by: Alvarez" MD Annemarie  Date:    06/21/2025  Time:    23:24               Narrative:    EXAMINATION:  XR ANKLE COMPLETE 3 VIEW LEFT    CLINICAL HISTORY:  Unspecified fall, initial encounter    TECHNIQUE:  AP, lateral and oblique views of the left ankle were performed.    COMPARISON:  06/21/2025.    FINDINGS:  Interval placement of overlying cast material, which limits fine bony and soft tissue detail.  Improved alignment of ankle fracture and improved alignment of the ankle joint.  Suspected trimalleolar ankle fracture now demonstrates anatomic alignment.  No new acute displaced fracture.  No new dislocation.  Soft tissue edema about the ankle.  No unexpected radiopaque foreign body.                                       X-Ray Tibia Fibula 2 View Left (Final result)  Result time 06/21/25 18:15:43      Final result by Benito Waldron MD (06/21/25 18:15:43)                   Impression:      1. Distal fibular fracture noting tibiotalar dislocation.  Reimaging following closed reduction is recommended for more detailed evaluation of the distal tibia to definitively exclude fracture.      Electronically signed by: Benito Waldron MD  Date:    06/21/2025  Time:    18:15               Narrative:    EXAMINATION:  XR TIBIA FIBULA 2 VIEW LEFT; XR ANKLE COMPLETE 3 VIEW LEFT    CLINICAL HISTORY:  Unspecified fall, initial encounter    TECHNIQUE:  AP and lateral views of the left tibia and fibula were performed.  Three views left ankle.    COMPARISON:  None.    FINDINGS:  Four views left tibia fibula, three views left ankle.    There is avulsion fracture involving the distal aspect of the fibula noting displacement of the distal fracture fragment.  There is posterior dislocation of the tibiotalar articulation.  There is questionable fracture of the medial malleolus though not confirmed.  Reimaging is recommended following closed reduction to definitively exclude fracture.  There are degenerative changes of the knee.  There is  osteopenia.  There is edema about the lower leg and ankle.                                       X-Ray Foot Complete Left (Final result)  Result time 06/21/25 18:15:08      Final result by Benito Waldron MD (06/21/25 18:15:08)                   Impression:      1. There is fracture involving the proximal aspect of the 2nd metatarsal and possibly 3rd.  No significant overlying edema, correlation with any focal tenderness is recommended as this could reflect irregular healing of prior fracture.  Please see separately dictated report for details of the distal tibia and fibula.      Electronically signed by: Benito Waldron MD  Date:    06/21/2025  Time:    18:15               Narrative:    EXAMINATION:  XR FOOT COMPLETE 3 VIEW LEFT    CLINICAL HISTORY:  .  Unspecified fall, initial encounter    TECHNIQUE:  AP, lateral and oblique views of the left foot were performed.    COMPARISON:  None    FINDINGS:  Two views left foot.    No acute displaced fracture or dislocates cracks that there is osteopenia.  There are degenerative changes of the foot.  There is fracture involving the proximal aspect of the 2nd metatarsal and possibly 3rd metatarsal.  No significant edema along the dorsal aspect of the foot.  Please see separately dictated report for details of the ankle.                                       X-Ray Ankle Complete Left (Final result)  Result time 06/21/25 18:15:43      Final result by Benito Waldron MD (06/21/25 18:15:43)                   Impression:      1. Distal fibular fracture noting tibiotalar dislocation.  Reimaging following closed reduction is recommended for more detailed evaluation of the distal tibia to definitively exclude fracture.      Electronically signed by: Benito Waldron MD  Date:    06/21/2025  Time:    18:15               Narrative:    EXAMINATION:  XR TIBIA FIBULA 2 VIEW LEFT; XR ANKLE COMPLETE 3 VIEW LEFT    CLINICAL HISTORY:  Unspecified fall, initial  encounter    TECHNIQUE:  AP and lateral views of the left tibia and fibula were performed.  Three views left ankle.    COMPARISON:  None.    FINDINGS:  Four views left tibia fibula, three views left ankle.    There is avulsion fracture involving the distal aspect of the fibula noting displacement of the distal fracture fragment.  There is posterior dislocation of the tibiotalar articulation.  There is questionable fracture of the medial malleolus though not confirmed.  Reimaging is recommended following closed reduction to definitively exclude fracture.  There are degenerative changes of the knee.  There is osteopenia.  There is edema about the lower leg and ankle.                                          Assessment & Plan  Closed displaced trimalleolar fracture of left ankle  Pt is a 85 y.o. female who presents after a fall at home after missing a step resulting in a left trimalleolar ankle fracture .    -Initial x-ray ankle reveals an avulsion fracture involving the distal aspect of the fibula noting displacement of the distal fracture fragment. There is posterior dislocation of the tibiotalar articulation. There is questionable fracture of the medial malleolus though not confirmed.   -Ortho consulted in the ED and able to doppler left DP pulse and performed L ankle reduction w/ splinting  -Post reduction x-ray ankle reveals interval placement of overlying cast material, which limits fine bony and soft tissue detail. Improved alignment of ankle fracture and improved alignment of the ankle joint. Suspected trimalleolar ankle fracture now demonstrates anatomic alignment.   -CT Left Ankle :Trimalleolar ankle fracture with near anatomic alignment.  -MM pain management  -Ice and keep ankle elevated  -NWB LLE  -Per ortho DVT ppx: ASA 81mg bid x4 weeks   -PT/OT recs pending, suspect will need SNF  -ortho at bedside 6/22 reports plan is for surgery in the next week once swelling better and suspect could potentially need  to stay in house for this but will see how therapy goes. If needs SNF potentially O-SNF if able to return for same day surgery.  -Q4 Neurovascular Checks  -Fall Precautions  Hypokalemia  Patient's most recent potassium results are listed below.   Recent Labs     06/21/25  1703 06/22/25  0344   K 3.1* 3.5     Plan  - Replete potassium per protocol  - Monitor potassium Daily  - Patient's hypokalemia is stable  Hypertension  Patients blood pressure range in the last 24 hours was: BP  Min: 90/49  Max: 190/81.The patient's inpatient anti-hypertensive regimen is listed below:  Current Antihypertensives       Plan  - hypotensive 6/22 so BP meds held and bolused IVF  Hypothyroidism  Patient has chronic hypothyroidism. TFTs reviewed-   Lab Results   Component Value Date    TSH 2.778 01/21/2023     -Will continue chronic levothyroxine and adjust for and clinical changes.  Primary osteoarthritis of left knee  -History noted.  -Pt on celebrex and PRN voltaren and skelaxin at home.  -Pain management as above.  Hyperlipidemia   Patient is chronically on statin.will continue for now. Monitor clinically. Last LDL was   Lab Results   Component Value Date    LDLCALC 94 12/16/2020      Stage 3 chronic kidney disease  No previous diagnosis in chart but GFR appears to have been 40-50 since 2020.  Creatine stable for now. BMP reviewed- noted Estimated Creatinine Clearance: 28.5 mL/min (based on SCr of 1.4 mg/dL). according to latest data. Based on current GFR, CKD stage is stage 3 - GFR 30-59.  Monitor UOP and serial BMP and adjust therapy as needed. Renally dose meds. Avoid nephrotoxic medications and procedures.  Migraines  -History noted. No acute issues.  -PRN sumitriptan.  Anxiety and depression  Patient has recurrent depression which is unknown and is currently controlled. Will Continue anti-depressant medications. We will not consult psychiatry at this time. Patient does not display psychosis at this time. Continue to monitor  closely and adjust plan of care as needed.  -Continue lexapro.  Multiple closed fractures of metatarsal bone of left foot  -2nd metatarsal fx seen and questionable 3rd MT fx but CT showing appears to likely be only 2nd MT    Hypercalcemia  Hypercalcemia is likely due to likely volume depletion on admit and improved after IVF. The patient has the following symptoms due to their hypercalcemia: None (asymptomatic). Their most recent calcium and albumin results are listed below.  Recent Labs     06/21/25  1703 06/22/25  0344   CALCIUM 10.6* 8.9   ALBUMIN 3.8 3.0*     Plan  - Obtain the following labs to work up the cause of hypercalcemia: none.   - Treat the hypercalcemia with: IVF bolus.   - The patient's hypercalcemia is resolved.     Osteopenia  -seen on imaging and associated with current fracture    VTE Risk Mitigation (From admission, onward)           Ordered     IP VTE HIGH RISK PATIENT  Once         06/21/25 2305     Place sequential compression device  Until discontinued         06/21/25 2305                    Discharge Planning   MANINDER: 6/23/2025     Code Status: Full Code   Medical Readiness for Discharge Date:   Discharge Plan A: Home with family                        Lisa Bloom MD  Department of Hospital Medicine   [facility] - Surgery

## 2025-06-22 NOTE — ED NOTES
Assumed care of patient at this time. Pt arrive to ED with a complaint of fall,deformity noted to left ankle.Pt denies any LOC.Pt placed in hospital gown and currently lying in stretcher. Vital signs are stable, provided pt with warm blanket. Pt denied restroom use. No other complaints from pt at this time.     Review of patient's allergies indicates:   Allergen Reactions    Boniva [ibandronate] Other (See Comments)     SHOULDER PAIN    Demerol [meperidine]     Ramipril Swelling     Past Medical History:   Diagnosis Date    Hypertension     Thyroid disease

## 2025-06-22 NOTE — CONSULTS
Consult Note  Orthopedic Surgery    CC: left ankle injury     SUBJECTIVE:     History of Present Illness:  Caity Ashby is a 85 y.o. female w/PMH of HTN and hypothyroidism, presenting with left ankle pain. She reportedly tripped while going downstairs at [employer].  Denies hitting head or LOC.  Also denies any numbness/tingling or any other musculoskeletal pains.  No previous injuries or surgeries to the affected ankle.   Patient lives at home with her  (12 steps to home) and ambulates without assistance at baseline.  Does not smoke and is not on any blood thinners.      Review of patient's allergies indicates:   Allergen Reactions    Boniva [ibandronate] Other (See Comments)     SHOULDER PAIN    Demerol [meperidine]     Ramipril Swelling       Past Medical History:   Diagnosis Date    Hypertension     Thyroid disease      Past Surgical History:   Procedure Laterality Date    COSMETIC SURGERY      HYSTERECTOMY       No family history on file.  Social History[1]     Review of Systems:  Constitutional: Negative for fevers and chills  HENT: Negative for congestion and headaches   Eyes: Negative for blurred vision   Cardiovascular: Negative for chest pain and palpitations  Respiratory: Negative for cough and shortness of breath   Hematologic/Lymphatic: Negative for bleeding problem. Does not bruise/bleed easily  Skin: Negative for dry skin and rash  Musculoskeletal: Positive for wrist pain; negative for back pain  Gastrointestinal: Negative for abdominal pain and n/v/d  Neurological: Negative for numbness and paresthesias     OBJECTIVE:     Vital Signs:  Temp:  [98 °F (36.7 °C)-98.5 °F (36.9 °C)] 98 °F (36.7 °C)  Pulse:  [65-82] 68  Resp:  [16-22] 20  SpO2:  [97 %-100 %] 98 %  BP: (124-190)/(60-98) 154/72    Physical Exam:  General:  no apparent distress, WDWN  HENT:  NCAT, Bilateral ears/eyes normal  CV:  Normal pulses, color, and cap refill  Lungs:  Normal respiratory effort  Neuro: No FND, awake,  alert  Psych:  Oriented to Person, Place, Time, and Situation    MSK:       BUE:  Inspection: Skin intact throughout, no swelling, no effusions, no ecchymosis   Palpation: Non-TTP throughout, no palpable abnormality.   ROM: AROM and PROM of the shoulder, elbow, wrist, and hand intact without pain.   Neuro: AIN/PIN/Radial/Median/Ulnar Nerves assessed in isolation without deficit.   SILT throughout.    Vascular: Radial artery palpated 2+. Capillary refill <3s.         RLE:  Inspection: Skin intact throughout, no swelling, no effusions, no ecchymosis   Palpation: Non-TTP throughout. No palpable abnormality.   ROM: AROM and PROM of the hip, knee, ankle, and foot intact without pain.   Neuro: TA/EHL/Gastroc/FHL assessed in isolation without deficit.   SILT throughout.    Vascular: Foot is WWP. Capillary refill <3s.         LLE:  Inspection: Gross deformity present with skin tenting at the medial ankle   Skin intact throughout with light ecchymotic formations at medial ankle (see photo)  Global swelling present at ankle   Palpation: TTP at medial and lateral malleoli; otherwise non-TTP throughout.  Compartments soft and easily compressible   ROM: AROM and PROM of the hip, knee, foot intact without pain.  Ankle ROM assessment deferred 2/2 known fracture   Neuro: Fires TA/Gastroc/EHL/FHL   SILT Sa/[place]/DP/SP/T nerve distributions   Vascular: DP artery palpated 1+. Capillary refill <3s.              Diagnostic Results:  X-rays of the left ankle showing a posterolateral ankle dislocation with a comminuted ndiaye b distal fibula fracture; Post-reduction xrays showing acceptable alignment and comminuted posterior malleolus fracture involving <20 of the articular surface    Procedure Note Left ankle reduction under conscious sedation  All risks, benefits, and alternatives to treatment explained to patient. Verbalized consent to proceed was given by patient. Time out was performed and patient name, , site, and procedure were  confirmed. Patient was sedated by ER staff physician. The dislocated left ankle was reduced under fluoroscopy. Short leg splint was applied in typical fashion. Post-reduction films were performed and confirmed adequate reduction. Patient tolerated procedure well. No complications from sedation were encountered by the ED staff physician during the procedure.      ASSESSMENT/PLAN:     Caity Ashby is a 85 y.o. female with a left trimalleolar ankle fracture.  Presented closed, neurovascularly intact, and without any other musculoskeletal injuries on physical exam.  Reduced and placed in short leg splint w/stirrups in ER (see procedure note above), and CT was ordered for operative planning.      Plan:   - NWB to the LLE  - DVT ppx: ASA 81mg bid x4 weeks  - Patient advised to continuously ice and elevate ankle   - Admitted to [facility] for social work eval given limited support at home     » Dispo: Followup CT for operative planning; discussed with       Signed:  ABEL Sparrow M.D.   Orthopaedic Surgery, PGY3  06/21/2025         [1]   Social History  Tobacco Use    Smoking status: Never    Smokeless tobacco: Never   Substance Use Topics    Alcohol use: Not Currently    Drug use: Never

## 2025-06-22 NOTE — ASSESSMENT & PLAN NOTE
No previous diagnosis in chart but GFR appears to have been 40-50 since 2020.  Creatine stable for now. BMP reviewed- noted Estimated Creatinine Clearance: 28.5 mL/min (based on SCr of 1.4 mg/dL). according to latest data. Based on current GFR, CKD stage is stage 3 - GFR 30-59.  Monitor UOP and serial BMP and adjust therapy as needed. Renally dose meds. Avoid nephrotoxic medications and procedures.

## 2025-06-22 NOTE — ASSESSMENT & PLAN NOTE
Patients blood pressure range in the last 24 hours was: BP  Min: 124/98  Max: 190/81.The patient's inpatient anti-hypertensive regimen is listed below:  Current Antihypertensives  hydroCHLOROthiazide tablet 12.5 mg, Daily, Oral  losartan tablet 100 mg, Daily, Oral    Plan  - BP is controlled, no changes needed to their regimen

## 2025-06-22 NOTE — ASSESSMENT & PLAN NOTE
No previous diagnosis in chart but GFR appears to have been 40-50 since 2020.  Creatine stable for now. BMP reviewed- noted Estimated Creatinine Clearance: 30.7 mL/min (based on SCr of 1.3 mg/dL). according to latest data. Based on current GFR, CKD stage is stage 3 - GFR 30-59.  Monitor UOP and serial BMP and adjust therapy as needed. Renally dose meds. Avoid nephrotoxic medications and procedures.

## 2025-06-23 PROBLEM — D53.9 MACROCYTIC ANEMIA: Status: ACTIVE | Noted: 2025-06-23

## 2025-06-23 LAB
ABSOLUTE EOSINOPHIL (OHS): 0.68 K/UL
ABSOLUTE MONOCYTE (OHS): 0.94 K/UL (ref 0.3–1)
ABSOLUTE NEUTROPHIL COUNT (OHS): 4.62 K/UL (ref 1.8–7.7)
ALBUMIN SERPL BCP-MCNC: 2.8 G/DL (ref 3.5–5.2)
ALP SERPL-CCNC: 49 UNIT/L (ref 40–150)
ALT SERPL W/O P-5'-P-CCNC: 8 UNIT/L (ref 10–44)
ANION GAP (OHS): 9 MMOL/L (ref 8–16)
AST SERPL-CCNC: 23 UNIT/L (ref 11–45)
BASOPHILS # BLD AUTO: 0.05 K/UL
BASOPHILS NFR BLD AUTO: 0.6 %
BILIRUB SERPL-MCNC: 0.2 MG/DL (ref 0.1–1)
BUN SERPL-MCNC: 28 MG/DL (ref 8–23)
CALCIUM SERPL-MCNC: 8 MG/DL (ref 8.7–10.5)
CHLORIDE SERPL-SCNC: 101 MMOL/L (ref 95–110)
CO2 SERPL-SCNC: 25 MMOL/L (ref 23–29)
CREAT SERPL-MCNC: 1.2 MG/DL (ref 0.5–1.4)
ERYTHROCYTE [DISTWIDTH] IN BLOOD BY AUTOMATED COUNT: 12.1 % (ref 11.5–14.5)
GFR SERPLBLD CREATININE-BSD FMLA CKD-EPI: 45 ML/MIN/1.73/M2
GLUCOSE SERPL-MCNC: 88 MG/DL (ref 70–110)
HCT VFR BLD AUTO: 32.2 % (ref 37–48.5)
HGB BLD-MCNC: 10.8 GM/DL (ref 12–16)
IMM GRANULOCYTES # BLD AUTO: 0.02 K/UL (ref 0–0.04)
IMM GRANULOCYTES NFR BLD AUTO: 0.2 % (ref 0–0.5)
LYMPHOCYTES # BLD AUTO: 2.08 K/UL (ref 1–4.8)
MAGNESIUM SERPL-MCNC: 1.8 MG/DL (ref 1.6–2.6)
MCH RBC QN AUTO: 33.9 PG (ref 27–31)
MCHC RBC AUTO-ENTMCNC: 33.5 G/DL (ref 32–36)
MCV RBC AUTO: 101 FL (ref 82–98)
NUCLEATED RBC (/100WBC) (OHS): 0 /100 WBC
PLATELET # BLD AUTO: 205 K/UL (ref 150–450)
PMV BLD AUTO: 10.9 FL (ref 9.2–12.9)
POTASSIUM SERPL-SCNC: 3.8 MMOL/L (ref 3.5–5.1)
PROT SERPL-MCNC: 5.6 GM/DL (ref 6–8.4)
RBC # BLD AUTO: 3.19 M/UL (ref 4–5.4)
RELATIVE EOSINOPHIL (OHS): 8.1 %
RELATIVE LYMPHOCYTE (OHS): 24.8 % (ref 18–48)
RELATIVE MONOCYTE (OHS): 11.2 % (ref 4–15)
RELATIVE NEUTROPHIL (OHS): 55.1 % (ref 38–73)
SODIUM SERPL-SCNC: 135 MMOL/L (ref 136–145)
WBC # BLD AUTO: 8.39 K/UL (ref 3.9–12.7)

## 2025-06-23 PROCEDURE — 63600175 PHARM REV CODE 636 W HCPCS: Performed by: NURSE PRACTITIONER

## 2025-06-23 PROCEDURE — 99223 1ST HOSP IP/OBS HIGH 75: CPT | Mod: ,,, | Performed by: ORTHOPAEDIC SURGERY

## 2025-06-23 PROCEDURE — 83735 ASSAY OF MAGNESIUM: CPT | Performed by: HOSPITALIST

## 2025-06-23 PROCEDURE — 25000003 PHARM REV CODE 250: Performed by: HOSPITALIST

## 2025-06-23 PROCEDURE — 82040 ASSAY OF SERUM ALBUMIN: CPT | Performed by: HOSPITALIST

## 2025-06-23 PROCEDURE — 36415 COLL VENOUS BLD VENIPUNCTURE: CPT | Performed by: HOSPITALIST

## 2025-06-23 PROCEDURE — 85025 COMPLETE CBC W/AUTO DIFF WBC: CPT | Performed by: HOSPITALIST

## 2025-06-23 PROCEDURE — G0378 HOSPITAL OBSERVATION PER HR: HCPCS

## 2025-06-23 PROCEDURE — 25000003 PHARM REV CODE 250: Performed by: NURSE PRACTITIONER

## 2025-06-23 RX ADMIN — POLYETHYLENE GLYCOL 3350 17 G: 17 POWDER, FOR SOLUTION ORAL at 08:06

## 2025-06-23 RX ADMIN — METHOCARBAMOL 500 MG: 500 TABLET ORAL at 08:06

## 2025-06-23 RX ADMIN — SENNOSIDES AND DOCUSATE SODIUM 1 TABLET: 50; 8.6 TABLET ORAL at 08:06

## 2025-06-23 RX ADMIN — LEVOTHYROXINE SODIUM 125 MCG: 0.12 TABLET ORAL at 05:06

## 2025-06-23 RX ADMIN — METHOCARBAMOL 500 MG: 500 TABLET ORAL at 03:06

## 2025-06-23 RX ADMIN — ONDANSETRON 4 MG: 2 INJECTION INTRAMUSCULAR; INTRAVENOUS at 11:06

## 2025-06-23 RX ADMIN — ASPIRIN 81 MG: 81 TABLET, COATED ORAL at 08:06

## 2025-06-23 RX ADMIN — ACETAMINOPHEN 1000 MG: 500 TABLET ORAL at 10:06

## 2025-06-23 RX ADMIN — PREGABALIN 50 MG: 50 CAPSULE ORAL at 08:06

## 2025-06-23 RX ADMIN — ACETAMINOPHEN 1000 MG: 500 TABLET ORAL at 01:06

## 2025-06-23 RX ADMIN — ESCITALOPRAM OXALATE 10 MG: 10 TABLET ORAL at 08:06

## 2025-06-23 RX ADMIN — Medication 6 MG: at 08:06

## 2025-06-23 RX ADMIN — ATORVASTATIN CALCIUM 20 MG: 20 TABLET, FILM COATED ORAL at 08:06

## 2025-06-23 RX ADMIN — ACETAMINOPHEN 1000 MG: 500 TABLET ORAL at 05:06

## 2025-06-23 NOTE — PROGRESS NOTES
[facility] - [facility]  Orthopedics  Progress Note    Patient Name: Caity Ashby  MRN: [id number]  Admission Date: 6/21/2025  Hospital Length of Stay: 0 days  Attending Provider: Lisa Bloom MD  Primary Care Provider: Erasmo Jones MD    Subjective:     Principal Problem:Closed displaced trimalleolar fracture of left ankle    Principal Orthopedic Problem:  As above    Interval History:  Patient is seen and examined at the bedside this morning.  She is lying in bed with eye mask in place.  She never takes the eye mask off during my encounter.  She does report that her pain is well-controlled at rest.  Of the left lower extremity is elevated on a stack of blankets were short-leg splint in place.  Her , who is at the bedside, states that he thinks that his wife is weak and is hesitant for surgery though he understands the necessity for operative fixation.    Review of patient's allergies indicates:   Allergen Reactions    Boniva [ibandronate] Other (See Comments)     SHOULDER PAIN    Demerol [meperidine]     Ramipril Swelling       Current Facility-Administered Medications   Medication    acetaminophen tablet 1,000 mg    aspirin EC tablet 81 mg    atorvastatin tablet 20 mg    dextrose 50% injection 12.5 g    dextrose 50% injection 25 g    EScitalopram oxalate tablet 10 mg    glucagon (human recombinant) injection 1 mg    glucose chewable tablet 16 g    glucose chewable tablet 24 g    levothyroxine tablet 125 mcg    melatonin tablet 6 mg    methocarbamoL tablet 500 mg    morphine injection 2 mg    naloxone 0.4 mg/mL injection 0.02 mg    ondansetron injection 4 mg    oxyCODONE immediate release tablet 5 mg    oxyCODONE immediate release tablet Tab 10 mg    polyethylene glycol packet 17 g    pregabalin capsule 50 mg    senna-docusate 8.6-50 mg per tablet 1 tablet    sodium chloride 0.9% flush 10 mL    sumatriptan tablet 100 mg     Objective:     Vital Signs (Most Recent):  Temp: 97.4 °F (36.3 °C)  "(06/23/25 0700)  Pulse: (!) 56 (06/23/25 0700)  Resp: 16 (06/23/25 0700)  BP: (!) 117/58 (06/23/25 0700)  SpO2: 97 % (06/23/25 0700) Vital Signs (24h Range):  Temp:  [96.6 °F (35.9 °C)-98.5 °F (36.9 °C)] 97.4 °F (36.3 °C)  Pulse:  [56-69] 56  Resp:  [16-18] 16  SpO2:  [95 %-97 %] 97 %  BP: ()/(53-58) 117/58     Weight: 60.8 kg (133 lb 15.9 oz)  Height: 5' 6" (167.6 cm)  Body mass index is 21.63 kg/m².      Intake/Output Summary (Last 24 hours) at 6/23/2025 1002  Last data filed at 6/23/2025 0803  Gross per 24 hour   Intake 300 ml   Output 1200 ml   Net -900 ml        Ortho/SPM Exam  NAD, resting comfortably in bed with I am asking place.  She never removed the eye mask or makes eye contact during my encounter.  Breathing comfortably w/o distress   Extremities WWP     LLE:   Short-leg splint in place.  The leg is elevated.    Splint windowed both anteriorly, and laterally.    Of the anterior aspect of the distal tibia, there are several small serous containing blisters.  The skin of the anterior ankle does wrinkle.    Skin intact of the lateral ankle.  She is tender to palpation along the lateral ankle but the skin does wrinkle here as well.    Sensation intact to light touch to all the toes.    EHL, FHL intact.  Wiggles all other toes.    Foot is warm and well-perfused     Significant Labs: All pertinent labs within the past 24 hours have been reviewed.    Significant Imaging: I have reviewed and interpreted all pertinent imaging results/findings.  Assessment/Plan:     * Closed displaced trimalleolar fracture of left ankle  Caity Ashby is a 85 y.o. female with a left trimalleolar ankle fracture.  Presented closed, neurovascularly intact, and without any other musculoskeletal injuries on physical exam.  Closed reduction was performed in the emergency department.  The patient was admitted given concern for her to safely remain nonweightbearing at home.    06/23/2025:  Patient with a left trimalleolar " ankle fracture dislocation who was closed reduced in the emergency department on 06/21/2025.  She was seen and examined at the bedside this morning.  The skin of the anterior ankle does wrinkle though she has several serous containing blisters of the anterior aspect of the distal tibia.  The skin also wrinkles along the lateral ankle where she is tender to palpation along the distal fibula.  She has consented for operative fixation of the left trimalleolar fracture dislocation; we will plan to recheck her swelling tomorrow and take her to the OR for definitive fixation once her swelling allows.     Plan:   - NWB to the LLE  - DVT ppx: ASA 81mg bid  - Aggressively ice and elevate the LLE on a stack of sheets at all times   - Multimodal pain control, limiting narcotics.  - NPO 2300 madan Forrester MD  Orthopedics  [facility] - [facility]

## 2025-06-23 NOTE — ASSESSMENT & PLAN NOTE
Patients blood pressure range in the last 24 hours was: BP  Min: 90/49  Max: 190/81.The patient's inpatient anti-hypertensive regimen is listed below:  Current Antihypertensives       Plan  - hypotensive 6/22 so BP meds held and bolused IVF and improving to 110s now.  going to work on intake

## 2025-06-23 NOTE — ASSESSMENT & PLAN NOTE
No previous diagnosis in chart but GFR appears to have been 40-50 since 2020.  Creatine stable for now. BMP reviewed- noted Estimated Creatinine Clearance: 33.3 mL/min (based on SCr of 1.2 mg/dL). according to latest data. Based on current GFR, CKD stage is stage 3 - GFR 30-59.  Monitor UOP and serial BMP and adjust therapy as needed. Renally dose meds. Avoid nephrotoxic medications and procedures.

## 2025-06-23 NOTE — ASSESSMENT & PLAN NOTE
Caity Ashby is a 85 y.o. female with a left trimalleolar ankle fracture.  Presented closed, neurovascularly intact, and without any other musculoskeletal injuries on physical exam.  Closed reduction was performed in the emergency department.  The patient was admitted given concern for her to safely remain nonweightbearing at home.    06/24/2025:  Patient is seen and examined at the bedside this morning.  Her splint is windowed; swelling is still present in her skin only barely wrinkles.  She has not yet amenable to definitive fixation.  She may have a diet today.  Surgery pending soft tissues.     Plan:   - NWB to the LLE  - DVT ppx: ASA 81mg bid  - Aggressively ice and elevate the LLE on a stack of sheets at all times   - Multimodal pain control, limiting narcotics.  - NPO midnight

## 2025-06-23 NOTE — PROGRESS NOTES
[facility] Medicine  Progress Note    Patient Name: Caity Ashby  MRN: [id number]  Patient Class: OP- Observation   Admission Date: 6/21/2025  Length of Stay: 0 days  Attending Physician: Lisa Bloom MD  Primary Care Provider: Erasmo Jones MD        Subjective     Principal Problem:Closed displaced trimalleolar fracture of left ankle        HPI:  Caity Ashby is a 85 y.o. female with a PMHx of HTN, HLD, arthritis, anxiety/depression, hypothyroidism, and migraines who presented to the ED for obvious LLE deformity after a fall. Patient states that she was walking down her stairs when she accidentally missed the bottom step causing her foot to go under her as she fell to the ground. Patient denies LOC or head trauma. She had immediate severe pain and a noticeable deformity to her left ankle. Denies numbness/tingling in her LLE. Denies sustaining any additional injuries. She reports prior to the fall she was in her normal state of health and denies CP, SOB, cough, fever/chills, abdominal pain, N/V/D, or urinary complaints.    In the ED, hypertensive initially otherwise VSS, afebrile. CBC unremarkable. Na 135. K 3.1. Bicarb 20. Anion gap 17. Cr 1.3 (bl~1.1). Calcium 10.6. Xray L foot with fracture involving the proximal aspect of the 2nd metatarsal and possibly 3rd. No significant overlying edema, correlation with any focal tenderness is recommended as this could reflect irregular healing of prior fracture. X-ray L ankle revealed an avulsion fracture involving the distal aspect of the fibula noting displacement of the distal fracture fragment. There is posterior dislocation of the tibiotalar articulation.There is questionable fracture of the medial malleolus though not confirmed. Post reduction x-ray ankle revealing interval placement of overlying cast material, which limits fine bony and soft tissue detail. Improved alignment of ankle fracture and improved alignment of the  ankle joint. Suspected trimalleolar ankle fracture now demonstrates anatomic alignment. CT L ankle pending. Orthopedics consulted in the ED and performed reduction of left ankle and short leg splint applied. Recommend DVT ppx with ASA bid and plan for operative fixation in the coming weeks.The patient received 2g Ancef, 4mg Zofran, 50mcg Fentanyl x2 doses.     Overview/Hospital Course:  No notes on file    Interval history- seen this morning with  at bedside. She didn't sleep that well last night as had frequent awakenings for labs, vitals, etc so was awake this morning with eyemask over eyes but was trying to get a little more rest in and talked to  mostly and she chimed in here and there as well. Discsused with him the plans as he had concerns about some things so clarified so he unerstaood it better as well. Ortho yesterday at bedside said plan was to surgerize her later in week once swelling goes down. Has hard splint on so unable to assess swelling given this so I told him they will likely have to either take it down or put a window in it to assess further at some point this week per their dsicretion to know when swelling is better. The plan is once that is done then will need plaecment as PT/OT rec high intensity therapy and DME with BSC, RW, shower chair. At home it would be too difficult to be athome he said or get her into house and so I clarified with him that the plan would be to go directly to [facility] from hospital when medically ready and so that made him feel better. I told him at this poitn it appears the plan would be to surgerize this week per what dr vega had indicated yetserady but in a few days once swelling better and then to go to post acute stay once that is completed and would not be going home in between or to facility I between given difficulties with faciliites taking and insurance issues to go and come back weve seen in past with smiilar cases. He asked about getting  a scooter knee roller and it old him that cannot get that as that is bearing weight throught he knee on that leg and she cannot bear weight on that leg per ortho and so he asid he feels like the walker is unsteady and so he will takl to PT about that more. Not ebaring weight with a walker will be difficult no matter what and so therapy may focus on transfesr more as until she is allowed to bear some weight or have boot post surgery in near future that it will be difficult given hopping will be the main thing she can do with the walker without bearing weight. BP imrpoved with boluses yesterday and holding home BP meds. She doesn't really like the food here so he's going to probably bring meds from [facility] to help boost her intake. Boost added as well for protein supplement.  Will f/u further ortho recs. Discussed plan with CM        Review of patient's allergies indicates:   Allergen Reactions    Boniva [ibandronate] Other (See Comments)     SHOULDER PAIN    Demerol [meperidine]     Ramipril Swelling       No current facility-administered medications on file prior to encounter.     Current Outpatient Medications on File Prior to Encounter   Medication Sig    diclofenac (VOLTAREN) 75 MG EC tablet Take 75 mg by mouth 2 (two) times daily.    escitalopram oxalate (LEXAPRO) 10 MG tablet Take 10 mg by mouth.    estrogens, conjugated, (PREMARIN) 0.625 MG tablet Take 0.625 mg by mouth once daily.    finasteride (PROPECIA) 1 mg tablet Take 1 mg by mouth.     levothyroxine (SYNTHROID) 125 MCG tablet TAKE 1 TABLET BY MOUTH EVERY MORNING    losartan (COZAAR) 100 MG tablet TAKE 1 TABLET BY MOUTH EVERY DAY    meloxicam (MOBIC) 15 MG tablet Take 15 mg by mouth once daily.    sumatriptan (IMITREX) 100 MG tablet     atorvastatin (LIPITOR) 20 MG tablet Take 20 mg by mouth every evening.    EPINEPHrine (EPIPEN) 0.3 mg/0.3 mL AtIn Inject 0.3 mLs (0.3 mg total) into the muscle once. for 1 dose    hydroCHLOROthiazide (HYDRODIURIL) 12.5 MG Tab  TAKE 1 TABLET BY MOUTH EVERY DAY    metaxalone (SKELAXIN) 800 MG tablet Take 800 mg by mouth 3 (three) times daily as needed for Pain.    valACYclovir (VALTREX) 500 MG tablet TAKE 1 TABLET BY MOUTH EVERY DAY     Family History    None       Tobacco Use    Smoking status: Never    Smokeless tobacco: Never   Substance and Sexual Activity    Alcohol use: Not Currently    Drug use: Never    Sexual activity: Yes     Partners: Male     Review of Systems   Constitutional:  Negative for appetite change, chills, fatigue and fever.   HENT:  Negative for congestion, rhinorrhea and sore throat.    Eyes:  Negative for photophobia and visual disturbance.   Respiratory:  Negative for cough, chest tightness and shortness of breath.    Cardiovascular:  Negative for chest pain and palpitations.   Gastrointestinal:  Negative for abdominal distention, abdominal pain, constipation, diarrhea, nausea and vomiting.   Genitourinary:  Negative for difficulty urinating, dysuria and frequency.   Musculoskeletal:  Positive for arthralgias, gait problem, joint swelling and myalgias. Negative for neck pain.   Skin:  Positive for color change.   Neurological:  Negative for dizziness, syncope, weakness, numbness and headaches.   Psychiatric/Behavioral:  Negative for confusion and hallucinations. The patient is not nervous/anxious.      Objective:     Vital Signs (Most Recent):  Temp: 97.4 °F (36.3 °C) (06/23/25 0700)  Pulse: (!) 56 (06/23/25 0700)  Resp: 16 (06/23/25 0700)  BP: (!) 117/58 (06/23/25 0700)  SpO2: 97 % (06/23/25 0700) Vital Signs (24h Range):  Temp:  [96.6 °F (35.9 °C)-98.5 °F (36.9 °C)] 97.4 °F (36.3 °C)  Pulse:  [56-69] 56  Resp:  [16-18] 16  SpO2:  [95 %-97 %] 97 %  BP: ()/(53-58) 117/58     Weight: 60.8 kg (133 lb 15.9 oz)  Body mass index is 21.63 kg/m².     Physical Exam  Vitals and nursing note reviewed.   Constitutional:       General: She is not in acute distress.     Appearance: Normal appearance. She is not  toxic-appearing or diaphoretic.      Comments:  at bedside   HENT:      Head: Normocephalic and atraumatic.      Mouth/Throat:      Mouth: Mucous membranes are moist.      Pharynx: Oropharynx is clear.   Eyes:      Extraocular Movements: Extraocular movements intact.      Pupils: Pupils are equal, round, and reactive to light.   Cardiovascular:      Rate and Rhythm: Normal rate and regular rhythm.      Heart sounds: Normal heart sounds.   Pulmonary:      Effort: Pulmonary effort is normal. No respiratory distress.      Breath sounds: Normal breath sounds. No wheezing.   Abdominal:      General: Abdomen is flat. There is no distension.      Palpations: Abdomen is soft.      Tenderness: There is no abdominal tenderness. There is no guarding.   Musculoskeletal:         General: Tenderness and signs of injury present.      Cervical back: Normal range of motion.      Left ankle: Tenderness present.      Comments: LLE with short leg splint, able to wiggle toes. Toes slightly cool with cap refill of 2-3 seconds   Skin:     General: Skin is warm and dry.      Capillary Refill: Capillary refill takes 2 to 3 seconds.   Neurological:      General: No focal deficit present.      Mental Status: She is alert and oriented to person, place, and time. Mental status is at baseline.   Psychiatric:         Mood and Affect: Mood normal.         Behavior: Behavior normal.         Thought Content: Thought content normal.         Judgment: Judgment normal.              CRANIAL NERVES     CN III, IV, VI   Pupils are equal, round, and reactive to light.       Significant Labs: All pertinent labs within the past 24 hours have been reviewed.  CBC:   Recent Labs   Lab 06/21/25  1703 06/22/25  0344 06/23/25  0320   WBC 9.66 12.32 8.39   HGB 14.0 11.4* 10.8*   HCT 40.6 34.0* 32.2*    250 205     CMP:   Recent Labs   Lab 06/21/25  1703 06/22/25  0344 06/23/25  0320   * 139 135*   K 3.1* 3.5 3.8   CL 98 101 101   CO2 20* 25 25     96 88   BUN 28* 28* 28*   CREATININE 1.3 1.4 1.2   CALCIUM 10.6* 8.9 8.0*   PROT 7.5 5.8* 5.6*   ALBUMIN 3.8 3.0* 2.8*   BILITOT 0.5 0.3 0.2   ALKPHOS 68 53 49   AST 30 26 23   ALT 17 13 8*   ANIONGAP 17* 13 9       Significant Imaging: I have reviewed all pertinent imaging results/findings within the past 24 hours.  Imaging Results               CT Ankle (Including Hindfoot) Without Contrast Left (Final result)  Result time 06/22/25 03:31:00      Final result by Alvarez Sharpe MD (06/22/25 03:31:00)                   Impression:      Trimalleolar ankle fracture with near anatomic alignment.    Remote or subacute nondisplaced fracture of the 2nd metatarsal.    This report was flagged in Epic as abnormal.    Electronically signed by resident: Tito Bernard  Date:    06/22/2025  Time:    02:24    Electronically signed by: Alvarez Sharpe MD  Date:    06/22/2025  Time:    03:31               Narrative:    EXAMINATION:  CT ANKLE (INCLUDING HINDFOOT) WITHOUT CONTRAST LEFT; CT 3D RENDERING ON AN INDEPENDENT WORKSTATION    CLINICAL HISTORY:  Fracture, ankle;; Fracture, ankle;fracture;    TECHNIQUE:  CT of the left ankle was performed without the administration of intravenous contrast. 3D reconstructed images were acquired by post processing for a better visualization of the anatomic structures, with concurrent supervision and archived for permanent records.    COMPARISON:  X-ray ankle 06/21/2025, x-ray foot 06/21/2025.    FINDINGS:  Again seen are acute mildly displaced comminuted fractures of the distal tibia and fibula.  Satisfactory alignment of the fracture fragments.  Remote or subacute appearing nondisplaced fracture of the proximal 2nd metatarsal.  Ankle joint effusion.  No other fractures or dislocation.  Generalized osteopenia.    There is extensive soft tissue stranding about the ankle.  Calcific atherosclerosis of the partially visualized lower extremity vessels.  Cast material overlies the  "lower extremity.                                       X-Ray Chest 1 View (Final result)  Result time 06/22/25 00:50:12      Final result by Alvarez Sharpe MD (06/22/25 00:50:12)                   Impression:      No acute finding identified on this limited single view.      Electronically signed by: Alvarez Sharpe MD  Date:    06/22/2025  Time:    00:50               Narrative:    EXAMINATION:  XR CHEST 1 VIEW    CLINICAL HISTORY:  Provided history is "pre op eval;  ".    TECHNIQUE:  One view of the chest.    COMPARISON:  11/28/2025.    FINDINGS:  Nonspecific prominent linear metallic radiodensities overlie the chest and mediastinum.  Cardiomediastinal silhouette is not enlarged.  There are coarse interstitial lung markings but no large focal area of consolidation.  No large pleural effusion.  No pneumothorax.                                       X-Ray Ankle Complete Left (Final result)  Result time 06/21/25 23:24:03      Final result by Alvarez Sharpe MD (06/21/25 23:24:03)                   Impression:      Near anatomic alignment of previously seen ankle fracture/dislocation.      Electronically signed by: Alvarez Sharpe MD  Date:    06/21/2025  Time:    23:24               Narrative:    EXAMINATION:  XR ANKLE COMPLETE 3 VIEW LEFT    CLINICAL HISTORY:  Unspecified fall, initial encounter    TECHNIQUE:  AP, lateral and oblique views of the left ankle were performed.    COMPARISON:  06/21/2025.    FINDINGS:  Interval placement of overlying cast material, which limits fine bony and soft tissue detail.  Improved alignment of ankle fracture and improved alignment of the ankle joint.  Suspected trimalleolar ankle fracture now demonstrates anatomic alignment.  No new acute displaced fracture.  No new dislocation.  Soft tissue edema about the ankle.  No unexpected radiopaque foreign body.                                       X-Ray Tibia Fibula 2 View Left (Final result)  Result time 06/21/25 18:15:43      " Final result by Benito Waldron MD (06/21/25 18:15:43)                   Impression:      1. Distal fibular fracture noting tibiotalar dislocation.  Reimaging following closed reduction is recommended for more detailed evaluation of the distal tibia to definitively exclude fracture.      Electronically signed by: Benito Waldron MD  Date:    06/21/2025  Time:    18:15               Narrative:    EXAMINATION:  XR TIBIA FIBULA 2 VIEW LEFT; XR ANKLE COMPLETE 3 VIEW LEFT    CLINICAL HISTORY:  Unspecified fall, initial encounter    TECHNIQUE:  AP and lateral views of the left tibia and fibula were performed.  Three views left ankle.    COMPARISON:  None.    FINDINGS:  Four views left tibia fibula, three views left ankle.    There is avulsion fracture involving the distal aspect of the fibula noting displacement of the distal fracture fragment.  There is posterior dislocation of the tibiotalar articulation.  There is questionable fracture of the medial malleolus though not confirmed.  Reimaging is recommended following closed reduction to definitively exclude fracture.  There are degenerative changes of the knee.  There is osteopenia.  There is edema about the lower leg and ankle.                                       X-Ray Foot Complete Left (Final result)  Result time 06/21/25 18:15:08      Final result by Benito Waldron MD (06/21/25 18:15:08)                   Impression:      1. There is fracture involving the proximal aspect of the 2nd metatarsal and possibly 3rd.  No significant overlying edema, correlation with any focal tenderness is recommended as this could reflect irregular healing of prior fracture.  Please see separately dictated report for details of the distal tibia and fibula.      Electronically signed by: Benito Waldron MD  Date:    06/21/2025  Time:    18:15               Narrative:    EXAMINATION:  XR FOOT COMPLETE 3 VIEW LEFT    CLINICAL HISTORY:  .  Unspecified fall, initial  encounter    TECHNIQUE:  AP, lateral and oblique views of the left foot were performed.    COMPARISON:  None    FINDINGS:  Two views left foot.    No acute displaced fracture or dislocates cracks that there is osteopenia.  There are degenerative changes of the foot.  There is fracture involving the proximal aspect of the 2nd metatarsal and possibly 3rd metatarsal.  No significant edema along the dorsal aspect of the foot.  Please see separately dictated report for details of the ankle.                                       X-Ray Ankle Complete Left (Final result)  Result time 06/21/25 18:15:43      Final result by Benito Waldron MD (06/21/25 18:15:43)                   Impression:      1. Distal fibular fracture noting tibiotalar dislocation.  Reimaging following closed reduction is recommended for more detailed evaluation of the distal tibia to definitively exclude fracture.      Electronically signed by: Benito Waldron MD  Date:    06/21/2025  Time:    18:15               Narrative:    EXAMINATION:  XR TIBIA FIBULA 2 VIEW LEFT; XR ANKLE COMPLETE 3 VIEW LEFT    CLINICAL HISTORY:  Unspecified fall, initial encounter    TECHNIQUE:  AP and lateral views of the left tibia and fibula were performed.  Three views left ankle.    COMPARISON:  None.    FINDINGS:  Four views left tibia fibula, three views left ankle.    There is avulsion fracture involving the distal aspect of the fibula noting displacement of the distal fracture fragment.  There is posterior dislocation of the tibiotalar articulation.  There is questionable fracture of the medial malleolus though not confirmed.  Reimaging is recommended following closed reduction to definitively exclude fracture.  There are degenerative changes of the knee.  There is osteopenia.  There is edema about the lower leg and ankle.                                          Assessment & Plan  Closed displaced trimalleolar fracture of left ankle  Pt is a 85 y.o. female who  presents after a fall at home after missing a step resulting in a left trimalleolar ankle fracture .    -Initial x-ray ankle reveals an avulsion fracture involving the distal aspect of the fibula noting displacement of the distal fracture fragment. There is posterior dislocation of the tibiotalar articulation. There is questionable fracture of the medial malleolus though not confirmed.   -Ortho consulted in the ED and able to doppler left DP pulse and performed L ankle reduction w/ splinting  -Post reduction x-ray ankle reveals interval placement of overlying cast material, which limits fine bony and soft tissue detail. Improved alignment of ankle fracture and improved alignment of the ankle joint. Suspected trimalleolar ankle fracture now demonstrates anatomic alignment.   -CT Left Ankle :Trimalleolar ankle fracture with near anatomic alignment.  -MM pain management  -Ice and keep ankle elevated. Has hard splint so at some point will either need a window or to take down per ortho discretion to reassess swelling.  -NWB LLE  -Per ortho DVT ppx: ASA 81mg bid x4 weeks   -PT/OT recs for high intensity therapy, RW/ BSC, schower chair  -ortho at bedside 6/22 reports plan is for surgery in the next week once swelling better and at this point plan it to wait for OR in house and then pursue post acute stay after unless ortho plans change. Discussed with  and CM today  -Q4 Neurovascular Checks  -Fall Precautions  Hypokalemia  Patient's most recent potassium results are listed below.   Recent Labs     06/21/25  1703 06/22/25  0344 06/23/25  0320   K 3.1* 3.5 3.8     Plan  - Replete potassium per protocol  - Monitor potassium Daily  - Patient's hypokalemia is stable  Hypertension  Patients blood pressure range in the last 24 hours was: BP  Min: 90/49  Max: 190/81.The patient's inpatient anti-hypertensive regimen is listed below:  Current Antihypertensives       Plan  - hypotensive 6/22 so BP meds held and bolused IVF and  improving to 110s now.  going to work on intake   Hypothyroidism  Patient has chronic hypothyroidism. TFTs reviewed-   Lab Results   Component Value Date    TSH 2.778 01/21/2023     -Will continue chronic levothyroxine and adjust for and clinical changes.  Primary osteoarthritis of left knee  -History noted.  -Pt on celebrex and PRN voltaren and skelaxin at home.  -Pain management as above.  Hyperlipidemia   Patient is chronically on statin.will continue for now. Monitor clinically. Last LDL was   Lab Results   Component Value Date    LDLCALC 94 12/16/2020      Stage 3 chronic kidney disease  No previous diagnosis in chart but GFR appears to have been 40-50 since 2020.  Creatine stable for now. BMP reviewed- noted Estimated Creatinine Clearance: 33.3 mL/min (based on SCr of 1.2 mg/dL). according to latest data. Based on current GFR, CKD stage is stage 3 - GFR 30-59.  Monitor UOP and serial BMP and adjust therapy as needed. Renally dose meds. Avoid nephrotoxic medications and procedures.  Migraines  -History noted. No acute issues.  -PRN sumitriptan.  Anxiety and depression  Patient has recurrent depression which is unknown and is currently controlled. Will Continue anti-depressant medications. We will not consult psychiatry at this time. Patient does not display psychosis at this time. Continue to monitor closely and adjust plan of care as needed.  -Continue lexapro.  Multiple closed fractures of metatarsal bone of left foot  -2nd metatarsal fx seen and questionable 3rd MT fx but CT showing appears to likely be only 2nd MT    Hypercalcemia  Hypercalcemia is likely due to likely volume depletion on admit and improved after IVF. The patient has the following symptoms due to their hypercalcemia: None (asymptomatic). Their most recent calcium and albumin results are listed below.  Recent Labs     06/21/25  1703 06/22/25  0344 06/23/25  0320   CALCIUM 10.6* 8.9 8.0*   ALBUMIN 3.8 3.0* 2.8*     Plan  - Obtain the  following labs to work up the cause of hypercalcemia: none.   - Treat the hypercalcemia with: IVF bolus.   - The patient's hypercalcemia is resolved.     Osteopenia  -seen on imaging and associated with current fracture    Macrocytic anemia  Anemia is likely due to unclear, check b12/folate/iron. Most recent hemoglobin and hematocrit are listed below.  Recent Labs     06/21/25  1703 06/22/25  0344 06/23/25  0320   HGB 14.0 11.4* 10.8*   HCT 40.6 34.0* 32.2*     Plan  - Monitor serial CBC: Daily  - Transfuse PRBC if patient becomes hemodynamically unstable, symptomatic or H/H drops below 7/21.  - Patient has not received any PRBC transfusions to date  - Patient's anemia is currently stable    VTE Risk Mitigation (From admission, onward)           Ordered     IP VTE HIGH RISK PATIENT  Once         06/21/25 2305     Place sequential compression device  Until discontinued         06/21/25 2305                    Discharge Planning   MANINDER: 6/27/2025     Code Status: Full Code   Medical Readiness for Discharge Date:   Discharge Plan A: Home with family                        Lisa Bloom MD  Department of Hospital Medicine   [facility]

## 2025-06-23 NOTE — ASSESSMENT & PLAN NOTE
Pt is a 85 y.o. female who presents after a fall at home after missing a step resulting in a left trimalleolar ankle fracture .    -Initial x-ray ankle reveals an avulsion fracture involving the distal aspect of the fibula noting displacement of the distal fracture fragment. There is posterior dislocation of the tibiotalar articulation. There is questionable fracture of the medial malleolus though not confirmed.   -Ortho consulted in the ED and able to doppler left DP pulse and performed L ankle reduction w/ splinting  -Post reduction x-ray ankle reveals interval placement of overlying cast material, which limits fine bony and soft tissue detail. Improved alignment of ankle fracture and improved alignment of the ankle joint. Suspected trimalleolar ankle fracture now demonstrates anatomic alignment.   -CT Left Ankle :Trimalleolar ankle fracture with near anatomic alignment.  -MM pain management  -Ice and keep ankle elevated. Has hard splint so at some point will either need a window or to take down per ortho discretion to reassess swelling.  -NWB LLE  -Per ortho DVT ppx: ASA 81mg bid x4 weeks   -PT/OT recs for high intensity therapy, RW/ BSC, schower chair  -ortho at bedside 6/22 reports plan is for surgery in the next week once swelling better and at this point plan it to wait for OR in house and then pursue post acute stay after unless ortho plans change. Discussed with  and CM today  -Q4 Neurovascular Checks  -Fall Precautions

## 2025-06-23 NOTE — ASSESSMENT & PLAN NOTE
Patient's most recent potassium results are listed below.   Recent Labs     06/21/25  1703 06/22/25  0344 06/23/25  0320   K 3.1* 3.5 3.8     Plan  - Replete potassium per protocol  - Monitor potassium Daily  - Patient's hypokalemia is stable

## 2025-06-23 NOTE — ASSESSMENT & PLAN NOTE
Anemia is likely due to unclear, check b12/folate/iron. Most recent hemoglobin and hematocrit are listed below.  Recent Labs     06/21/25  1703 06/22/25  0344 06/23/25  0320   HGB 14.0 11.4* 10.8*   HCT 40.6 34.0* 32.2*     Plan  - Monitor serial CBC: Daily  - Transfuse PRBC if patient becomes hemodynamically unstable, symptomatic or H/H drops below 7/21.  - Patient has not received any PRBC transfusions to date  - Patient's anemia is currently stable

## 2025-06-23 NOTE — SUBJECTIVE & OBJECTIVE
Interval history- seen this morning with  at bedside. She didn't sleep that well last night as had frequent awakenings for labs, vitals, etc so was awake this morning with eyemask over eyes but was trying to get a little more rest in and talked to  mostly and she chimed in here and there as well. Discsused with him the plans as he had concerns about some things so clarified so he unerstaood it better as well. Ortho yesterday at bedside said plan was to surgerize her later in week once swelling goes down. Has hard splint on so unable to assess swelling given this so I told him they will likely have to either take it down or put a window in it to assess further at some point this week per their dsicretion to know when swelling is better. The plan is once that is done then will need plaecment as PT/OT rec high intensity therapy and DME with BSC, RW, shower chair. At home it would be too difficult to be athome he said or get her into house and so I clarified with him that the plan would be to go directly to [facility] from hospital when medically ready and so that made him feel better. I told him at this poitn it appears the plan would be to surgerize this week per what dr vega had indicated yetserady but in a few days once swelling better and then to go to post acute stay once that is completed and would not be going home in between or to facility I between given difficulties with faciliites taking and insurance issues to go and come back weve seen in past with smiilar cases. He asked about getting a scooter knee roller and it old him that cannot get that as that is bearing weight throught he knee on that leg and she cannot bear weight on that leg per ortho and so he asid he feels like the walker is unsteady and so he will takl to PT about that more. Not ebaring weight with a walker will be difficult no matter what and so therapy may focus on transfesr more as until she is allowed to bear some weight  or have boot post surgery in near future that it will be difficult given hopping will be the main thing she can do with the walker without bearing weight. BP imrpoved with boluses yesterday and holding home BP meds. She doesn't really like the food here so he's going to probably bring meds from osh to help boost her intake. Boost added as well for protein supplement.  Will f/u further ortho recs. Discussed plan with CM        Review of patient's allergies indicates:   Allergen Reactions    Boniva [ibandronate] Other (See Comments)     SHOULDER PAIN    Demerol [meperidine]     Ramipril Swelling       No current facility-administered medications on file prior to encounter.     Current Outpatient Medications on File Prior to Encounter   Medication Sig    diclofenac (VOLTAREN) 75 MG EC tablet Take 75 mg by mouth 2 (two) times daily.    escitalopram oxalate (LEXAPRO) 10 MG tablet Take 10 mg by mouth.    estrogens, conjugated, (PREMARIN) 0.625 MG tablet Take 0.625 mg by mouth once daily.    finasteride (PROPECIA) 1 mg tablet Take 1 mg by mouth.     levothyroxine (SYNTHROID) 125 MCG tablet TAKE 1 TABLET BY MOUTH EVERY MORNING    losartan (COZAAR) 100 MG tablet TAKE 1 TABLET BY MOUTH EVERY DAY    meloxicam (MOBIC) 15 MG tablet Take 15 mg by mouth once daily.    sumatriptan (IMITREX) 100 MG tablet     atorvastatin (LIPITOR) 20 MG tablet Take 20 mg by mouth every evening.    EPINEPHrine (EPIPEN) 0.3 mg/0.3 mL AtIn Inject 0.3 mLs (0.3 mg total) into the muscle once. for 1 dose    hydroCHLOROthiazide (HYDRODIURIL) 12.5 MG Tab TAKE 1 TABLET BY MOUTH EVERY DAY    metaxalone (SKELAXIN) 800 MG tablet Take 800 mg by mouth 3 (three) times daily as needed for Pain.    valACYclovir (VALTREX) 500 MG tablet TAKE 1 TABLET BY MOUTH EVERY DAY     Family History    None       Tobacco Use    Smoking status: Never    Smokeless tobacco: Never   Substance and Sexual Activity    Alcohol use: Not Currently    Drug use: Never    Sexual activity:  Yes     Partners: Male     Review of Systems   Constitutional:  Negative for appetite change, chills, fatigue and fever.   HENT:  Negative for congestion, rhinorrhea and sore throat.    Eyes:  Negative for photophobia and visual disturbance.   Respiratory:  Negative for cough, chest tightness and shortness of breath.    Cardiovascular:  Negative for chest pain and palpitations.   Gastrointestinal:  Negative for abdominal distention, abdominal pain, constipation, diarrhea, nausea and vomiting.   Genitourinary:  Negative for difficulty urinating, dysuria and frequency.   Musculoskeletal:  Positive for arthralgias, gait problem, joint swelling and myalgias. Negative for neck pain.   Skin:  Positive for color change.   Neurological:  Negative for dizziness, syncope, weakness, numbness and headaches.   Psychiatric/Behavioral:  Negative for confusion and hallucinations. The patient is not nervous/anxious.      Objective:     Vital Signs (Most Recent):  Temp: 97.4 °F (36.3 °C) (06/23/25 0700)  Pulse: (!) 56 (06/23/25 0700)  Resp: 16 (06/23/25 0700)  BP: (!) 117/58 (06/23/25 0700)  SpO2: 97 % (06/23/25 0700) Vital Signs (24h Range):  Temp:  [96.6 °F (35.9 °C)-98.5 °F (36.9 °C)] 97.4 °F (36.3 °C)  Pulse:  [56-69] 56  Resp:  [16-18] 16  SpO2:  [95 %-97 %] 97 %  BP: ()/(53-58) 117/58     Weight: 60.8 kg (133 lb 15.9 oz)  Body mass index is 21.63 kg/m².     Physical Exam  Vitals and nursing note reviewed.   Constitutional:       General: She is not in acute distress.     Appearance: Normal appearance. She is not toxic-appearing or diaphoretic.      Comments:  at bedside   HENT:      Head: Normocephalic and atraumatic.      Mouth/Throat:      Mouth: Mucous membranes are moist.      Pharynx: Oropharynx is clear.   Eyes:      Extraocular Movements: Extraocular movements intact.      Pupils: Pupils are equal, round, and reactive to light.   Cardiovascular:      Rate and Rhythm: Normal rate and regular rhythm.       Heart sounds: Normal heart sounds.   Pulmonary:      Effort: Pulmonary effort is normal. No respiratory distress.      Breath sounds: Normal breath sounds. No wheezing.   Abdominal:      General: Abdomen is flat. There is no distension.      Palpations: Abdomen is soft.      Tenderness: There is no abdominal tenderness. There is no guarding.   Musculoskeletal:         General: Tenderness and signs of injury present.      Cervical back: Normal range of motion.      Left ankle: Tenderness present.      Comments: LLE with short leg splint, able to wiggle toes. Toes slightly cool with cap refill of 2-3 seconds   Skin:     General: Skin is warm and dry.      Capillary Refill: Capillary refill takes 2 to 3 seconds.   Neurological:      General: No focal deficit present.      Mental Status: She is alert and oriented to person, place, and time. Mental status is at baseline.   Psychiatric:         Mood and Affect: Mood normal.         Behavior: Behavior normal.         Thought Content: Thought content normal.         Judgment: Judgment normal.              CRANIAL NERVES     CN III, IV, VI   Pupils are equal, round, and reactive to light.       Significant Labs: All pertinent labs within the past 24 hours have been reviewed.  CBC:   Recent Labs   Lab 06/21/25  1703 06/22/25  0344 06/23/25  0320   WBC 9.66 12.32 8.39   HGB 14.0 11.4* 10.8*   HCT 40.6 34.0* 32.2*    250 205     CMP:   Recent Labs   Lab 06/21/25  1703 06/22/25  0344 06/23/25  0320   * 139 135*   K 3.1* 3.5 3.8   CL 98 101 101   CO2 20* 25 25    96 88   BUN 28* 28* 28*   CREATININE 1.3 1.4 1.2   CALCIUM 10.6* 8.9 8.0*   PROT 7.5 5.8* 5.6*   ALBUMIN 3.8 3.0* 2.8*   BILITOT 0.5 0.3 0.2   ALKPHOS 68 53 49   AST 30 26 23   ALT 17 13 8*   ANIONGAP 17* 13 9       Significant Imaging: I have reviewed all pertinent imaging results/findings within the past 24 hours.  Imaging Results               CT Ankle (Including Hindfoot) Without Contrast Left  (Final result)  Result time 06/22/25 03:31:00      Final result by Alvarez Sharpe MD (06/22/25 03:31:00)                   Impression:      Trimalleolar ankle fracture with near anatomic alignment.    Remote or subacute nondisplaced fracture of the 2nd metatarsal.    This report was flagged in Epic as abnormal.    Electronically signed by resident: Tito Bernard  Date:    06/22/2025  Time:    02:24    Electronically signed by: Alvarez Sharpe MD  Date:    06/22/2025  Time:    03:31               Narrative:    EXAMINATION:  CT ANKLE (INCLUDING HINDFOOT) WITHOUT CONTRAST LEFT; CT 3D RENDERING ON AN INDEPENDENT WORKSTATION    CLINICAL HISTORY:  Fracture, ankle;; Fracture, ankle;fracture;    TECHNIQUE:  CT of the left ankle was performed without the administration of intravenous contrast. 3D reconstructed images were acquired by post processing for a better visualization of the anatomic structures, with concurrent supervision and archived for permanent records.    COMPARISON:  X-ray ankle 06/21/2025, x-ray foot 06/21/2025.    FINDINGS:  Again seen are acute mildly displaced comminuted fractures of the distal tibia and fibula.  Satisfactory alignment of the fracture fragments.  Remote or subacute appearing nondisplaced fracture of the proximal 2nd metatarsal.  Ankle joint effusion.  No other fractures or dislocation.  Generalized osteopenia.    There is extensive soft tissue stranding about the ankle.  Calcific atherosclerosis of the partially visualized lower extremity vessels.  Cast material overlies the lower extremity.                                       X-Ray Chest 1 View (Final result)  Result time 06/22/25 00:50:12      Final result by Alvarez Sharpe MD (06/22/25 00:50:12)                   Impression:      No acute finding identified on this limited single view.      Electronically signed by: Alvarez Shapre MD  Date:    06/22/2025  Time:    00:50               Narrative:    EXAMINATION:  XR  "CHEST 1 VIEW    CLINICAL HISTORY:  Provided history is "pre op eval;  ".    TECHNIQUE:  One view of the chest.    COMPARISON:  11/28/2025.    FINDINGS:  Nonspecific prominent linear metallic radiodensities overlie the chest and mediastinum.  Cardiomediastinal silhouette is not enlarged.  There are coarse interstitial lung markings but no large focal area of consolidation.  No large pleural effusion.  No pneumothorax.                                       X-Ray Ankle Complete Left (Final result)  Result time 06/21/25 23:24:03      Final result by Alvarez Sharpe MD (06/21/25 23:24:03)                   Impression:      Near anatomic alignment of previously seen ankle fracture/dislocation.      Electronically signed by: Alvarez Sharpe MD  Date:    06/21/2025  Time:    23:24               Narrative:    EXAMINATION:  XR ANKLE COMPLETE 3 VIEW LEFT    CLINICAL HISTORY:  Unspecified fall, initial encounter    TECHNIQUE:  AP, lateral and oblique views of the left ankle were performed.    COMPARISON:  06/21/2025.    FINDINGS:  Interval placement of overlying cast material, which limits fine bony and soft tissue detail.  Improved alignment of ankle fracture and improved alignment of the ankle joint.  Suspected trimalleolar ankle fracture now demonstrates anatomic alignment.  No new acute displaced fracture.  No new dislocation.  Soft tissue edema about the ankle.  No unexpected radiopaque foreign body.                                       X-Ray Tibia Fibula 2 View Left (Final result)  Result time 06/21/25 18:15:43      Final result by Benito Waldron MD (06/21/25 18:15:43)                   Impression:      1. Distal fibular fracture noting tibiotalar dislocation.  Reimaging following closed reduction is recommended for more detailed evaluation of the distal tibia to definitively exclude fracture.      Electronically signed by: Benito Waldron MD  Date:    06/21/2025  Time:    18:15               Narrative:    " EXAMINATION:  XR TIBIA FIBULA 2 VIEW LEFT; XR ANKLE COMPLETE 3 VIEW LEFT    CLINICAL HISTORY:  Unspecified fall, initial encounter    TECHNIQUE:  AP and lateral views of the left tibia and fibula were performed.  Three views left ankle.    COMPARISON:  None.    FINDINGS:  Four views left tibia fibula, three views left ankle.    There is avulsion fracture involving the distal aspect of the fibula noting displacement of the distal fracture fragment.  There is posterior dislocation of the tibiotalar articulation.  There is questionable fracture of the medial malleolus though not confirmed.  Reimaging is recommended following closed reduction to definitively exclude fracture.  There are degenerative changes of the knee.  There is osteopenia.  There is edema about the lower leg and ankle.                                       X-Ray Foot Complete Left (Final result)  Result time 06/21/25 18:15:08      Final result by Benito Waldron MD (06/21/25 18:15:08)                   Impression:      1. There is fracture involving the proximal aspect of the 2nd metatarsal and possibly 3rd.  No significant overlying edema, correlation with any focal tenderness is recommended as this could reflect irregular healing of prior fracture.  Please see separately dictated report for details of the distal tibia and fibula.      Electronically signed by: Benito Waldron MD  Date:    06/21/2025  Time:    18:15               Narrative:    EXAMINATION:  XR FOOT COMPLETE 3 VIEW LEFT    CLINICAL HISTORY:  .  Unspecified fall, initial encounter    TECHNIQUE:  AP, lateral and oblique views of the left foot were performed.    COMPARISON:  None    FINDINGS:  Two views left foot.    No acute displaced fracture or dislocates cracks that there is osteopenia.  There are degenerative changes of the foot.  There is fracture involving the proximal aspect of the 2nd metatarsal and possibly 3rd metatarsal.  No significant edema along the dorsal aspect of  the foot.  Please see separately dictated report for details of the ankle.                                       X-Ray Ankle Complete Left (Final result)  Result time 06/21/25 18:15:43      Final result by Benito Waldron MD (06/21/25 18:15:43)                   Impression:      1. Distal fibular fracture noting tibiotalar dislocation.  Reimaging following closed reduction is recommended for more detailed evaluation of the distal tibia to definitively exclude fracture.      Electronically signed by: Benito Waldron MD  Date:    06/21/2025  Time:    18:15               Narrative:    EXAMINATION:  XR TIBIA FIBULA 2 VIEW LEFT; XR ANKLE COMPLETE 3 VIEW LEFT    CLINICAL HISTORY:  Unspecified fall, initial encounter    TECHNIQUE:  AP and lateral views of the left tibia and fibula were performed.  Three views left ankle.    COMPARISON:  None.    FINDINGS:  Four views left tibia fibula, three views left ankle.    There is avulsion fracture involving the distal aspect of the fibula noting displacement of the distal fracture fragment.  There is posterior dislocation of the tibiotalar articulation.  There is questionable fracture of the medial malleolus though not confirmed.  Reimaging is recommended following closed reduction to definitively exclude fracture.  There are degenerative changes of the knee.  There is osteopenia.  There is edema about the lower leg and ankle.

## 2025-06-23 NOTE — SUBJECTIVE & OBJECTIVE
Principal Problem:Closed displaced trimalleolar fracture of left ankle    Principal Orthopedic Problem:  As above    Interval History:  Patient is seen and examined at the bedside this morning.  She is lying in bed with eye mask in place.  She never takes the eye mask off during my encounter.  She does report that her pain is well-controlled at rest.  Of the left lower extremity is elevated on a stack of blankets were short-leg splint in place.  Her , who is at the bedside, states that he thinks that his wife is weak and is hesitant for surgery though he understands the necessity for operative fixation.    Review of patient's allergies indicates:   Allergen Reactions    Boniva [ibandronate] Other (See Comments)     SHOULDER PAIN    Demerol [meperidine]     Ramipril Swelling       Current Facility-Administered Medications   Medication    acetaminophen tablet 1,000 mg    aspirin EC tablet 81 mg    atorvastatin tablet 20 mg    dextrose 50% injection 12.5 g    dextrose 50% injection 25 g    EScitalopram oxalate tablet 10 mg    glucagon (human recombinant) injection 1 mg    glucose chewable tablet 16 g    glucose chewable tablet 24 g    levothyroxine tablet 125 mcg    melatonin tablet 6 mg    methocarbamoL tablet 500 mg    morphine injection 2 mg    naloxone 0.4 mg/mL injection 0.02 mg    ondansetron injection 4 mg    oxyCODONE immediate release tablet 5 mg    oxyCODONE immediate release tablet Tab 10 mg    polyethylene glycol packet 17 g    pregabalin capsule 50 mg    senna-docusate 8.6-50 mg per tablet 1 tablet    sodium chloride 0.9% flush 10 mL    sumatriptan tablet 100 mg     Objective:     Vital Signs (Most Recent):  Temp: 97.4 °F (36.3 °C) (06/23/25 0700)  Pulse: (!) 56 (06/23/25 0700)  Resp: 16 (06/23/25 0700)  BP: (!) 117/58 (06/23/25 0700)  SpO2: 97 % (06/23/25 0700) Vital Signs (24h Range):  Temp:  [96.6 °F (35.9 °C)-98.5 °F (36.9 °C)] 97.4 °F (36.3 °C)  Pulse:  [56-69] 56  Resp:  [16-18] 16  SpO2:  [95  "%-97 %] 97 %  BP: ()/(53-58) 117/58     Weight: 60.8 kg (133 lb 15.9 oz)  Height: 5' 6" (167.6 cm)  Body mass index is 21.63 kg/m².      Intake/Output Summary (Last 24 hours) at 6/23/2025 1002  Last data filed at 6/23/2025 0803  Gross per 24 hour   Intake 300 ml   Output 1200 ml   Net -900 ml        Ortho/SPM Exam  NAD, resting comfortably in bed with I am asking place.  She never removed the eye mask or makes eye contact during my encounter.  Breathing comfortably w/o distress   Extremities WWP     LLE:   Short-leg splint in place.  The leg is elevated.    Splint windowed both anteriorly, and laterally.    Of the anterior aspect of the distal tibia, there are several small serous containing blisters.  The skin of the anterior ankle does wrinkle.    Skin intact of the lateral ankle.  She is tender to palpation along the lateral ankle but the skin does wrinkle here as well.    Sensation intact to light touch to all the toes.    EHL, FHL intact.  Wiggles all other toes.    Foot is warm and well-perfused     Significant Labs: All pertinent labs within the past 24 hours have been reviewed.    Significant Imaging: I have reviewed and interpreted all pertinent imaging results/findings.  "

## 2025-06-23 NOTE — ASSESSMENT & PLAN NOTE
Hypercalcemia is likely due to likely volume depletion on admit and improved after IVF. The patient has the following symptoms due to their hypercalcemia: None (asymptomatic). Their most recent calcium and albumin results are listed below.  Recent Labs     06/21/25  1703 06/22/25  0344 06/23/25  0320   CALCIUM 10.6* 8.9 8.0*   ALBUMIN 3.8 3.0* 2.8*     Plan  - Obtain the following labs to work up the cause of hypercalcemia: none.   - Treat the hypercalcemia with: IVF bolus.   - The patient's hypercalcemia is resolved.

## 2025-06-23 NOTE — ASSESSMENT & PLAN NOTE
Caity Ashby is a 85 y.o. female with a left trimalleolar ankle fracture.  Presented closed, neurovascularly intact, and without any other musculoskeletal injuries on physical exam.  Closed reduction was performed in the emergency department.  The patient was admitted given concern for her to safely remain nonweightbearing at home.    06/23/2025:  Patient with a left trimalleolar ankle fracture dislocation who was closed reduced in the emergency department on 06/21/2025.  She was seen and examined at the bedside this morning.  The skin of the anterior ankle does wrinkle though she has several serous containing blisters of the anterior aspect of the distal tibia.  The skin also wrinkles along the lateral ankle where she is tender to palpation along the distal fibula.  She has consented for operative fixation of the left trimalleolar fracture dislocation; we will plan to recheck her swelling tomorrow and take her to the OR for definitive fixation once her swelling allows.     Plan:   - NWB to the LLE  - DVT ppx: ASA 81mg bid  - Aggressively ice and elevate the LLE on a stack of sheets at all times   - Multimodal pain control, limiting narcotics.  - NPO midnight

## 2025-06-24 ENCOUNTER — PATIENT MESSAGE (OUTPATIENT)
Dept: ORTHOPEDICS | Facility: CLINIC | Age: 85
End: 2025-06-24
Payer: COMMERCIAL

## 2025-06-24 PROBLEM — R51.9 CHRONIC HEADACHES: Status: ACTIVE | Noted: 2025-06-24

## 2025-06-24 PROBLEM — D50.9 IRON DEFICIENCY ANEMIA: Status: ACTIVE | Noted: 2025-06-24

## 2025-06-24 PROBLEM — G89.29 CHRONIC HEADACHES: Status: ACTIVE | Noted: 2025-06-24

## 2025-06-24 PROBLEM — K59.00 CONSTIPATION: Status: ACTIVE | Noted: 2025-06-24

## 2025-06-24 LAB
ABSOLUTE EOSINOPHIL (OHS): 0.59 K/UL
ABSOLUTE MONOCYTE (OHS): 0.81 K/UL (ref 0.3–1)
ABSOLUTE NEUTROPHIL COUNT (OHS): 5.12 K/UL (ref 1.8–7.7)
ALBUMIN SERPL BCP-MCNC: 2.7 G/DL (ref 3.5–5.2)
ALP SERPL-CCNC: 51 UNIT/L (ref 40–150)
ALT SERPL W/O P-5'-P-CCNC: 10 UNIT/L (ref 10–44)
ANION GAP (OHS): 9 MMOL/L (ref 8–16)
AST SERPL-CCNC: 22 UNIT/L (ref 11–45)
BASOPHILS # BLD AUTO: 0.06 K/UL
BASOPHILS NFR BLD AUTO: 0.7 %
BILIRUB SERPL-MCNC: 0.2 MG/DL (ref 0.1–1)
BUN SERPL-MCNC: 28 MG/DL (ref 8–23)
CALCIUM SERPL-MCNC: 8.2 MG/DL (ref 8.7–10.5)
CHLORIDE SERPL-SCNC: 104 MMOL/L (ref 95–110)
CO2 SERPL-SCNC: 24 MMOL/L (ref 23–29)
CREAT SERPL-MCNC: 1 MG/DL (ref 0.5–1.4)
ERYTHROCYTE [DISTWIDTH] IN BLOOD BY AUTOMATED COUNT: 12.3 % (ref 11.5–14.5)
FERRITIN SERPL-MCNC: 27 NG/ML (ref 20–300)
FOLATE SERPL-MCNC: 7.7 NG/ML (ref 4–24)
GFR SERPLBLD CREATININE-BSD FMLA CKD-EPI: 56 ML/MIN/1.73/M2
GLUCOSE SERPL-MCNC: 82 MG/DL (ref 70–110)
HCT VFR BLD AUTO: 32.6 % (ref 37–48.5)
HGB BLD-MCNC: 10.9 GM/DL (ref 12–16)
IMM GRANULOCYTES # BLD AUTO: 0.02 K/UL (ref 0–0.04)
IMM GRANULOCYTES NFR BLD AUTO: 0.2 % (ref 0–0.5)
LYMPHOCYTES # BLD AUTO: 1.72 K/UL (ref 1–4.8)
MAGNESIUM SERPL-MCNC: 2.1 MG/DL (ref 1.6–2.6)
MCH RBC QN AUTO: 34 PG (ref 27–31)
MCHC RBC AUTO-ENTMCNC: 33.4 G/DL (ref 32–36)
MCV RBC AUTO: 102 FL (ref 82–98)
NUCLEATED RBC (/100WBC) (OHS): 0 /100 WBC
PLATELET # BLD AUTO: 215 K/UL (ref 150–450)
PMV BLD AUTO: 11.4 FL (ref 9.2–12.9)
POTASSIUM SERPL-SCNC: 3.8 MMOL/L (ref 3.5–5.1)
PROT SERPL-MCNC: 5.6 GM/DL (ref 6–8.4)
RBC # BLD AUTO: 3.21 M/UL (ref 4–5.4)
RELATIVE EOSINOPHIL (OHS): 7.1 %
RELATIVE LYMPHOCYTE (OHS): 20.7 % (ref 18–48)
RELATIVE MONOCYTE (OHS): 9.7 % (ref 4–15)
RELATIVE NEUTROPHIL (OHS): 61.6 % (ref 38–73)
SODIUM SERPL-SCNC: 137 MMOL/L (ref 136–145)
VIT B12 SERPL-MCNC: 729 PG/ML (ref 210–950)
WBC # BLD AUTO: 8.32 K/UL (ref 3.9–12.7)

## 2025-06-24 PROCEDURE — 82607 VITAMIN B-12: CPT | Performed by: HOSPITALIST

## 2025-06-24 PROCEDURE — 82746 ASSAY OF FOLIC ACID SERUM: CPT | Performed by: HOSPITALIST

## 2025-06-24 PROCEDURE — 25000003 PHARM REV CODE 250: Performed by: NURSE PRACTITIONER

## 2025-06-24 PROCEDURE — 82728 ASSAY OF FERRITIN: CPT | Performed by: HOSPITALIST

## 2025-06-24 PROCEDURE — 11000001 HC ACUTE MED/SURG PRIVATE ROOM

## 2025-06-24 PROCEDURE — 36415 COLL VENOUS BLD VENIPUNCTURE: CPT | Performed by: HOSPITALIST

## 2025-06-24 PROCEDURE — 80053 COMPREHEN METABOLIC PANEL: CPT | Performed by: HOSPITALIST

## 2025-06-24 PROCEDURE — 83735 ASSAY OF MAGNESIUM: CPT | Performed by: HOSPITALIST

## 2025-06-24 PROCEDURE — 97535 SELF CARE MNGMENT TRAINING: CPT

## 2025-06-24 PROCEDURE — 25000003 PHARM REV CODE 250: Performed by: HOSPITALIST

## 2025-06-24 PROCEDURE — 85025 COMPLETE CBC W/AUTO DIFF WBC: CPT | Performed by: HOSPITALIST

## 2025-06-24 PROCEDURE — 97116 GAIT TRAINING THERAPY: CPT | Mod: CQ

## 2025-06-24 RX ORDER — BISACODYL 5 MG
10 TABLET, DELAYED RELEASE (ENTERIC COATED) ORAL DAILY
Status: DISCONTINUED | OUTPATIENT
Start: 2025-06-24 | End: 2025-06-25

## 2025-06-24 RX ORDER — SODIUM CHLORIDE 9 MG/ML
INJECTION, SOLUTION INTRAVENOUS CONTINUOUS
Status: ACTIVE | OUTPATIENT
Start: 2025-06-24 | End: 2025-06-25

## 2025-06-24 RX ORDER — LANOLIN ALCOHOL/MO/W.PET/CERES
1 CREAM (GRAM) TOPICAL 2 TIMES DAILY
Status: DISCONTINUED | OUTPATIENT
Start: 2025-06-24 | End: 2025-07-03 | Stop reason: HOSPADM

## 2025-06-24 RX ORDER — SYRING-NEEDL,DISP,INSUL,0.3 ML 29 G X1/2"
296 SYRINGE, EMPTY DISPOSABLE MISCELLANEOUS ONCE
Status: COMPLETED | OUTPATIENT
Start: 2025-06-24 | End: 2025-06-24

## 2025-06-24 RX ADMIN — PREGABALIN 50 MG: 50 CAPSULE ORAL at 08:06

## 2025-06-24 RX ADMIN — ACETAMINOPHEN 1000 MG: 500 TABLET ORAL at 09:06

## 2025-06-24 RX ADMIN — SODIUM CHLORIDE: 9 INJECTION, SOLUTION INTRAVENOUS at 10:06

## 2025-06-24 RX ADMIN — METHOCARBAMOL 500 MG: 500 TABLET ORAL at 09:06

## 2025-06-24 RX ADMIN — OXYCODONE 5 MG: 5 TABLET ORAL at 09:06

## 2025-06-24 RX ADMIN — FERROUS SULFATE TAB EC 325 MG (65 MG FE EQUIVALENT) 1 EACH: 325 (65 FE) TABLET DELAYED RESPONSE at 08:06

## 2025-06-24 RX ADMIN — ASPIRIN 81 MG: 81 TABLET, COATED ORAL at 08:06

## 2025-06-24 RX ADMIN — SENNOSIDES AND DOCUSATE SODIUM 1 TABLET: 50; 8.6 TABLET ORAL at 08:06

## 2025-06-24 RX ADMIN — SENNOSIDES AND DOCUSATE SODIUM 1 TABLET: 50; 8.6 TABLET ORAL at 09:06

## 2025-06-24 RX ADMIN — PREGABALIN 50 MG: 50 CAPSULE ORAL at 09:06

## 2025-06-24 RX ADMIN — MAGNESIUM CITRATE 296 ML: 1.75 LIQUID ORAL at 05:06

## 2025-06-24 RX ADMIN — Medication 6 MG: at 09:06

## 2025-06-24 RX ADMIN — OXYCODONE 5 MG: 5 TABLET ORAL at 02:06

## 2025-06-24 RX ADMIN — METHOCARBAMOL 500 MG: 500 TABLET ORAL at 02:06

## 2025-06-24 RX ADMIN — POLYETHYLENE GLYCOL 3350 17 G: 17 POWDER, FOR SOLUTION ORAL at 08:06

## 2025-06-24 RX ADMIN — BISACODYL 10 MG: 5 TABLET, COATED ORAL at 08:06

## 2025-06-24 RX ADMIN — FERROUS SULFATE TAB EC 325 MG (65 MG FE EQUIVALENT) 1 EACH: 325 (65 FE) TABLET DELAYED RESPONSE at 09:06

## 2025-06-24 RX ADMIN — LEVOTHYROXINE SODIUM 125 MCG: 0.12 TABLET ORAL at 06:06

## 2025-06-24 RX ADMIN — METHOCARBAMOL 500 MG: 500 TABLET ORAL at 08:06

## 2025-06-24 RX ADMIN — ATORVASTATIN CALCIUM 20 MG: 20 TABLET, FILM COATED ORAL at 09:06

## 2025-06-24 RX ADMIN — ACETAMINOPHEN 1000 MG: 500 TABLET ORAL at 02:06

## 2025-06-24 RX ADMIN — ACETAMINOPHEN 1000 MG: 500 TABLET ORAL at 06:06

## 2025-06-24 RX ADMIN — ESCITALOPRAM OXALATE 10 MG: 10 TABLET ORAL at 08:06

## 2025-06-24 RX ADMIN — ASPIRIN 81 MG: 81 TABLET, COATED ORAL at 09:06

## 2025-06-24 NOTE — ASSESSMENT & PLAN NOTE
Hypercalcemia is likely due to likely volume depletion on admit and improved after IVF. The patient has the following symptoms due to their hypercalcemia: None (asymptomatic). Their most recent calcium and albumin results are listed below.  Recent Labs     06/22/25  0344 06/23/25  0320 06/24/25  0246   CALCIUM 8.9 8.0* 8.2*   ALBUMIN 3.0* 2.8* 2.7*     Plan  - Obtain the following labs to work up the cause of hypercalcemia: none.   - Treat the hypercalcemia with: IVF bolus.   - The patient's hypercalcemia is resolved.

## 2025-06-24 NOTE — PT/OT/SLP PROGRESS
"Physical Therapy Treatment    Patient Name:  Caity Ashby   MRN:  [id number]    Recommendations:     Discharge Recommendations: High Intensity Therapy  Discharge Equipment Recommendations: bedside commode, walker, rolling, shower chair  Barriers to discharge: Decreased caregiver support    Assessment:     Caity Ashby is a 85 y.o. female admitted with a medical diagnosis of Closed displaced trimalleolar fracture of left ankle.  She presents with the following impairments/functional limitations: weakness, impaired endurance, impaired self care skills, impaired functional mobility, gait instability, impaired balance, decreased lower extremity function, decreased safety awareness, pain, edema, orthopedic precautions.    Rehab Prognosis: Good; patient would benefit from acute skilled PT services to address these deficits and reach maximum level of function.    Recent Surgery: * No surgery found *      Plan:     During this hospitalization, patient to be seen 4 x/week to address the identified rehab impairments via gait training, therapeutic activities, therapeutic exercises, neuromuscular re-education and progress toward the following goals:    Plan of Care Expires:  07/22/25    Subjective     Chief Complaint: "I need to get to that bathroom"  Patient/Family Comments/goals: to go home   Pain/Comfort:  Pain Rating 1: 0/10      Objective:     Communicated with RN prior to session.  Patient found HOB elevated with PureWick upon PT entry to room.     General Precautions: Standard, fall  Orthopedic Precautions: LLE non weight bearing  Braces:  (cast)  Respiratory Status: Room air     Functional Mobility:  Bed Mobility:     Supine to Sit: stand by assistance  Transfers:     Sit to Stand:  contact guard assistance with rolling walker  Gait: 10 feet with RW and min A for balance and AD management.       AM-PAC 6 CLICK MOBILITY  Turning over in bed (including adjusting bedclothes, sheets and blankets)?: 4  Sitting " down on and standing up from a chair with arms (e.g., wheelchair, bedside commode, etc.): 4  Moving from lying on back to sitting on the side of the bed?: 4  Moving to and from a bed to a chair (including a wheelchair)?: 3  Need to walk in hospital room?: 2  Climbing 3-5 steps with a railing?: 1  Basic Mobility Total Score: 18       Treatment & Education:  Bedside table in front of patient and area set up for function, convenience, and safety. RN aware of patient's mobility needs and status. Questions/concerns addressed within PTA scope of practice; patient  with no further questions. Time was provided for active listening, discussion of health disposition, and discussion of safe discharge.    Patient left up in chair with all lines intact and call button in reach..    GOALS:   Multidisciplinary Problems       Physical Therapy Goals          Problem: Physical Therapy    Goal Priority Disciplines Outcome Interventions   Physical Therapy Goal     PT, PT/OT     Description: Goals to be met by: 25     Patient will increase functional independence with mobility by performin. Supine to sit with Set-up Casey  2. Sit to supine with Contact Guard Assistance  3. Sit to stand transfer with Stand-by Assistance using LRAD as appropriate  4. Bed to chair transfer with Contact Guard Assistance using LRAD as appropriate  5. Gait  x 50 feet with Contact Guard Assistance using LRAD as appropriate.   6. Ascend/Descend 4 inch curb step with Minimal Assistance using LRAD as appropriate.  7. Stand for 8 minutes with Stand-by Assistance using LRAD as appropriate  8. Lower extremity exercise program x30 reps per handout, with assistance as needed    DME Justifications (see above for complete DME recommendations)    Bedside Commode- Patient has a mobility limitation that significantly impairs their ability to participate in one or more mobility related activities of daily living, including toileting. This deficit can be  resolved by using a bedside commode. Patient demonstrates mobility limitations that will cause them to be confined to one room at home without bathroom access for up to 30 days. Using a bedside commode will greatly improve the patient's ability to participate in MRADLs.     Rolling Walker- Patient demonstrates a mobility limitation that significantly impairs their ability to participate in one or more mobility related activities of daily living. Patient's mobility limitation cannot be sufficiently resolved with the use of a cane, but can be sufficiently resolved with the use of a rolling walker.The use of a rolling walker will considerably improve their ability to participate in MRADLs. Patient will use the walker on a regular basis at home.                         DME Justifications:   Caity's mobility limitation cannot be sufficiently resolved by the use of a cane. Her functional mobility deficit can be sufficiently resolved with the use of a Rolling Walker. Patient's mobility limitation significantly impairs their ability to participate in one of more activities of daily living.  The use of a RW will significantly improve the patient's ability to participate in MRADLS and the patient will use it on regular basis in the home.    Time Tracking:     PT Received On: 06/24/25  PT Start Time: 1335     PT Stop Time: 1348  PT Total Time (min): 13 min     Billable Minutes: Gait Training 13    Treatment Type: Treatment  PT/PTA: PTA     Number of PTA visits since last PT visit: 1 06/24/2025

## 2025-06-24 NOTE — PROGRESS NOTES
[facility]  Orthopedics  Progress Note    Patient Name: Caity Ashby  MRN: [id number]  Admission Date: 6/21/2025  Hospital Length of Stay: 0 days  Attending Provider: Lisa Bloom MD  Primary Care Provider: Erasmo Jones MD    Subjective:     Principal Problem:Closed displaced trimalleolar fracture of left ankle    Principal Orthopedic Problem:  As above    Interval History:  No acute events overnight.  Afebrile, hemodynamically stable.  Pain is well-controlled.  Left lower extremity has been iced and elevated and short-leg splint.  NPO since midnight in preparation for possible surgery today.    Review of patient's allergies indicates:   Allergen Reactions    Boniva [ibandronate] Other (See Comments)     SHOULDER PAIN    Demerol [meperidine]     Ramipril Swelling       Current Facility-Administered Medications   Medication    acetaminophen tablet 1,000 mg    aspirin EC tablet 81 mg    atorvastatin tablet 20 mg    bisacodyL EC tablet 10 mg    dextrose 50% injection 12.5 g    dextrose 50% injection 25 g    EScitalopram oxalate tablet 10 mg    ferrous sulfate tablet 1 each    glucagon (human recombinant) injection 1 mg    glucose chewable tablet 16 g    glucose chewable tablet 24 g    levothyroxine tablet 125 mcg    melatonin tablet 6 mg    methocarbamoL tablet 500 mg    morphine injection 2 mg    naloxone 0.4 mg/mL injection 0.02 mg    ondansetron injection 4 mg    oxyCODONE immediate release tablet 5 mg    oxyCODONE immediate release tablet Tab 10 mg    polyethylene glycol packet 17 g    pregabalin capsule 50 mg    senna-docusate 8.6-50 mg per tablet 1 tablet    sodium chloride 0.9% flush 10 mL    sumatriptan tablet 100 mg     Objective:     Vital Signs (Most Recent):  Temp: 96.7 °F (35.9 °C) (06/24/25 1102)  Pulse: 63 (06/24/25 1102)  Resp: 17 (06/24/25 1102)  BP: (!) 120/56 (06/24/25 1102)  SpO2: 95 % (06/24/25 1102) Vital Signs (24h Range):  Temp:  [96.7 °F (35.9 °C)-98.8 °F (37.1 °C)]  "96.7 °F (35.9 °C)  Pulse:  [63-70] 63  Resp:  [17-18] 17  SpO2:  [94 %-97 %] 95 %  BP: (106-129)/(49-63) 120/56     Weight: 60.8 kg (133 lb 15.9 oz)  Height: 5' 6" (167.6 cm)  Body mass index is 21.63 kg/m².    No intake or output data in the 24 hours ending 06/24/25 1405     Ortho/SPM Exam  NAD, resting comfortably in bed  A&O x3   Breathing comfortably w/o distress   Extremities WWP     LLE:   Short-leg splint in place.  Splint iced and elevated.    Splint windowed, there is a small serous blister of the anterior aspect of the distal tibia.  The skin of the anterior and lateral aspect of the distal leg do wrinkle but only slightly.    She is still moderately swollen.    Sensation intact to light touch throughout.    Wiggles all toes.     Significant Labs:   Recent Lab Results         06/24/25  0246        Albumin 2.7       ALP 51       ALT 10       Anion Gap 9       AST 22       Baso # 0.06       Basophil % 0.7       BILIRUBIN TOTAL 0.2  Comment: For infants and newborns, interpretation of results should be based   on gestational age, weight and in agreement with clinical   observations.    Premature Infant recommended reference ranges:   0-24 hours:  <8.0 mg/dL   24-48 hours: <12.0 mg/dL   3-5 days:    <15.0 mg/dL   6-29 days:   <15.0 mg/dL       BUN 28       Calcium 8.2       Chloride 104       CO2 24       Creatinine 1.0       eGFR 56  Comment: Estimated GFR calculated using the CKD-EPI creatinine (2021) equation.       Eos # 0.59       Eos % 7.1       Ferritin 27.0       Folate 7.7       Glucose 82       Gran # (ANC) 5.12       Hematocrit 32.6       Hemoglobin 10.9       Immature Grans (Abs) 0.02  Comment: Mild elevation in immature granulocytes is non specific and can be seen in a variety of conditions including stress response, acute inflammation, trauma and pregnancy. Correlation with other laboratory and clinical findings is essential.       Immature Granulocytes 0.2       Lymph # 1.72       Lymph % 20.7   "     Magnesium  2.1       MCH 34.0       MCHC 33.4              Mono # 0.81       Mono % 9.7       MPV 11.4       Neut % 61.6       nRBC 0       Platelet Count 215       Potassium 3.8       PROTEIN TOTAL 5.6       RBC 3.21       RDW 12.3       Sodium 137       Vitamin B12 729       WBC 8.32             All pertinent labs within the past 24 hours have been reviewed.    Significant Imaging: I have reviewed all pertinent imaging results/findings.  Assessment/Plan:     * Closed displaced trimalleolar fracture of left ankle  Caity Ashby is a 85 y.o. female with a left trimalleolar ankle fracture.  Presented closed, neurovascularly intact, and without any other musculoskeletal injuries on physical exam.  Closed reduction was performed in the emergency department.  The patient was admitted given concern for her to safely remain nonweightbearing at home.    06/24/2025:  Patient is seen and examined at the bedside this morning.  Her splint is windowed; swelling is still present in her skin only barely wrinkles.  She has not yet amenable to definitive fixation.  She may have a diet today.  Surgery pending soft tissues.     Plan:   - NWB to the LLE  - DVT ppx: ASA 81mg bid  - Aggressively ice and elevate the LLE on a stack of sheets at all times   - Multimodal pain control, limiting narcotics.  - NPO midnight         NADIRA Forrester MD  Orthopedics  [facility]

## 2025-06-24 NOTE — ASSESSMENT & PLAN NOTE
Anemia is likely due to Iron deficiency. Most recent hemoglobin and hematocrit are listed below.  Recent Labs     06/22/25  0344 06/23/25  0320 06/24/25  0246   HGB 11.4* 10.8* 10.9*   HCT 34.0* 32.2* 32.6*     Plan  - Monitor serial CBC: Daily  - Transfuse PRBC if patient becomes hemodynamically unstable, symptomatic or H/H drops below 7/21.  - Patient has not received any PRBC transfusions to date  - Patient's anemia is currently stable

## 2025-06-24 NOTE — SUBJECTIVE & OBJECTIVE
Interval history- ortho reports no surgery today so diet ordered. BP improved to 110s-120s, cont to hold BP meds. Her  reports her bP plummets easily when NPO as happened in the past when she had hysterectomy also so if she is made NPO again/when she is for surgical planning will plan to do IVF the night before to try to avoid this. Headaches and home imitrex ordered if needs. Iron low at 27 and b12/folate okay and discussed with them. She no longer gets colonoscopies as have always been normal and age, had hysterectomy hx and no pap smears anymore given age she reports. No vaginal or gi bleeding ever. Her  reports dietary intake can be poor and so suspect likely dietary intake as cause and so start iron BID now given this and would recheck in 3months. If not improving or any of above, would then consider  further diagnostic work ups for other causes. Her apeptite has been spotty here at times he reports still. No BM since before admit so dulcolax PO requested as that typically works at home for her. Plan for surgery here once tisues amenable. Per ortho staff note, if they go to surgerize and not ready then would have to consider ex fix as reduced in hard splint now and that is keeping reduced and if removed for surgery then would consider ex fix if they could not do the ORIF at that time if tissue was too swollen and her and her usband are aware. Will f/u ortho when timing may be for OR as will need placement after surgery and not safe to go home in between procedures. CM aware of plan as is  and patient          Review of patient's allergies indicates:   Allergen Reactions    Boniva [ibandronate] Other (See Comments)     SHOULDER PAIN    Demerol [meperidine]     Ramipril Swelling       No current facility-administered medications on file prior to encounter.     Current Outpatient Medications on File Prior to Encounter   Medication Sig    diclofenac (VOLTAREN) 75 MG EC tablet Take 75 mg by  mouth 2 (two) times daily.    escitalopram oxalate (LEXAPRO) 10 MG tablet Take 10 mg by mouth.    estrogens, conjugated, (PREMARIN) 0.625 MG tablet Take 0.625 mg by mouth once daily.    finasteride (PROPECIA) 1 mg tablet Take 1 mg by mouth.     levothyroxine (SYNTHROID) 125 MCG tablet TAKE 1 TABLET BY MOUTH EVERY MORNING    losartan (COZAAR) 100 MG tablet TAKE 1 TABLET BY MOUTH EVERY DAY    meloxicam (MOBIC) 15 MG tablet Take 15 mg by mouth once daily.    sumatriptan (IMITREX) 100 MG tablet     atorvastatin (LIPITOR) 20 MG tablet Take 20 mg by mouth every evening.    EPINEPHrine (EPIPEN) 0.3 mg/0.3 mL AtIn Inject 0.3 mLs (0.3 mg total) into the muscle once. for 1 dose    hydroCHLOROthiazide (HYDRODIURIL) 12.5 MG Tab TAKE 1 TABLET BY MOUTH EVERY DAY    metaxalone (SKELAXIN) 800 MG tablet Take 800 mg by mouth 3 (three) times daily as needed for Pain.    valACYclovir (VALTREX) 500 MG tablet TAKE 1 TABLET BY MOUTH EVERY DAY     Family History    None       Tobacco Use    Smoking status: Never    Smokeless tobacco: Never   Substance and Sexual Activity    Alcohol use: Not Currently    Drug use: Never    Sexual activity: Yes     Partners: Male     Review of Systems   Constitutional:  Negative for appetite change, chills, fatigue and fever.   HENT:  Negative for congestion, rhinorrhea and sore throat.    Eyes:  Negative for photophobia and visual disturbance.   Respiratory:  Negative for cough, chest tightness and shortness of breath.    Cardiovascular:  Negative for chest pain and palpitations.   Gastrointestinal:  Negative for abdominal distention, abdominal pain, constipation, diarrhea, nausea and vomiting.   Genitourinary:  Negative for difficulty urinating, dysuria and frequency.   Musculoskeletal:  Positive for arthralgias, gait problem, joint swelling and myalgias. Negative for neck pain.   Skin:  Positive for color change.   Neurological:  Negative for dizziness, syncope, weakness, numbness and headaches.    Psychiatric/Behavioral:  Negative for confusion and hallucinations. The patient is not nervous/anxious.      Objective:     Vital Signs (Most Recent):  Temp: 96.7 °F (35.9 °C) (06/24/25 1102)  Pulse: 63 (06/24/25 1102)  Resp: 17 (06/24/25 1102)  BP: (!) 120/56 (06/24/25 1102)  SpO2: 95 % (06/24/25 1102) Vital Signs (24h Range):  Temp:  [96.7 °F (35.9 °C)-98.8 °F (37.1 °C)] 96.7 °F (35.9 °C)  Pulse:  [63-70] 63  Resp:  [17-18] 17  SpO2:  [94 %-97 %] 95 %  BP: (106-129)/(49-63) 120/56     Weight: 60.8 kg (133 lb 15.9 oz)  Body mass index is 21.63 kg/m².     Physical Exam  Vitals and nursing note reviewed.   Constitutional:       General: She is not in acute distress.     Appearance: Normal appearance. She is not toxic-appearing or diaphoretic.      Comments:  at bedside   HENT:      Head: Normocephalic and atraumatic.      Mouth/Throat:      Mouth: Mucous membranes are moist.      Pharynx: Oropharynx is clear.   Eyes:      Extraocular Movements: Extraocular movements intact.      Pupils: Pupils are equal, round, and reactive to light.   Cardiovascular:      Rate and Rhythm: Normal rate and regular rhythm.      Heart sounds: Normal heart sounds.   Pulmonary:      Effort: Pulmonary effort is normal. No respiratory distress.      Breath sounds: Normal breath sounds. No wheezing.   Abdominal:      General: Abdomen is flat. There is no distension.      Palpations: Abdomen is soft.      Tenderness: There is no abdominal tenderness. There is no guarding.   Musculoskeletal:         General: Tenderness and signs of injury present.      Cervical back: Normal range of motion.      Left ankle: Tenderness present.      Comments: LLE with short leg splint, able to wiggle toes. Toes slightly cool with cap refill of 2-3 seconds   Skin:     General: Skin is warm and dry.      Capillary Refill: Capillary refill takes 2 to 3 seconds.   Neurological:      General: No focal deficit present.      Mental Status: She is alert and  oriented to person, place, and time. Mental status is at baseline.   Psychiatric:         Mood and Affect: Mood normal.         Behavior: Behavior normal.         Thought Content: Thought content normal.         Judgment: Judgment normal.              CRANIAL NERVES     CN III, IV, VI   Pupils are equal, round, and reactive to light.       Significant Labs: All pertinent labs within the past 24 hours have been reviewed.  CBC:   Recent Labs   Lab 06/23/25  0320 06/24/25  0246   WBC 8.39 8.32   HGB 10.8* 10.9*   HCT 32.2* 32.6*    215     CMP:   Recent Labs   Lab 06/23/25  0320 06/24/25  0246   * 137   K 3.8 3.8    104   CO2 25 24   GLU 88 82   BUN 28* 28*   CREATININE 1.2 1.0   CALCIUM 8.0* 8.2*   PROT 5.6* 5.6*   ALBUMIN 2.8* 2.7*   BILITOT 0.2 0.2   ALKPHOS 49 51   AST 23 22   ALT 8* 10   ANIONGAP 9 9       Significant Imaging: I have reviewed all pertinent imaging results/findings within the past 24 hours.  Imaging Results               CT Ankle (Including Hindfoot) Without Contrast Left (Final result)  Result time 06/22/25 03:31:00      Final result by Alvarez Sharpe MD (06/22/25 03:31:00)                   Impression:      Trimalleolar ankle fracture with near anatomic alignment.    Remote or subacute nondisplaced fracture of the 2nd metatarsal.    This report was flagged in Epic as abnormal.    Electronically signed by resident: Tito Bernard  Date:    06/22/2025  Time:    02:24    Electronically signed by: Alvarez Sharpe MD  Date:    06/22/2025  Time:    03:31               Narrative:    EXAMINATION:  CT ANKLE (INCLUDING HINDFOOT) WITHOUT CONTRAST LEFT; CT 3D RENDERING ON AN INDEPENDENT WORKSTATION    CLINICAL HISTORY:  Fracture, ankle;; Fracture, ankle;fracture;    TECHNIQUE:  CT of the left ankle was performed without the administration of intravenous contrast. 3D reconstructed images were acquired by post processing for a better visualization of the anatomic structures, with  "concurrent supervision and archived for permanent records.    COMPARISON:  X-ray ankle 06/21/2025, x-ray foot 06/21/2025.    FINDINGS:  Again seen are acute mildly displaced comminuted fractures of the distal tibia and fibula.  Satisfactory alignment of the fracture fragments.  Remote or subacute appearing nondisplaced fracture of the proximal 2nd metatarsal.  Ankle joint effusion.  No other fractures or dislocation.  Generalized osteopenia.    There is extensive soft tissue stranding about the ankle.  Calcific atherosclerosis of the partially visualized lower extremity vessels.  Cast material overlies the lower extremity.                                       X-Ray Chest 1 View (Final result)  Result time 06/22/25 00:50:12      Final result by Alvarez Sharpe MD (06/22/25 00:50:12)                   Impression:      No acute finding identified on this limited single view.      Electronically signed by: Alvarez Sharpe MD  Date:    06/22/2025  Time:    00:50               Narrative:    EXAMINATION:  XR CHEST 1 VIEW    CLINICAL HISTORY:  Provided history is "pre op eval;  ".    TECHNIQUE:  One view of the chest.    COMPARISON:  11/28/2025.    FINDINGS:  Nonspecific prominent linear metallic radiodensities overlie the chest and mediastinum.  Cardiomediastinal silhouette is not enlarged.  There are coarse interstitial lung markings but no large focal area of consolidation.  No large pleural effusion.  No pneumothorax.                                       X-Ray Ankle Complete Left (Final result)  Result time 06/21/25 23:24:03      Final result by Alvarez Sharpe MD (06/21/25 23:24:03)                   Impression:      Near anatomic alignment of previously seen ankle fracture/dislocation.      Electronically signed by: Alvarez Sharpe MD  Date:    06/21/2025  Time:    23:24               Narrative:    EXAMINATION:  XR ANKLE COMPLETE 3 VIEW LEFT    CLINICAL HISTORY:  Unspecified fall, initial " encounter    TECHNIQUE:  AP, lateral and oblique views of the left ankle were performed.    COMPARISON:  06/21/2025.    FINDINGS:  Interval placement of overlying cast material, which limits fine bony and soft tissue detail.  Improved alignment of ankle fracture and improved alignment of the ankle joint.  Suspected trimalleolar ankle fracture now demonstrates anatomic alignment.  No new acute displaced fracture.  No new dislocation.  Soft tissue edema about the ankle.  No unexpected radiopaque foreign body.                                       X-Ray Tibia Fibula 2 View Left (Final result)  Result time 06/21/25 18:15:43      Final result by Benito Waldron MD (06/21/25 18:15:43)                   Impression:      1. Distal fibular fracture noting tibiotalar dislocation.  Reimaging following closed reduction is recommended for more detailed evaluation of the distal tibia to definitively exclude fracture.      Electronically signed by: Benito Waldron MD  Date:    06/21/2025  Time:    18:15               Narrative:    EXAMINATION:  XR TIBIA FIBULA 2 VIEW LEFT; XR ANKLE COMPLETE 3 VIEW LEFT    CLINICAL HISTORY:  Unspecified fall, initial encounter    TECHNIQUE:  AP and lateral views of the left tibia and fibula were performed.  Three views left ankle.    COMPARISON:  None.    FINDINGS:  Four views left tibia fibula, three views left ankle.    There is avulsion fracture involving the distal aspect of the fibula noting displacement of the distal fracture fragment.  There is posterior dislocation of the tibiotalar articulation.  There is questionable fracture of the medial malleolus though not confirmed.  Reimaging is recommended following closed reduction to definitively exclude fracture.  There are degenerative changes of the knee.  There is osteopenia.  There is edema about the lower leg and ankle.                                       X-Ray Foot Complete Left (Final result)  Result time 06/21/25 18:15:08       Final result by Benito Waldron MD (06/21/25 18:15:08)                   Impression:      1. There is fracture involving the proximal aspect of the 2nd metatarsal and possibly 3rd.  No significant overlying edema, correlation with any focal tenderness is recommended as this could reflect irregular healing of prior fracture.  Please see separately dictated report for details of the distal tibia and fibula.      Electronically signed by: Benito Waldron MD  Date:    06/21/2025  Time:    18:15               Narrative:    EXAMINATION:  XR FOOT COMPLETE 3 VIEW LEFT    CLINICAL HISTORY:  .  Unspecified fall, initial encounter    TECHNIQUE:  AP, lateral and oblique views of the left foot were performed.    COMPARISON:  None    FINDINGS:  Two views left foot.    No acute displaced fracture or dislocates cracks that there is osteopenia.  There are degenerative changes of the foot.  There is fracture involving the proximal aspect of the 2nd metatarsal and possibly 3rd metatarsal.  No significant edema along the dorsal aspect of the foot.  Please see separately dictated report for details of the ankle.                                       X-Ray Ankle Complete Left (Final result)  Result time 06/21/25 18:15:43      Final result by Benito Waldron MD (06/21/25 18:15:43)                   Impression:      1. Distal fibular fracture noting tibiotalar dislocation.  Reimaging following closed reduction is recommended for more detailed evaluation of the distal tibia to definitively exclude fracture.      Electronically signed by: Benito Waldron MD  Date:    06/21/2025  Time:    18:15               Narrative:    EXAMINATION:  XR TIBIA FIBULA 2 VIEW LEFT; XR ANKLE COMPLETE 3 VIEW LEFT    CLINICAL HISTORY:  Unspecified fall, initial encounter    TECHNIQUE:  AP and lateral views of the left tibia and fibula were performed.  Three views left ankle.    COMPARISON:  None.    FINDINGS:  Four views left tibia fibula, three views  left ankle.    There is avulsion fracture involving the distal aspect of the fibula noting displacement of the distal fracture fragment.  There is posterior dislocation of the tibiotalar articulation.  There is questionable fracture of the medial malleolus though not confirmed.  Reimaging is recommended following closed reduction to definitively exclude fracture.  There are degenerative changes of the knee.  There is osteopenia.  There is edema about the lower leg and ankle.

## 2025-06-24 NOTE — ASSESSMENT & PLAN NOTE
No previous diagnosis in chart but GFR appears to have been 40-50 since 2020.  Creatine stable for now. BMP reviewed- noted Estimated Creatinine Clearance: 39.9 mL/min (based on SCr of 1 mg/dL). according to latest data. Based on current GFR, CKD stage is stage 3 - GFR 30-59.  Monitor UOP and serial BMP and adjust therapy as needed. Renally dose meds. Avoid nephrotoxic medications and procedures.

## 2025-06-24 NOTE — PT/OT/SLP PROGRESS
Occupational Therapy   Co-Treatment    Name: Caity Ashby  MRN: [id number]  Admitting Diagnosis:  Closed displaced trimalleolar fracture of left ankle     Pt was co-treated with Physical Therapy to assess abilities and/or deficits for appropriate skilled interventions due to medical complexity, low endurance, and for increased safety.    Recommendations:     Discharge Recommendations: High Intensity Therapy  Discharge Equipment Recommendations:  bedside commode, walker, rolling, shower chair  Barriers to discharge:  Inaccessible home environment, Decreased caregiver support, Other (Comment) (requiring increased skilled assistance. Also awaiting sx)    Assessment:     Caity Ashby is a 85 y.o. female with a medical diagnosis of Closed displaced trimalleolar fracture of left ankle.  She presents with great motivation to improve self and wished to trial bathroom transfer. Receptive to therapeutic education. She requires increased assistance to participate in functional mobility safely and unable to fully complete Pt's original goal for safety and due to fatigue. Progressed to requiring one person assistance for transfers. Performance deficits affecting function are weakness, impaired endurance, decreased ROM, decreased coordination, orthopedic precautions, impaired self care skills, decreased upper extremity function, decreased lower extremity function, impaired functional mobility, gait instability, decreased safety awareness, impaired balance, pain.     Rehab Prognosis:  Good; patient would benefit from acute skilled OT services to address these deficits and reach maximum level of function.       Plan:     Patient to be seen 4 x/week to address the above listed problems via self-care/home management, therapeutic activities, therapeutic exercises, neuromuscular re-education, wheelchair management/training  Plan of Care Expires: 07/22/25  Plan of Care Reviewed with: patient    Subjective     Chief  "Complaint: Declined BSC despite encouragement to progress safely. Wished to try to get to the actual bathroom  Patient/Family Comments/goals: "You were right, I just had to try"   Pain/Comfort:  Pain Rating 1: other (see comments) (did not state)    Objective:     Communicated with: nurseMariangel prior to session.  Patient found HOB elevated with PureWick upon OT entry to room.    General Precautions: Standard, fall    Orthopedic Precautions:LLE non weight bearing  Braces: N/A  Respiratory Status: Room air     Occupational Performance:     Bed Mobility:    Patient completed Supine to Sit with contact guard assistance     Functional Mobility/Transfers:  Patient completed Sit <> Stand Transfer with minimum assistance  with  rolling walker   Functional Mobility: Hopped with RW and L LE NWB towards bathroom x20 steps with max cues for sequencing, technique, and min A of one for safety. +1 person for chair follow. Pt showing poor clearance of R foot and with difficulty to rely on B Ues onto RW to bear weight. Unable to reach toilet and terminate task for safety.     Activities of Daily Living:  Lower Body Dressing: stand by assistance to readjust socks while seated.   Pt declined other ADLs at this time.       [facility] 6 Click ADL: 16    Treatment & Education:  Pt educated on the following topics:  OT 's plan of care and purpose of visit, ADLs, importance of increased activity in hospital setting, transfer training, bed mobility, body mechanics, assistive device, modifications/compensatory strategies, sequencing, safety precautions, fall prevention, equipment recommendations, to call for assistance with call button, and weightbearing status.   Educated that RW is the safest option at this time and trialing other DME will be dependent on Pt's factors, progress, and deficits after awaiting further L LE surgery.   Understanding was verbalized, however additional teaching warranted.     Patient left up in chair with all lines " intact and call button in reach. Les reclined and L LE elevated with ice.     GOALS:   Multidisciplinary Problems       Occupational Therapy Goals          Problem: Occupational Therapy    Goal Priority Disciplines Outcome Interventions   Occupational Therapy Goal     OT, PT/OT Progressing    Description: Goals to be met by: 7/6/2025     Patient will increase functional independence with ADLs by performing:    UE Dressing with Modified [place].  LE Dressing with Stand-by Assistance using appropriate AE as needed.  Grooming while standing at sink with Stand-by Assistance.  Toileting from 3-in-1 commode over toilet with Stand-by Assistance for hygiene and clothing management.   Supine to sit with Modified [place].  Step transfer with Stand-by Assistance with RW.  Toilet transfer to 3-in-1 commode over toilet with Stand-by Assistance with RW.                         DME Justifications:   Caity requires a commode for home use because she is confined to a single room.   Caity's mobility limitation cannot be sufficiently resolved by the use of a cane. Her functional mobility deficit can be sufficiently resolved with the use of a Rolling Walker. Patient's mobility limitation significantly impairs their ability to participate in one of more activities of daily living.  The use of a RW will significantly improve the patient's ability to participate in MRADLS and the patient will use it on regular basis in the home.    Time Tracking:     OT Date of Treatment: 06/24/25  OT Start Time: 1335  OT Stop Time: 1349  OT Total Time (min): 14 min    Billable Minutes:Self Care/Home Management 14    OT/LIONEL: OT     Number of LIONEL visits since last OT visit: 0    6/24/2025

## 2025-06-24 NOTE — PLAN OF CARE
Inpatient Upgrade Note    Caity Ashby has warranted treatment spanning two or more midnights of hospital level care for the management of Closed displaced trimalleolar fracture of left ankle. She continues to require daily labs, monitoring of vital signs, further evaluation by consultants, and neurovascular checks q 4 hrs. Her condition is also complicated by the following comorbidities: HTN, HLD, arthritis, anxiety/depression, hypothyroidism, and migraines.

## 2025-06-24 NOTE — ASSESSMENT & PLAN NOTE
Anemia is likely due to iron deficiency, suspect poor intake per  as UTD on C sscope/ pap as not longer requires as prev normal and age. No bleeding reprted  nor ddark stools. Most recent hemoglobin and hematocrit are listed below.  Recent Labs     06/22/25  0344 06/23/25  0320 06/24/25  0246   HGB 11.4* 10.8* 10.9*   HCT 34.0* 32.2* 32.6*     Plan  - Monitor serial CBC: Daily  - Transfuse PRBC if patient becomes hemodynamically unstable, symptomatic or H/H drops below 7/21.  - Patient has not received any PRBC transfusions to date  - Patient's anemia is currently stable  - iron started, recheck in 3 months

## 2025-06-24 NOTE — PROGRESS NOTES
[facility] Medicine  Progress Note    Patient Name: Caity Ashby  MRN: [id number]  Patient Class: IP- Inpatient   Admission Date: 6/21/2025  Length of Stay: 0 days  Attending Physician: Lisa Bloom MD  Primary Care Provider: Erasmo Jones MD        Subjective     Principal Problem:Closed displaced trimalleolar fracture of left ankle        HPI:  Caity Ashby is a 85 y.o. female with a PMHx of HTN, HLD, arthritis, anxiety/depression, hypothyroidism, and migraines who presented to the ED for obvious LLE deformity after a fall. Patient states that she was walking down her stairs when she accidentally missed the bottom step causing her foot to go under her as she fell to the ground. Patient denies LOC or head trauma. She had immediate severe pain and a noticeable deformity to her left ankle. Denies numbness/tingling in her LLE. Denies sustaining any additional injuries. She reports prior to the fall she was in her normal state of health and denies CP, SOB, cough, fever/chills, abdominal pain, N/V/D, or urinary complaints.    In the ED, hypertensive initially otherwise VSS, afebrile. CBC unremarkable. Na 135. K 3.1. Bicarb 20. Anion gap 17. Cr 1.3 (bl~1.1). Calcium 10.6. Xray L foot with fracture involving the proximal aspect of the 2nd metatarsal and possibly 3rd. No significant overlying edema, correlation with any focal tenderness is recommended as this could reflect irregular healing of prior fracture. X-ray L ankle revealed an avulsion fracture involving the distal aspect of the fibula noting displacement of the distal fracture fragment. There is posterior dislocation of the tibiotalar articulation.There is questionable fracture of the medial malleolus though not confirmed. Post reduction x-ray ankle revealing interval placement of overlying cast material, which limits fine bony and soft tissue detail. Improved alignment of ankle fracture and improved alignment of the  ankle joint. Suspected trimalleolar ankle fracture now demonstrates anatomic alignment. CT L ankle pending. Orthopedics consulted in the ED and performed reduction of left ankle and short leg splint applied. Recommend DVT ppx with ASA bid and plan for operative fixation in the coming weeks.The patient received 2g Ancef, 4mg Zofran, 50mcg Fentanyl x2 doses.     Overview/Hospital Course:  No notes on file    Interval history- ortho reports no surgery today so diet ordered. BP improved to 110s-120s, cont to hold BP meds. Her  reports her bP plummets easily when NPO as happened in the past when she had hysterectomy also so if she is made NPO again/when she is for surgical planning will plan to do IVF the night before to try to avoid this. Headaches and home imitrex ordered if needs. Iron low at 27 and b12/folate okay and discussed with them. She no longer gets colonoscopies as have always been normal and age, had hysterectomy hx and no pap smears anymore given age she reports. No vaginal or gi bleeding ever. Her  reports dietary intake can be poor and so suspect likely dietary intake as cause and so start iron BID now given this and would recheck in 3months. If not improving or any of above, would then consider  further diagnostic work ups for other causes. Her apeptite has been spotty here at times he reports still. No BM since before admit so dulcolax PO requested as that typically works at home for her. Plan for surgery here once tisues amenable. Per ortho staff note, if they go to surgerize and not ready then would have to consider ex fix as reduced in hard splint now and that is keeping reduced and if removed for surgery then would consider ex fix if they could not do the ORIF at that time if tissue was too swollen and her and her usband are aware. Will f/u ortho when timing may be for OR as will need placement after surgery and not safe to go home in between procedures. CM aware of plan as is   and patient          Review of patient's allergies indicates:   Allergen Reactions    Boniva [ibandronate] Other (See Comments)     SHOULDER PAIN    Demerol [meperidine]     Ramipril Swelling       No current facility-administered medications on file prior to encounter.     Current Outpatient Medications on File Prior to Encounter   Medication Sig    diclofenac (VOLTAREN) 75 MG EC tablet Take 75 mg by mouth 2 (two) times daily.    escitalopram oxalate (LEXAPRO) 10 MG tablet Take 10 mg by mouth.    estrogens, conjugated, (PREMARIN) 0.625 MG tablet Take 0.625 mg by mouth once daily.    finasteride (PROPECIA) 1 mg tablet Take 1 mg by mouth.     levothyroxine (SYNTHROID) 125 MCG tablet TAKE 1 TABLET BY MOUTH EVERY MORNING    losartan (COZAAR) 100 MG tablet TAKE 1 TABLET BY MOUTH EVERY DAY    meloxicam (MOBIC) 15 MG tablet Take 15 mg by mouth once daily.    sumatriptan (IMITREX) 100 MG tablet     atorvastatin (LIPITOR) 20 MG tablet Take 20 mg by mouth every evening.    EPINEPHrine (EPIPEN) 0.3 mg/0.3 mL AtIn Inject 0.3 mLs (0.3 mg total) into the muscle once. for 1 dose    hydroCHLOROthiazide (HYDRODIURIL) 12.5 MG Tab TAKE 1 TABLET BY MOUTH EVERY DAY    metaxalone (SKELAXIN) 800 MG tablet Take 800 mg by mouth 3 (three) times daily as needed for Pain.    valACYclovir (VALTREX) 500 MG tablet TAKE 1 TABLET BY MOUTH EVERY DAY     Family History    None       Tobacco Use    Smoking status: Never    Smokeless tobacco: Never   Substance and Sexual Activity    Alcohol use: Not Currently    Drug use: Never    Sexual activity: Yes     Partners: Male     Review of Systems   Constitutional:  Negative for appetite change, chills, fatigue and fever.   HENT:  Negative for congestion, rhinorrhea and sore throat.    Eyes:  Negative for photophobia and visual disturbance.   Respiratory:  Negative for cough, chest tightness and shortness of breath.    Cardiovascular:  Negative for chest pain and palpitations.   Gastrointestinal:  Negative  for abdominal distention, abdominal pain, constipation, diarrhea, nausea and vomiting.   Genitourinary:  Negative for difficulty urinating, dysuria and frequency.   Musculoskeletal:  Positive for arthralgias, gait problem, joint swelling and myalgias. Negative for neck pain.   Skin:  Positive for color change.   Neurological:  Negative for dizziness, syncope, weakness, numbness and headaches.   Psychiatric/Behavioral:  Negative for confusion and hallucinations. The patient is not nervous/anxious.      Objective:     Vital Signs (Most Recent):  Temp: 96.7 °F (35.9 °C) (06/24/25 1102)  Pulse: 63 (06/24/25 1102)  Resp: 17 (06/24/25 1102)  BP: (!) 120/56 (06/24/25 1102)  SpO2: 95 % (06/24/25 1102) Vital Signs (24h Range):  Temp:  [96.7 °F (35.9 °C)-98.8 °F (37.1 °C)] 96.7 °F (35.9 °C)  Pulse:  [63-70] 63  Resp:  [17-18] 17  SpO2:  [94 %-97 %] 95 %  BP: (106-129)/(49-63) 120/56     Weight: 60.8 kg (133 lb 15.9 oz)  Body mass index is 21.63 kg/m².     Physical Exam  Vitals and nursing note reviewed.   Constitutional:       General: She is not in acute distress.     Appearance: Normal appearance. She is not toxic-appearing or diaphoretic.      Comments:  at bedside   HENT:      Head: Normocephalic and atraumatic.      Mouth/Throat:      Mouth: Mucous membranes are moist.      Pharynx: Oropharynx is clear.   Eyes:      Extraocular Movements: Extraocular movements intact.      Pupils: Pupils are equal, round, and reactive to light.   Cardiovascular:      Rate and Rhythm: Normal rate and regular rhythm.      Heart sounds: Normal heart sounds.   Pulmonary:      Effort: Pulmonary effort is normal. No respiratory distress.      Breath sounds: Normal breath sounds. No wheezing.   Abdominal:      General: Abdomen is flat. There is no distension.      Palpations: Abdomen is soft.      Tenderness: There is no abdominal tenderness. There is no guarding.   Musculoskeletal:         General: Tenderness and signs of injury  present.      Cervical back: Normal range of motion.      Left ankle: Tenderness present.      Comments: LLE with short leg splint, able to wiggle toes. Toes slightly cool with cap refill of 2-3 seconds   Skin:     General: Skin is warm and dry.      Capillary Refill: Capillary refill takes 2 to 3 seconds.   Neurological:      General: No focal deficit present.      Mental Status: She is alert and oriented to person, place, and time. Mental status is at baseline.   Psychiatric:         Mood and Affect: Mood normal.         Behavior: Behavior normal.         Thought Content: Thought content normal.         Judgment: Judgment normal.              CRANIAL NERVES     CN III, IV, VI   Pupils are equal, round, and reactive to light.       Significant Labs: All pertinent labs within the past 24 hours have been reviewed.  CBC:   Recent Labs   Lab 06/23/25 0320 06/24/25  0246   WBC 8.39 8.32   HGB 10.8* 10.9*   HCT 32.2* 32.6*    215     CMP:   Recent Labs   Lab 06/23/25 0320 06/24/25  0246   * 137   K 3.8 3.8    104   CO2 25 24   GLU 88 82   BUN 28* 28*   CREATININE 1.2 1.0   CALCIUM 8.0* 8.2*   PROT 5.6* 5.6*   ALBUMIN 2.8* 2.7*   BILITOT 0.2 0.2   ALKPHOS 49 51   AST 23 22   ALT 8* 10   ANIONGAP 9 9       Significant Imaging: I have reviewed all pertinent imaging results/findings within the past 24 hours.  Imaging Results               CT Ankle (Including Hindfoot) Without Contrast Left (Final result)  Result time 06/22/25 03:31:00      Final result by Alvarez Sharpe MD (06/22/25 03:31:00)                   Impression:      Trimalleolar ankle fracture with near anatomic alignment.    Remote or subacute nondisplaced fracture of the 2nd metatarsal.    This report was flagged in Epic as abnormal.    Electronically signed by resident: Tito Bernard  Date:    06/22/2025  Time:    02:24    Electronically signed by: Alvarez Sharpe MD  Date:    06/22/2025  Time:    03:31               Narrative:     "EXAMINATION:  CT ANKLE (INCLUDING HINDFOOT) WITHOUT CONTRAST LEFT; CT 3D RENDERING ON AN INDEPENDENT WORKSTATION    CLINICAL HISTORY:  Fracture, ankle;; Fracture, ankle;fracture;    TECHNIQUE:  CT of the left ankle was performed without the administration of intravenous contrast. 3D reconstructed images were acquired by post processing for a better visualization of the anatomic structures, with concurrent supervision and archived for permanent records.    COMPARISON:  X-ray ankle 06/21/2025, x-ray foot 06/21/2025.    FINDINGS:  Again seen are acute mildly displaced comminuted fractures of the distal tibia and fibula.  Satisfactory alignment of the fracture fragments.  Remote or subacute appearing nondisplaced fracture of the proximal 2nd metatarsal.  Ankle joint effusion.  No other fractures or dislocation.  Generalized osteopenia.    There is extensive soft tissue stranding about the ankle.  Calcific atherosclerosis of the partially visualized lower extremity vessels.  Cast material overlies the lower extremity.                                       X-Ray Chest 1 View (Final result)  Result time 06/22/25 00:50:12      Final result by Alvarez Sharpe MD (06/22/25 00:50:12)                   Impression:      No acute finding identified on this limited single view.      Electronically signed by: Alvarez Sharpe MD  Date:    06/22/2025  Time:    00:50               Narrative:    EXAMINATION:  XR CHEST 1 VIEW    CLINICAL HISTORY:  Provided history is "pre op eval;  ".    TECHNIQUE:  One view of the chest.    COMPARISON:  11/28/2025.    FINDINGS:  Nonspecific prominent linear metallic radiodensities overlie the chest and mediastinum.  Cardiomediastinal silhouette is not enlarged.  There are coarse interstitial lung markings but no large focal area of consolidation.  No large pleural effusion.  No pneumothorax.                                       X-Ray Ankle Complete Left (Final result)  Result time 06/21/25 " 23:24:03      Final result by Alvarez Sharpe MD (06/21/25 23:24:03)                   Impression:      Near anatomic alignment of previously seen ankle fracture/dislocation.      Electronically signed by: Alvarez Sharpe MD  Date:    06/21/2025  Time:    23:24               Narrative:    EXAMINATION:  XR ANKLE COMPLETE 3 VIEW LEFT    CLINICAL HISTORY:  Unspecified fall, initial encounter    TECHNIQUE:  AP, lateral and oblique views of the left ankle were performed.    COMPARISON:  06/21/2025.    FINDINGS:  Interval placement of overlying cast material, which limits fine bony and soft tissue detail.  Improved alignment of ankle fracture and improved alignment of the ankle joint.  Suspected trimalleolar ankle fracture now demonstrates anatomic alignment.  No new acute displaced fracture.  No new dislocation.  Soft tissue edema about the ankle.  No unexpected radiopaque foreign body.                                       X-Ray Tibia Fibula 2 View Left (Final result)  Result time 06/21/25 18:15:43      Final result by Benito Waldron MD (06/21/25 18:15:43)                   Impression:      1. Distal fibular fracture noting tibiotalar dislocation.  Reimaging following closed reduction is recommended for more detailed evaluation of the distal tibia to definitively exclude fracture.      Electronically signed by: Benito Waldron MD  Date:    06/21/2025  Time:    18:15               Narrative:    EXAMINATION:  XR TIBIA FIBULA 2 VIEW LEFT; XR ANKLE COMPLETE 3 VIEW LEFT    CLINICAL HISTORY:  Unspecified fall, initial encounter    TECHNIQUE:  AP and lateral views of the left tibia and fibula were performed.  Three views left ankle.    COMPARISON:  None.    FINDINGS:  Four views left tibia fibula, three views left ankle.    There is avulsion fracture involving the distal aspect of the fibula noting displacement of the distal fracture fragment.  There is posterior dislocation of the tibiotalar articulation.  There is  questionable fracture of the medial malleolus though not confirmed.  Reimaging is recommended following closed reduction to definitively exclude fracture.  There are degenerative changes of the knee.  There is osteopenia.  There is edema about the lower leg and ankle.                                       X-Ray Foot Complete Left (Final result)  Result time 06/21/25 18:15:08      Final result by Benito Waldron MD (06/21/25 18:15:08)                   Impression:      1. There is fracture involving the proximal aspect of the 2nd metatarsal and possibly 3rd.  No significant overlying edema, correlation with any focal tenderness is recommended as this could reflect irregular healing of prior fracture.  Please see separately dictated report for details of the distal tibia and fibula.      Electronically signed by: Benito Waldron MD  Date:    06/21/2025  Time:    18:15               Narrative:    EXAMINATION:  XR FOOT COMPLETE 3 VIEW LEFT    CLINICAL HISTORY:  .  Unspecified fall, initial encounter    TECHNIQUE:  AP, lateral and oblique views of the left foot were performed.    COMPARISON:  None    FINDINGS:  Two views left foot.    No acute displaced fracture or dislocates cracks that there is osteopenia.  There are degenerative changes of the foot.  There is fracture involving the proximal aspect of the 2nd metatarsal and possibly 3rd metatarsal.  No significant edema along the dorsal aspect of the foot.  Please see separately dictated report for details of the ankle.                                       X-Ray Ankle Complete Left (Final result)  Result time 06/21/25 18:15:43      Final result by Benito Waldron MD (06/21/25 18:15:43)                   Impression:      1. Distal fibular fracture noting tibiotalar dislocation.  Reimaging following closed reduction is recommended for more detailed evaluation of the distal tibia to definitively exclude fracture.      Electronically signed by: Benito Waldron  MD  Date:    06/21/2025  Time:    18:15               Narrative:    EXAMINATION:  XR TIBIA FIBULA 2 VIEW LEFT; XR ANKLE COMPLETE 3 VIEW LEFT    CLINICAL HISTORY:  Unspecified fall, initial encounter    TECHNIQUE:  AP and lateral views of the left tibia and fibula were performed.  Three views left ankle.    COMPARISON:  None.    FINDINGS:  Four views left tibia fibula, three views left ankle.    There is avulsion fracture involving the distal aspect of the fibula noting displacement of the distal fracture fragment.  There is posterior dislocation of the tibiotalar articulation.  There is questionable fracture of the medial malleolus though not confirmed.  Reimaging is recommended following closed reduction to definitively exclude fracture.  There are degenerative changes of the knee.  There is osteopenia.  There is edema about the lower leg and ankle.                                          Assessment & Plan  Closed displaced trimalleolar fracture of left ankle  Pt is a 85 y.o. female who presents after a fall at home after missing a step resulting in a left trimalleolar ankle fracture .    -Initial x-ray ankle reveals an avulsion fracture involving the distal aspect of the fibula noting displacement of the distal fracture fragment. There is posterior dislocation of the tibiotalar articulation. There is questionable fracture of the medial malleolus though not confirmed.   -Ortho consulted in the ED and able to doppler left DP pulse and performed L ankle reduction w/ splinting  -Post reduction x-ray ankle reveals interval placement of overlying cast material, which limits fine bony and soft tissue detail. Improved alignment of ankle fracture and improved alignment of the ankle joint. Suspected trimalleolar ankle fracture now demonstrates anatomic alignment.   -CT Left Ankle :Trimalleolar ankle fracture with near anatomic alignment.  -MM pain management  -Ice and keep ankle elevated. Has hard splint so at some point  will either need a window or to take down per ortho discretion to reassess swelling.  -NWB LLE  -Per ortho DVT ppx: ASA 81mg bid x4 weeks   -PT/OT recs for high intensity therapy, RW/ BSC, gilesower chair  -ortho at bedside 6/22 reports plan is for surgery in the next week once swelling better and at this point plan it to wait for OR in house and then pursue post acute stay after unless ortho plans change,  and CM aware  Ortho reports not ready for OR 6/24 so diet ordered. If go to take to OR and not ready then may have to switch to ex fix per staff attestation as reduced in splint now and if removed to check tissues then would have to possible place in that to keep reduced if wasn't ready for ORIF. Will f/u further op pending  -given gest lower BPS when is NPO per , would ensure gets IVF if NPO the night before.   -Q4 Neurovascular Checks  -Fall Precautions  Hypokalemia  Patient's most recent potassium results are listed below.   Recent Labs     06/22/25  0344 06/23/25  0320 06/24/25  0246   K 3.5 3.8 3.8     Plan  - Replete potassium per protocol  - Monitor potassium Daily  - Patient's hypokalemia is stable  Hypertension  Patients blood pressure range in the last 24 hours was: BP  Min: 90/49  Max: 190/81.The patient's inpatient anti-hypertensive regimen is listed below:  Current Antihypertensives       Plan  - hypotensive 6/22 so BP meds held and bolused IVF and improving to 110s now.  going to work on intake and BP stable 6/24 but cont to hold meds  Hypothyroidism  Patient has chronic hypothyroidism. TFTs reviewed-   Lab Results   Component Value Date    TSH 2.778 01/21/2023     -Will continue chronic levothyroxine and adjust for and clinical changes.  Primary osteoarthritis of left knee  -History noted.  -Pt on celebrex and PRN voltaren and skelaxin at home.  -Pain management as above.  Hyperlipidemia   Patient is chronically on statin.will continue for now. Monitor clinically. Last LDL was    Lab Results   Component Value Date    LDLCALC 94 12/16/2020      Stage 3 chronic kidney disease  No previous diagnosis in chart but GFR appears to have been 40-50 since 2020.  Creatine stable for now. BMP reviewed- noted Estimated Creatinine Clearance: 39.9 mL/min (based on SCr of 1 mg/dL). according to latest data. Based on current GFR, CKD stage is stage 3 - GFR 30-59.  Monitor UOP and serial BMP and adjust therapy as needed. Renally dose meds. Avoid nephrotoxic medications and procedures.  Migraines  -History noted. No acute issues.  -PRN sumitriptan.  Anxiety and depression  Patient has recurrent depression which is unknown and is currently controlled. Will Continue anti-depressant medications. We will not consult psychiatry at this time. Patient does not display psychosis at this time. Continue to monitor closely and adjust plan of care as needed.  -Continue lexapro.  Multiple closed fractures of metatarsal bone of left foot  -2nd metatarsal fx seen and questionable 3rd MT fx but CT showing appears to likely be only 2nd MT    Hypercalcemia  Hypercalcemia is likely due to likely volume depletion on admit and improved after IVF. The patient has the following symptoms due to their hypercalcemia: None (asymptomatic). Their most recent calcium and albumin results are listed below.  Recent Labs     06/22/25  0344 06/23/25  0320 06/24/25  0246   CALCIUM 8.9 8.0* 8.2*   ALBUMIN 3.0* 2.8* 2.7*     Plan  - Obtain the following labs to work up the cause of hypercalcemia: none.   - Treat the hypercalcemia with: IVF bolus.   - The patient's hypercalcemia is resolved.     Osteopenia  -seen on imaging and associated with current fracture    Macrocytic anemia  Anemia is likely due to Iron deficiency. Most recent hemoglobin and hematocrit are listed below.  Recent Labs     06/22/25  0344 06/23/25  0320 06/24/25  0246   HGB 11.4* 10.8* 10.9*   HCT 34.0* 32.2* 32.6*     Plan  - Monitor serial CBC: Daily  - Transfuse PRBC if  patient becomes hemodynamically unstable, symptomatic or H/H drops below 7/21.  - Patient has not received any PRBC transfusions to date  - Patient's anemia is currently stable    Iron deficiency anemia  Anemia is likely due to iron deficiency, suspect poor intake per  as UTD on C sscope/ pap as not longer requires as prev normal and age. No bleeding reprted  nor ddark stools. Most recent hemoglobin and hematocrit are listed below.  Recent Labs     06/22/25  0344 06/23/25  0320 06/24/25  0246   HGB 11.4* 10.8* 10.9*   HCT 34.0* 32.2* 32.6*     Plan  - Monitor serial CBC: Daily  - Transfuse PRBC if patient becomes hemodynamically unstable, symptomatic or H/H drops below 7/21.  - Patient has not received any PRBC transfusions to date  - Patient's anemia is currently stable  - iron started, recheck in 3 months  Constipation  On bowel regimen, present prior to admit  -dulcolaz [place] added as they report has worked before    Chronic headaches  Home imitrex PRN    VTE Risk Mitigation (From admission, onward)           Ordered     IP VTE HIGH RISK PATIENT  Once         06/21/25 2305     Place sequential compression device  Until discontinued         06/21/25 2305                    Discharge Planning   MANINDER: 6/27/2025     Code Status: Full Code   Medical Readiness for Discharge Date:   Discharge Plan A: Skilled Nursing Facility                        Lisa Bloom MD  Department of Hospital Medicine   [facility] - Surgery

## 2025-06-24 NOTE — ASSESSMENT & PLAN NOTE
[employer] [facility] will call the patient on Saturday October 13,2018 @ 9:00 a.m. Dr. Lopez Persons office stated the patient needed to call and schedule the appointment. Added to AVS. Brunilda Pan CM Specialist  
 
 Patients blood pressure range in the last 24 hours was: BP  Min: 90/49  Max: 190/81.The patient's inpatient anti-hypertensive regimen is listed below:  Current Antihypertensives       Plan  - hypotensive 6/22 so BP meds held and bolused IVF and improving to 110s now.  going to work on intake and BP stable 6/24 but cont to hold meds

## 2025-06-24 NOTE — ASSESSMENT & PLAN NOTE
Patient's most recent potassium results are listed below.   Recent Labs     06/22/25  0344 06/23/25  0320 06/24/25  0246   K 3.5 3.8 3.8     Plan  - Replete potassium per protocol  - Monitor potassium Daily  - Patient's hypokalemia is stable

## 2025-06-24 NOTE — SUBJECTIVE & OBJECTIVE
"Principal Problem:Closed displaced trimalleolar fracture of left ankle    Principal Orthopedic Problem:  As above    Interval History:  No acute events overnight.  Afebrile, hemodynamically stable.  Pain is well-controlled.  Left lower extremity has been iced and elevated and short-leg splint.  NPO since midnight in preparation for possible surgery today.    Review of patient's allergies indicates:   Allergen Reactions    Boniva [ibandronate] Other (See Comments)     SHOULDER PAIN    Demerol [meperidine]     Ramipril Swelling       Current Facility-Administered Medications   Medication    acetaminophen tablet 1,000 mg    aspirin EC tablet 81 mg    atorvastatin tablet 20 mg    bisacodyL EC tablet 10 mg    dextrose 50% injection 12.5 g    dextrose 50% injection 25 g    EScitalopram oxalate tablet 10 mg    ferrous sulfate tablet 1 each    glucagon (human recombinant) injection 1 mg    glucose chewable tablet 16 g    glucose chewable tablet 24 g    levothyroxine tablet 125 mcg    melatonin tablet 6 mg    methocarbamoL tablet 500 mg    morphine injection 2 mg    naloxone 0.4 mg/mL injection 0.02 mg    ondansetron injection 4 mg    oxyCODONE immediate release tablet 5 mg    oxyCODONE immediate release tablet Tab 10 mg    polyethylene glycol packet 17 g    pregabalin capsule 50 mg    senna-docusate 8.6-50 mg per tablet 1 tablet    sodium chloride 0.9% flush 10 mL    sumatriptan tablet 100 mg     Objective:     Vital Signs (Most Recent):  Temp: 96.7 °F (35.9 °C) (06/24/25 1102)  Pulse: 63 (06/24/25 1102)  Resp: 17 (06/24/25 1102)  BP: (!) 120/56 (06/24/25 1102)  SpO2: 95 % (06/24/25 1102) Vital Signs (24h Range):  Temp:  [96.7 °F (35.9 °C)-98.8 °F (37.1 °C)] 96.7 °F (35.9 °C)  Pulse:  [63-70] 63  Resp:  [17-18] 17  SpO2:  [94 %-97 %] 95 %  BP: (106-129)/(49-63) 120/56     Weight: 60.8 kg (133 lb 15.9 oz)  Height: 5' 6" (167.6 cm)  Body mass index is 21.63 kg/m².    No intake or output data in the 24 hours ending 06/24/25 " 1405     Ortho/SPM Exam  NAD, resting comfortably in bed  A&O x3   Breathing comfortably w/o distress   Extremities WWP     LLE:   Short-leg splint in place.  Splint iced and elevated.    Splint windowed, there is a small serous blister of the anterior aspect of the distal tibia.  The skin of the anterior and lateral aspect of the distal leg do wrinkle but only slightly.    She is still moderately swollen.    Sensation intact to light touch throughout.    Wiggles all toes.     Significant Labs:   Recent Lab Results         06/24/25  0246        Albumin 2.7       ALP 51       ALT 10       Anion Gap 9       AST 22       Baso # 0.06       Basophil % 0.7       BILIRUBIN TOTAL 0.2  Comment: For infants and newborns, interpretation of results should be based   on gestational age, weight and in agreement with clinical   observations.    Premature Infant recommended reference ranges:   0-24 hours:  <8.0 mg/dL   24-48 hours: <12.0 mg/dL   3-5 days:    <15.0 mg/dL   6-29 days:   <15.0 mg/dL       BUN 28       Calcium 8.2       Chloride 104       CO2 24       Creatinine 1.0       eGFR 56  Comment: Estimated GFR calculated using the CKD-EPI creatinine (2021) equation.       Eos # 0.59       Eos % 7.1       Ferritin 27.0       Folate 7.7       Glucose 82       Gran # (ANC) 5.12       Hematocrit 32.6       Hemoglobin 10.9       Immature Grans (Abs) 0.02  Comment: Mild elevation in immature granulocytes is non specific and can be seen in a variety of conditions including stress response, acute inflammation, trauma and pregnancy. Correlation with other laboratory and clinical findings is essential.       Immature Granulocytes 0.2       Lymph # 1.72       Lymph % 20.7       Magnesium  2.1       MCH 34.0       MCHC 33.4              Mono # 0.81       Mono % 9.7       MPV 11.4       Neut % 61.6       nRBC 0       Platelet Count 215       Potassium 3.8       PROTEIN TOTAL 5.6       RBC 3.21       RDW 12.3       Sodium 137        Vitamin B12 729       WBC 8.32             All pertinent labs within the past 24 hours have been reviewed.    Significant Imaging: I have reviewed all pertinent imaging results/findings.

## 2025-06-24 NOTE — ASSESSMENT & PLAN NOTE
Pt is a 85 y.o. female who presents after a fall at home after missing a step resulting in a left trimalleolar ankle fracture .    -Initial x-ray ankle reveals an avulsion fracture involving the distal aspect of the fibula noting displacement of the distal fracture fragment. There is posterior dislocation of the tibiotalar articulation. There is questionable fracture of the medial malleolus though not confirmed.   -Ortho consulted in the ED and able to doppler left DP pulse and performed L ankle reduction w/ splinting  -Post reduction x-ray ankle reveals interval placement of overlying cast material, which limits fine bony and soft tissue detail. Improved alignment of ankle fracture and improved alignment of the ankle joint. Suspected trimalleolar ankle fracture now demonstrates anatomic alignment.   -CT Left Ankle :Trimalleolar ankle fracture with near anatomic alignment.  -MM pain management  -Ice and keep ankle elevated. Has hard splint so at some point will either need a window or to take down per ortho discretion to reassess swelling.  -NWB LLE  -Per ortho DVT ppx: ASA 81mg bid x4 weeks   -PT/OT recs for high intensity therapy, RW/ BSC, schower chair  -ortho at bedside 6/22 reports plan is for surgery in the next week once swelling better and at this point plan it to wait for OR in house and then pursue post acute stay after unless ortho plans change,  and CM aware  Ortho reports not ready for OR 6/24 so diet ordered. If go to take to OR and not ready then may have to switch to ex fix per staff attestation as reduced in splint now and if removed to check tissues then would have to possible place in that to keep reduced if wasn't ready for ORIF. Will f/u further op pending  -given gest lower BPS when is NPO per , would ensure gets IVF if NPO the night before.   -Q4 Neurovascular Checks  -Fall Precautions

## 2025-06-25 PROBLEM — N18.31 STAGE 3A CHRONIC KIDNEY DISEASE: Status: ACTIVE | Noted: 2025-06-22

## 2025-06-25 PROBLEM — E87.6 HYPOKALEMIA: Status: RESOLVED | Noted: 2025-06-21 | Resolved: 2025-06-25

## 2025-06-25 PROBLEM — K59.00 CONSTIPATION: Status: RESOLVED | Noted: 2025-06-24 | Resolved: 2025-06-25

## 2025-06-25 PROBLEM — E83.52 HYPERCALCEMIA: Status: RESOLVED | Noted: 2025-06-22 | Resolved: 2025-06-25

## 2025-06-25 PROBLEM — M80.072D: Status: ACTIVE | Noted: 2025-06-22

## 2025-06-25 PROBLEM — E78.00 PURE HYPERCHOLESTEROLEMIA: Status: ACTIVE | Noted: 2025-06-22

## 2025-06-25 LAB
ABSOLUTE EOSINOPHIL (OHS): 0.37 K/UL
ABSOLUTE MONOCYTE (OHS): 0.95 K/UL (ref 0.3–1)
ABSOLUTE NEUTROPHIL COUNT (OHS): 7.74 K/UL (ref 1.8–7.7)
ALBUMIN SERPL BCP-MCNC: 2.8 G/DL (ref 3.5–5.2)
ALP SERPL-CCNC: 55 UNIT/L (ref 40–150)
ALT SERPL W/O P-5'-P-CCNC: 17 UNIT/L (ref 10–44)
ANION GAP (OHS): 7 MMOL/L (ref 8–16)
AST SERPL-CCNC: 33 UNIT/L (ref 11–45)
BASOPHILS # BLD AUTO: 0.06 K/UL
BASOPHILS NFR BLD AUTO: 0.6 %
BILIRUB SERPL-MCNC: 0.2 MG/DL (ref 0.1–1)
BUN SERPL-MCNC: 21 MG/DL (ref 8–23)
CALCIUM SERPL-MCNC: 8.2 MG/DL (ref 8.7–10.5)
CHLORIDE SERPL-SCNC: 103 MMOL/L (ref 95–110)
CO2 SERPL-SCNC: 25 MMOL/L (ref 23–29)
CREAT SERPL-MCNC: 0.9 MG/DL (ref 0.5–1.4)
ERYTHROCYTE [DISTWIDTH] IN BLOOD BY AUTOMATED COUNT: 12.4 % (ref 11.5–14.5)
GFR SERPLBLD CREATININE-BSD FMLA CKD-EPI: >60 ML/MIN/1.73/M2
GLUCOSE SERPL-MCNC: 91 MG/DL (ref 70–110)
HCT VFR BLD AUTO: 33.8 % (ref 37–48.5)
HGB BLD-MCNC: 11.2 GM/DL (ref 12–16)
IMM GRANULOCYTES # BLD AUTO: 0.03 K/UL (ref 0–0.04)
IMM GRANULOCYTES NFR BLD AUTO: 0.3 % (ref 0–0.5)
LYMPHOCYTES # BLD AUTO: 1.44 K/UL (ref 1–4.8)
MAGNESIUM SERPL-MCNC: 2.3 MG/DL (ref 1.6–2.6)
MCH RBC QN AUTO: 33.7 PG (ref 27–31)
MCHC RBC AUTO-ENTMCNC: 33.1 G/DL (ref 32–36)
MCV RBC AUTO: 102 FL (ref 82–98)
NUCLEATED RBC (/100WBC) (OHS): 0 /100 WBC
PLATELET # BLD AUTO: 209 K/UL (ref 150–450)
PMV BLD AUTO: 10.8 FL (ref 9.2–12.9)
POTASSIUM SERPL-SCNC: 3.7 MMOL/L (ref 3.5–5.1)
PROT SERPL-MCNC: 5.7 GM/DL (ref 6–8.4)
RBC # BLD AUTO: 3.32 M/UL (ref 4–5.4)
RELATIVE EOSINOPHIL (OHS): 3.5 %
RELATIVE LYMPHOCYTE (OHS): 13.6 % (ref 18–48)
RELATIVE MONOCYTE (OHS): 9 % (ref 4–15)
RELATIVE NEUTROPHIL (OHS): 73 % (ref 38–73)
SODIUM SERPL-SCNC: 135 MMOL/L (ref 136–145)
WBC # BLD AUTO: 10.59 K/UL (ref 3.9–12.7)

## 2025-06-25 PROCEDURE — 97116 GAIT TRAINING THERAPY: CPT

## 2025-06-25 PROCEDURE — 25000003 PHARM REV CODE 250: Performed by: NURSE PRACTITIONER

## 2025-06-25 PROCEDURE — 97535 SELF CARE MNGMENT TRAINING: CPT

## 2025-06-25 PROCEDURE — 25000003 PHARM REV CODE 250: Performed by: HOSPITALIST

## 2025-06-25 PROCEDURE — 80053 COMPREHEN METABOLIC PANEL: CPT | Performed by: HOSPITALIST

## 2025-06-25 PROCEDURE — 83735 ASSAY OF MAGNESIUM: CPT | Performed by: HOSPITALIST

## 2025-06-25 PROCEDURE — 11000001 HC ACUTE MED/SURG PRIVATE ROOM

## 2025-06-25 PROCEDURE — 36415 COLL VENOUS BLD VENIPUNCTURE: CPT | Performed by: HOSPITALIST

## 2025-06-25 PROCEDURE — 85025 COMPLETE CBC W/AUTO DIFF WBC: CPT | Performed by: HOSPITALIST

## 2025-06-25 PROCEDURE — 63600175 PHARM REV CODE 636 W HCPCS: Performed by: NURSE PRACTITIONER

## 2025-06-25 PROCEDURE — 97530 THERAPEUTIC ACTIVITIES: CPT

## 2025-06-25 RX ADMIN — ACETAMINOPHEN 1000 MG: 500 TABLET ORAL at 09:06

## 2025-06-25 RX ADMIN — METHOCARBAMOL 500 MG: 500 TABLET ORAL at 09:06

## 2025-06-25 RX ADMIN — FERROUS SULFATE TAB EC 325 MG (65 MG FE EQUIVALENT) 1 EACH: 325 (65 FE) TABLET DELAYED RESPONSE at 08:06

## 2025-06-25 RX ADMIN — METHOCARBAMOL 500 MG: 500 TABLET ORAL at 08:06

## 2025-06-25 RX ADMIN — SODIUM CHLORIDE: 9 INJECTION, SOLUTION INTRAVENOUS at 05:06

## 2025-06-25 RX ADMIN — Medication 6 MG: at 09:06

## 2025-06-25 RX ADMIN — PREGABALIN 50 MG: 50 CAPSULE ORAL at 08:06

## 2025-06-25 RX ADMIN — ESCITALOPRAM OXALATE 10 MG: 10 TABLET ORAL at 08:06

## 2025-06-25 RX ADMIN — PREGABALIN 50 MG: 50 CAPSULE ORAL at 09:06

## 2025-06-25 RX ADMIN — ATORVASTATIN CALCIUM 20 MG: 20 TABLET, FILM COATED ORAL at 09:06

## 2025-06-25 RX ADMIN — METHOCARBAMOL 500 MG: 500 TABLET ORAL at 02:06

## 2025-06-25 RX ADMIN — SENNOSIDES AND DOCUSATE SODIUM 1 TABLET: 50; 8.6 TABLET ORAL at 08:06

## 2025-06-25 RX ADMIN — SENNOSIDES AND DOCUSATE SODIUM 1 TABLET: 50; 8.6 TABLET ORAL at 09:06

## 2025-06-25 RX ADMIN — ASPIRIN 81 MG: 81 TABLET, COATED ORAL at 09:06

## 2025-06-25 RX ADMIN — LEVOTHYROXINE SODIUM 125 MCG: 0.12 TABLET ORAL at 05:06

## 2025-06-25 RX ADMIN — FERROUS SULFATE TAB EC 325 MG (65 MG FE EQUIVALENT) 1 EACH: 325 (65 FE) TABLET DELAYED RESPONSE at 09:06

## 2025-06-25 RX ADMIN — OXYCODONE 5 MG: 5 TABLET ORAL at 09:06

## 2025-06-25 RX ADMIN — ASPIRIN 81 MG: 81 TABLET, COATED ORAL at 08:06

## 2025-06-25 RX ADMIN — ONDANSETRON 4 MG: 2 INJECTION INTRAMUSCULAR; INTRAVENOUS at 05:06

## 2025-06-25 RX ADMIN — ACETAMINOPHEN 1000 MG: 500 TABLET ORAL at 05:06

## 2025-06-25 RX ADMIN — ACETAMINOPHEN 1000 MG: 500 TABLET ORAL at 02:06

## 2025-06-25 NOTE — ASSESSMENT & PLAN NOTE
Patient's most recent potassium results are listed below.   Recent Labs     06/23/25  0320 06/24/25  0246 06/25/25  0732   K 3.8 3.8 3.7     Plan  - Replete potassium per protocol  - Monitor potassium Daily  - Patient's hypokalemia is stable

## 2025-06-25 NOTE — ASSESSMENT & PLAN NOTE
Patients blood pressure range in the last 24 hours was: BP  Min: 90/49  Max: 190/81.The patient's inpatient anti-hypertensive regimen is listed below:  Current Antihypertensives       Plan  - hypotensive 6/22 so BP meds held and bolused IVF and improving to 110s now.  going to work on intake and BP stable 6/24 but cont to hold meds

## 2025-06-25 NOTE — ASSESSMENT & PLAN NOTE
Pt is a 85 y.o. female who presents after a fall at home after missing a step resulting in a left trimalleolar ankle fracture .    -Initial x-ray ankle reveals an avulsion fracture involving the distal aspect of the fibula noting displacement of the distal fracture fragment. There is posterior dislocation of the tibiotalar articulation. There is questionable fracture of the medial malleolus though not confirmed.   -Ortho consulted in the ED and able to doppler left DP pulse and performed L ankle reduction w/ splinting  -Post reduction x-ray ankle reveals interval placement of overlying cast material, which limits fine bony and soft tissue detail. Improved alignment of ankle fracture and improved alignment of the ankle joint. Suspected trimalleolar ankle fracture now demonstrates anatomic alignment.   -CT Left Ankle :Trimalleolar ankle fracture with near anatomic alignment.  -MM pain management  -Ice and keep ankle elevated. Has hard splint so to know for sure swelling changes theyd to take down per ortho discretion to reassess swelling but if takes down and isnt ready would need ex fix as its keeping reduced so difficult assessments  -NWB LLE  -Per ortho DVT ppx: ASA 81mg bid x4 weeks   -PT/OT recs for high intensity therapy, RW/ BSC, schower chair  -ortho at bedside 6/22 reports plan is for surgery in the next week once swelling better and at this point plan it to wait for OR in house and then pursue post acute stay after unless ortho plans change,  and CM aware  Ortho reports not ready for OR today, family said they told them potentially Friday.   -given gest lower BPS when is NPO per , would ensure gets IVF if NPO the night before. I told  to remind dr mcdonald also about this for possible OR friday  -Q4 Neurovascular Checks  -Fall Precautions

## 2025-06-25 NOTE — ASSESSMENT & PLAN NOTE
Anemia is likely due to Iron deficiency. Most recent hemoglobin and hematocrit are listed below.  Recent Labs     06/23/25  0320 06/24/25  0246 06/25/25  0733   HGB 10.8* 10.9* 11.2*   HCT 32.2* 32.6* 33.8*     Plan  - Monitor serial CBC: Daily  - Transfuse PRBC if patient becomes hemodynamically unstable, symptomatic or H/H drops below 7/21.  - Patient has not received any PRBC transfusions to date  - Patient's anemia is currently stable

## 2025-06-25 NOTE — SUBJECTIVE & OBJECTIVE
Interval history- seen this morning and no surgery today per ortho and they reported ortho said that they were planning to try for Friday. IVF ran overnight as her suband said she gets low BPs when NPO and had stopped this AM as only put on for 12 hours and they had leaked into bed so I let  know she needed sheets changed and bath per  this morning. Would put on IVF thusrday night when NPO if  would like as I told him today I could put them on to start Thursday night myself since dr mcdonald is new tomorrow but he said to hold off. I told him to make sure he reminds her given its her first day tomorrow if he wants her to be on IVF Thursday if made NPO for Friday morning as well but BP is doing well now so may not need, 120s-140s systolic. Holding home bp meds given they report she can drop low when NPO and has droppped low initially on admit first day or 2. Good BSM yesterday- multiple, so holding miralax/dulcolax and kept senna on per discussion. Pain controlled. Recs for placemen once surgery done as home unsafe for him to bring home until rehabilitated further. Has home imitrex if needs for migraines. No new issues this morning, in good spirits.          Review of patient's allergies indicates:   Allergen Reactions    Boniva [ibandronate] Other (See Comments)     SHOULDER PAIN    Demerol [meperidine]     Ramipril Swelling       No current facility-administered medications on file prior to encounter.     Current Outpatient Medications on File Prior to Encounter   Medication Sig    diclofenac (VOLTAREN) 75 MG EC tablet Take 75 mg by mouth 2 (two) times daily.    escitalopram oxalate (LEXAPRO) 10 MG tablet Take 10 mg by mouth.    estrogens, conjugated, (PREMARIN) 0.625 MG tablet Take 0.625 mg by mouth once daily.    finasteride (PROPECIA) 1 mg tablet Take 1 mg by mouth.     levothyroxine (SYNTHROID) 125 MCG tablet TAKE 1 TABLET BY MOUTH EVERY MORNING    losartan (COZAAR) 100 MG tablet TAKE 1  TABLET BY MOUTH EVERY DAY    meloxicam (MOBIC) 15 MG tablet Take 15 mg by mouth once daily.    sumatriptan (IMITREX) 100 MG tablet     atorvastatin (LIPITOR) 20 MG tablet Take 20 mg by mouth every evening.    EPINEPHrine (EPIPEN) 0.3 mg/0.3 mL AtIn Inject 0.3 mLs (0.3 mg total) into the muscle once. for 1 dose    hydroCHLOROthiazide (HYDRODIURIL) 12.5 MG Tab TAKE 1 TABLET BY MOUTH EVERY DAY    metaxalone (SKELAXIN) 800 MG tablet Take 800 mg by mouth 3 (three) times daily as needed for Pain.    valACYclovir (VALTREX) 500 MG tablet TAKE 1 TABLET BY MOUTH EVERY DAY     Family History    None       Tobacco Use    Smoking status: Never    Smokeless tobacco: Never   Substance and Sexual Activity    Alcohol use: Not Currently    Drug use: Never    Sexual activity: Yes     Partners: Male     Review of Systems   Constitutional:  Negative for appetite change, chills, fatigue and fever.   HENT:  Negative for congestion, rhinorrhea and sore throat.    Eyes:  Negative for photophobia and visual disturbance.   Respiratory:  Negative for cough, chest tightness and shortness of breath.    Cardiovascular:  Negative for chest pain and palpitations.   Gastrointestinal:  Negative for abdominal distention, abdominal pain, constipation, diarrhea, nausea and vomiting.   Genitourinary:  Negative for difficulty urinating, dysuria and frequency.   Musculoskeletal:  Positive for arthralgias, gait problem, joint swelling and myalgias. Negative for neck pain.   Skin:  Positive for color change.   Neurological:  Negative for dizziness, syncope, weakness, numbness and headaches.   Psychiatric/Behavioral:  Negative for confusion and hallucinations. The patient is not nervous/anxious.      Objective:     Vital Signs (Most Recent):  Temp: 97.5 °F (36.4 °C) (06/25/25 0730)  Pulse: 61 (06/25/25 0730)  Resp: 15 (06/25/25 0730)  BP: (!) 147/67 (06/25/25 0730)  SpO2: (!) 94 % (06/25/25 0730) Vital Signs (24h Range):  Temp:  [96.6 °F (35.9 °C)-97.8 °F  (36.6 °C)] 97.5 °F (36.4 °C)  Pulse:  [61-72] 61  Resp:  [15-18] 15  SpO2:  [92 %-96 %] 94 %  BP: (109-147)/(55-67) 147/67     Weight: 60.8 kg (133 lb 15.9 oz)  Body mass index is 21.63 kg/m².     Physical Exam  Vitals and nursing note reviewed.   Constitutional:       General: She is not in acute distress.     Appearance: Normal appearance. She is not toxic-appearing or diaphoretic.      Comments:  at bedside   HENT:      Head: Normocephalic and atraumatic.      Mouth/Throat:      Mouth: Mucous membranes are moist.      Pharynx: Oropharynx is clear.   Eyes:      Extraocular Movements: Extraocular movements intact.      Pupils: Pupils are equal, round, and reactive to light.   Cardiovascular:      Rate and Rhythm: Normal rate and regular rhythm.      Heart sounds: Normal heart sounds.   Pulmonary:      Effort: Pulmonary effort is normal. No respiratory distress.      Breath sounds: Normal breath sounds. No wheezing.   Abdominal:      General: Abdomen is flat. There is no distension.      Palpations: Abdomen is soft.      Tenderness: There is no abdominal tenderness. There is no guarding.   Musculoskeletal:         General: Tenderness and signs of injury present.      Cervical back: Normal range of motion.      Left ankle: Tenderness present.      Comments: LLE with short leg splint, able to wiggle toes. Toes slightly cool with cap refill of 2-3 seconds   Skin:     General: Skin is warm and dry.      Capillary Refill: Capillary refill takes 2 to 3 seconds.   Neurological:      General: No focal deficit present.      Mental Status: She is alert and oriented to person, place, and time. Mental status is at baseline.   Psychiatric:         Mood and Affect: Mood normal.         Behavior: Behavior normal.         Thought Content: Thought content normal.         Judgment: Judgment normal.              CRANIAL NERVES     CN III, IV, VI   Pupils are equal, round, and reactive to light.       Significant Labs: All  pertinent labs within the past 24 hours have been reviewed.  CBC:   Recent Labs   Lab 06/24/25  0246 06/25/25  0733   WBC 8.32 10.59   HGB 10.9* 11.2*   HCT 32.6* 33.8*    209     CMP:   Recent Labs   Lab 06/24/25  0246 06/25/25  0732    135*   K 3.8 3.7    103   CO2 24 25   GLU 82 91   BUN 28* 21   CREATININE 1.0 0.9   CALCIUM 8.2* 8.2*   PROT 5.6* 5.7*   ALBUMIN 2.7* 2.8*   BILITOT 0.2 0.2   ALKPHOS 51 55   AST 22 33   ALT 10 17   ANIONGAP 9 7*       Significant Imaging: I have reviewed all pertinent imaging results/findings within the past 24 hours.  Imaging Results               CT Ankle (Including Hindfoot) Without Contrast Left (Final result)  Result time 06/22/25 03:31:00      Final result by Alvarez Sharpe MD (06/22/25 03:31:00)                   Impression:      Trimalleolar ankle fracture with near anatomic alignment.    Remote or subacute nondisplaced fracture of the 2nd metatarsal.    This report was flagged in Epic as abnormal.    Electronically signed by resident: Tito Bernard  Date:    06/22/2025  Time:    02:24    Electronically signed by: Alvarez Sharpe MD  Date:    06/22/2025  Time:    03:31               Narrative:    EXAMINATION:  CT ANKLE (INCLUDING HINDFOOT) WITHOUT CONTRAST LEFT; CT 3D RENDERING ON AN INDEPENDENT WORKSTATION    CLINICAL HISTORY:  Fracture, ankle;; Fracture, ankle;fracture;    TECHNIQUE:  CT of the left ankle was performed without the administration of intravenous contrast. 3D reconstructed images were acquired by post processing for a better visualization of the anatomic structures, with concurrent supervision and archived for permanent records.    COMPARISON:  X-ray ankle 06/21/2025, x-ray foot 06/21/2025.    FINDINGS:  Again seen are acute mildly displaced comminuted fractures of the distal tibia and fibula.  Satisfactory alignment of the fracture fragments.  Remote or subacute appearing nondisplaced fracture of the proximal 2nd metatarsal.   "Ankle joint effusion.  No other fractures or dislocation.  Generalized osteopenia.    There is extensive soft tissue stranding about the ankle.  Calcific atherosclerosis of the partially visualized lower extremity vessels.  Cast material overlies the lower extremity.                                       X-Ray Chest 1 View (Final result)  Result time 06/22/25 00:50:12      Final result by Alvarez Sharpe MD (06/22/25 00:50:12)                   Impression:      No acute finding identified on this limited single view.      Electronically signed by: Alvarez Sharpe MD  Date:    06/22/2025  Time:    00:50               Narrative:    EXAMINATION:  XR CHEST 1 VIEW    CLINICAL HISTORY:  Provided history is "pre op eval;  ".    TECHNIQUE:  One view of the chest.    COMPARISON:  11/28/2025.    FINDINGS:  Nonspecific prominent linear metallic radiodensities overlie the chest and mediastinum.  Cardiomediastinal silhouette is not enlarged.  There are coarse interstitial lung markings but no large focal area of consolidation.  No large pleural effusion.  No pneumothorax.                                       X-Ray Ankle Complete Left (Final result)  Result time 06/21/25 23:24:03      Final result by Alvarez Sharpe MD (06/21/25 23:24:03)                   Impression:      Near anatomic alignment of previously seen ankle fracture/dislocation.      Electronically signed by: Alvarez Sharpe MD  Date:    06/21/2025  Time:    23:24               Narrative:    EXAMINATION:  XR ANKLE COMPLETE 3 VIEW LEFT    CLINICAL HISTORY:  Unspecified fall, initial encounter    TECHNIQUE:  AP, lateral and oblique views of the left ankle were performed.    COMPARISON:  06/21/2025.    FINDINGS:  Interval placement of overlying cast material, which limits fine bony and soft tissue detail.  Improved alignment of ankle fracture and improved alignment of the ankle joint.  Suspected trimalleolar ankle fracture now demonstrates anatomic alignment.  " No new acute displaced fracture.  No new dislocation.  Soft tissue edema about the ankle.  No unexpected radiopaque foreign body.                                       X-Ray Tibia Fibula 2 View Left (Final result)  Result time 06/21/25 18:15:43      Final result by Benito Waldron MD (06/21/25 18:15:43)                   Impression:      1. Distal fibular fracture noting tibiotalar dislocation.  Reimaging following closed reduction is recommended for more detailed evaluation of the distal tibia to definitively exclude fracture.      Electronically signed by: Benito Waldron MD  Date:    06/21/2025  Time:    18:15               Narrative:    EXAMINATION:  XR TIBIA FIBULA 2 VIEW LEFT; XR ANKLE COMPLETE 3 VIEW LEFT    CLINICAL HISTORY:  Unspecified fall, initial encounter    TECHNIQUE:  AP and lateral views of the left tibia and fibula were performed.  Three views left ankle.    COMPARISON:  None.    FINDINGS:  Four views left tibia fibula, three views left ankle.    There is avulsion fracture involving the distal aspect of the fibula noting displacement of the distal fracture fragment.  There is posterior dislocation of the tibiotalar articulation.  There is questionable fracture of the medial malleolus though not confirmed.  Reimaging is recommended following closed reduction to definitively exclude fracture.  There are degenerative changes of the knee.  There is osteopenia.  There is edema about the lower leg and ankle.                                       X-Ray Foot Complete Left (Final result)  Result time 06/21/25 18:15:08      Final result by Benito Waldron MD (06/21/25 18:15:08)                   Impression:      1. There is fracture involving the proximal aspect of the 2nd metatarsal and possibly 3rd.  No significant overlying edema, correlation with any focal tenderness is recommended as this could reflect irregular healing of prior fracture.  Please see separately dictated report for details of the  distal tibia and fibula.      Electronically signed by: Benito Waldron MD  Date:    06/21/2025  Time:    18:15               Narrative:    EXAMINATION:  XR FOOT COMPLETE 3 VIEW LEFT    CLINICAL HISTORY:  .  Unspecified fall, initial encounter    TECHNIQUE:  AP, lateral and oblique views of the left foot were performed.    COMPARISON:  None    FINDINGS:  Two views left foot.    No acute displaced fracture or dislocates cracks that there is osteopenia.  There are degenerative changes of the foot.  There is fracture involving the proximal aspect of the 2nd metatarsal and possibly 3rd metatarsal.  No significant edema along the dorsal aspect of the foot.  Please see separately dictated report for details of the ankle.                                       X-Ray Ankle Complete Left (Final result)  Result time 06/21/25 18:15:43      Final result by Benito Wadlron MD (06/21/25 18:15:43)                   Impression:      1. Distal fibular fracture noting tibiotalar dislocation.  Reimaging following closed reduction is recommended for more detailed evaluation of the distal tibia to definitively exclude fracture.      Electronically signed by: Benito Waldron MD  Date:    06/21/2025  Time:    18:15               Narrative:    EXAMINATION:  XR TIBIA FIBULA 2 VIEW LEFT; XR ANKLE COMPLETE 3 VIEW LEFT    CLINICAL HISTORY:  Unspecified fall, initial encounter    TECHNIQUE:  AP and lateral views of the left tibia and fibula were performed.  Three views left ankle.    COMPARISON:  None.    FINDINGS:  Four views left tibia fibula, three views left ankle.    There is avulsion fracture involving the distal aspect of the fibula noting displacement of the distal fracture fragment.  There is posterior dislocation of the tibiotalar articulation.  There is questionable fracture of the medial malleolus though not confirmed.  Reimaging is recommended following closed reduction to definitively exclude fracture.  There are degenerative  changes of the knee.  There is osteopenia.  There is edema about the lower leg and ankle.

## 2025-06-25 NOTE — ASSESSMENT & PLAN NOTE
Hypercalcemia is likely due to likely volume depletion on admit and improved after IVF. The patient has the following symptoms due to their hypercalcemia: None (asymptomatic). Their most recent calcium and albumin results are listed below.  Recent Labs     06/23/25  0320 06/24/25  0246 06/25/25  0732   CALCIUM 8.0* 8.2* 8.2*   ALBUMIN 2.8* 2.7* 2.8*     Plan  - Obtain the following labs to work up the cause of hypercalcemia: none.   - Treat the hypercalcemia with: IVF bolus.   - The patient's hypercalcemia is resolved.

## 2025-06-25 NOTE — PT/OT/SLP PROGRESS
"Physical Therapy Co-Treatment    Patient Name:  Caity Ashby   MRN:  [id number]    Recommendations:     Discharge Recommendations: High Intensity Therapy  Discharge Equipment Recommendations: bedside commode, walker, rolling, wheelchair, shower chair (elevated L leg rest)  Barriers to discharge: Pt currently requiring increased level of assistance with mobility      Assessment:     Caity Ashby is a 85 y.o. female admitted with a medical diagnosis of Closed displaced trimalleolar fracture of left ankle.  She presents with the following impairments/functional limitations: impaired endurance, weakness, gait instability, impaired balance, decreased lower extremity function.    Pleasant and motivated. Pt demonstrated improved activity tolerance and amb distance of 15' RW with 1 person for safety; demonstrated compliance with L LE NWB. In the bathroom, pt attempted to turn, and had LOB posteriorly, required assist to regain balance. Pt continues to benefit from skilled PT services while in house in order to address the aforementioned deficits.      Rehab Prognosis: Good; patient would benefit from acute skilled PT services to address these deficits and reach maximum level of function.    Recent Surgery: * No surgery found *      Plan:     During this hospitalization, patient to be seen 4 x/week to address the identified rehab impairments via gait training, therapeutic activities, therapeutic exercises, neuromuscular re-education and progress toward the following goals:    Plan of Care Expires:  07/22/25    Subjective     "I love skin care"    Pain/Comfort:  Pain Rating 1: 0/10      Objective:     Communicated with RN prior to session.  Patient found HOB elevated with cryotherapy, PureWick, SCD upon PT entry to room.     General Precautions: Standard, fall  Orthopedic Precautions: LLE non weight bearing  Braces: N/A  Respiratory Status: Room air     Functional Mobility:  Bed Mobility:     Scooting: stand by " assistance  Supine to Sit: stand by assistance  Transfers:     Sit to Stand:  contact guard assistance with rolling walker  Gait: pt amb 15' RW CGA and presented with head down, hop to gait, compliant with NWB precaution; pt had 1 LOB posteriorly, required modA  to regain balance.  Stairs: ascended/descended 1 threshold entrance RW Raf  Balance:   Good sitting balance  Fair standing balance      AM-PAC 6 CLICK MOBILITY  Turning over in bed (including adjusting bedclothes, sheets and blankets)?: 4  Sitting down on and standing up from a chair with arms (e.g., wheelchair, bedside commode, etc.): 3  Moving from lying on back to sitting on the side of the bed?: 4  Moving to and from a bed to a chair (including a wheelchair)?: 3  Need to walk in hospital room?: 3  Climbing 3-5 steps with a railing?: 2  Basic Mobility Total Score: 19       Treatment & Education:  Discussed WB precautions  Static stand at sink  Discussed energy conservation, breathing techniques, and pacing activities    Educated pt on PT role/POC  Educated pt on importance of OOB activity and daily ambulation   Pt educated on proper body mechanics, safety techniques, and energy conservation with PT facilitation and cueing throughout session   Pt verbalized understanding      Patient left up in chair with all lines intact, call button in reach, RN notified, and OT present..    GOALS:   Multidisciplinary Problems       Physical Therapy Goals          Problem: Physical Therapy    Goal Priority Disciplines Outcome Interventions   Physical Therapy Goal     PT, PT/OT     Description: Goals to be met by: 25     Patient will increase functional independence with mobility by performin. Supine to sit with Set-up [place]  2. Sit to supine with Contact Guard Assistance  3. Sit to stand transfer with Stand-by Assistance using LRAD as appropriate  4. Bed to chair transfer with Contact Guard Assistance using LRAD as appropriate  5. Gait  x 50 feet  with Contact Guard Assistance using LRAD as appropriate.   6. Ascend/Descend 4 inch curb step with Minimal Assistance using LRAD as appropriate.  7. Stand for 8 minutes with Stand-by Assistance using LRAD as appropriate  8. Lower extremity exercise program x30 reps per handout, with assistance as needed    DME Justifications (see above for complete DME recommendations)    Bedside Commode- Patient has a mobility limitation that significantly impairs their ability to participate in one or more mobility related activities of daily living, including toileting. This deficit can be resolved by using a bedside commode. Patient demonstrates mobility limitations that will cause them to be confined to one room at home without bathroom access for up to 30 days. Using a bedside commode will greatly improve the patient's ability to participate in MRADLs.     Rolling Walker- Patient demonstrates a mobility limitation that significantly impairs their ability to participate in one or more mobility related activities of daily living. Patient's mobility limitation cannot be sufficiently resolved with the use of a cane, but can be sufficiently resolved with the use of a rolling walker.The use of a rolling walker will considerably improve their ability to participate in MRADLs. Patient will use the walker on a regular basis at home.                         Time Tracking:     PT Received On: 06/25/25  PT Start Time: 1125     PT Stop Time: 1150  PT Total Time (min): 25 min     Billable Minutes: Gait Training 15 and Neuromuscular Re-education 10    Co-treatment performed due to patient's multiple deficits requiring two skilled therapists to appropriately and safely assess patient's strength and endurance while facilitating functional tasks in addition to accommodating for patient's activity tolerance.          Treatment Type: Treatment  PT/PTA: PT     Number of PTA visits since last PT visit: 0     06/25/2025

## 2025-06-25 NOTE — ASSESSMENT & PLAN NOTE
Caity Ashby is a 85 y.o. female with a left trimalleolar ankle fracture.  Presented closed, neurovascularly intact, and without any other musculoskeletal injuries on physical exam.  Closed reduction was performed in the emergency department.  The patient was admitted given concern for her to safely remain nonweightbearing at home.    06/24/2025:  Patient is seen and examined at the bedside this morning.  Her splint is windowed; swelling is still present in her skin only barely wrinkles.  She is not yet amenable to definitive fixation.  She may have a diet today.  Surgery pending soft tissues.     Plan:   - NWB to the LLE  - DVT ppx: ASA 81mg bid  - Aggressively ice and elevate the LLE on a stack of sheets at all times   - Multimodal pain control, limiting narcotics.  - NPO midnight

## 2025-06-25 NOTE — ASSESSMENT & PLAN NOTE
On bowel regimen, present prior to admit  -dulcolaz [place] added as they report has worked before and successful with multile BMS on 6/24  Go to daily senna given above and if need to scale up again will do so

## 2025-06-25 NOTE — PLAN OF CARE
No significant events during this shift. Pt VSS and afebrile throughout shift. Pt free of skin breakdown. Pt dressing is CDI.  No complaints of pain this shift. Pt has been eating and voiding adequately throughout shift. Pt has call light in reach, nonskid socks on. Pt sitting in chair in no distress. Purposeful rounding was preformed during this shift.      Problem: Adult Inpatient Plan of Care  Goal: Plan of Care Review  Outcome: Progressing  Goal: Patient-Specific Goal (Individualized)  Outcome: Progressing  Goal: Absence of Hospital-Acquired Illness or Injury  Outcome: Progressing  Goal: Optimal Comfort and Wellbeing  Outcome: Progressing  Goal: Readiness for Transition of Care  Outcome: Progressing     Problem: Skin Injury Risk Increased  Goal: Skin Health and Integrity  Outcome: Progressing     Problem: Fall Injury Risk  Goal: Absence of Fall and Fall-Related Injury  Outcome: Progressing     Problem: Fatigue  Goal: Improved Activity Tolerance  Outcome: Progressing     Problem: Infection  Goal: Absence of Infection Signs and Symptoms  Outcome: Progressing

## 2025-06-25 NOTE — ASSESSMENT & PLAN NOTE
Anemia is likely due to iron deficiency, suspect poor intake per  as UTD on C sscope/ pap as not longer requires as prev normal and age. No bleeding reprted  nor ddark stools. Most recent hemoglobin and hematocrit are listed below.  Recent Labs     06/23/25  0320 06/24/25  0246 06/25/25  0733   HGB 10.8* 10.9* 11.2*   HCT 32.2* 32.6* 33.8*     Plan  - Monitor serial CBC: Daily  - Transfuse PRBC if patient becomes hemodynamically unstable, symptomatic or H/H drops below 7/21.  - Patient has not received any PRBC transfusions to date  - Patient's anemia is currently stable  - iron started, recheck in 3 months

## 2025-06-25 NOTE — PT/OT/SLP PROGRESS
Occupational Therapy   Co-Treatment  Co-treatment performed due to patient's multiple deficits requiring two skilled therapists to appropriately and safely assess patient's strength and endurance while facilitating functional tasks in addition to accommodating for patient's activity tolerance.      Name: Caity Ashby  MRN: [id number]  Admitting Diagnosis:  Closed displaced trimalleolar fracture of left ankle       Recommendations:     Discharge Recommendations: High Intensity Therapy  Discharge Equipment Recommendations:  bedside commode, walker, rolling, wheelchair, shower chair, other (see comments) (w/c with elevating leg rest L LE)  Barriers to discharge:  Decreased caregiver support    Assessment:     Caity Ashby is a 85 y.o. female with a medical diagnosis of Closed displaced trimalleolar fracture of left ankle.  She presents with good participation and motivation. Pt had 1 LOB posteriorly requiring Mod (A) to steady. Verbal cues provided for safety with transfers. Performance deficits affecting function are weakness, impaired endurance, impaired self care skills, impaired functional mobility, gait instability, impaired balance, decreased coordination, decreased upper extremity function, decreased lower extremity function, decreased safety awareness, pain, impaired coordination, orthopedic precautions, edema. Patient has demonstrated sufficient progression to warrant high intensity therapy evidenced by objectives noted below.     Rehab Prognosis:  Good; patient would benefit from acute skilled OT services to address these deficits and reach maximum level of function.       Plan:     Patient to be seen 4 x/week to address the above listed problems via self-care/home management, therapeutic activities, therapeutic exercises, neuromuscular re-education  Plan of Care Expires: 07/22/25  Plan of Care Reviewed with: patient    Subjective     Chief Complaint: being NWB  Patient/Family Comments/goals:  to have her sx and go to [facility]  Pain/Comfort:  Pain Rating 1: 0/10  Location - Side 1: Left  Location - Orientation 1: generalized  Location 1: ankle  Pain Addressed 1: Pre-medicate for activity, Reposition, Distraction, Cessation of Activity  Pain Rating Post-Intervention 1: other (see comments) (pt did not rate)    Objective:   Additional staff present: Rae PT    Communicated with: RN prior to session.  Patient found HOB elevated with cryotherapy, FCD, PureWick upon OT entry to room.    General Precautions: Standard, fall    Orthopedic Precautions:LLE non weight bearing  Braces: N/A  Respiratory Status: Room air     Occupational Performance:     Bed Mobility:    Patient completed Scooting/Bridging with stand by assistance  Patient completed Supine to Sit with stand by assistance     Functional Mobility/Transfers:  Patient completed Sit <> Stand Transfer with contact guard assistance  with  rolling walker   Functional Mobility: Pt engaged in functional mobility to simulate household/community distances 15 ft with CGA and RW in order to maximize functional endurance and standing balance required for engagement in occupations of choice   1 LOB posteriorly when turning to the R requiring Mod (A) to recover  Cues for RW management and NWB  Chair follow present    Activities of Daily Living:  Grooming: contact guard assistance for functional balance/endurance as pt completed hand hygiene while standing at sink using RW  Upper Body Dressing: stand by assistance to don gown over back EOB  Lower Body Dressing: moderate assistance to don R shoe sitting [facility] 6 Click ADL: 16    Treatment & Education:  -Education on weightbearing precautions  -Education on energy conservation and task modification to maximize safety and (I) during ADLs and mobility  -Education on importance of OOB activity to improve overall activity tolerance and promote recovery  -Pt educated to call for assistance and to transfer with hospital  staff only  -Provided education regarding role of OT, POC, & discharge recommendations with pt verbalizing understanding.  Pt had no further questions & when asked whether there were any concerns pt reported none.     Patient left up in chair with all lines intact, call button in reach, RN notified, and PCT present    GOALS:   Multidisciplinary Problems       Occupational Therapy Goals          Problem: Occupational Therapy    Goal Priority Disciplines Outcome Interventions   Occupational Therapy Goal     OT, PT/OT Progressing    Description: Goals to be met by: 7/6/2025     Patient will increase functional independence with ADLs by performing:    UE Dressing with Modified [place].  LE Dressing with Stand-by Assistance using appropriate AE as needed.  Grooming while standing at sink with Stand-by Assistance.  Toileting from 3-in-1 commode over toilet with Stand-by Assistance for hygiene and clothing management.   Supine to sit with Modified [place].  Step transfer with Stand-by Assistance with RW.  Toilet transfer to 3-in-1 commode over toilet with Stand-by Assistance with RW.                         DME Justifications:   Caity requires a commode for home use because she is confined to a single room.  Caity Ashby has a mobility limitation that significantly impairs her ability to participate in one or more mobility related activities of daily living (MRADLs) such as toileting, feeding, dressing, grooming, and bathing in customary locations in the home.  The mobility limitation cannot be sufficiently resolved by the use of a cane or walker.   The use of a manual wheelchair will significantly improve the patients ability to participate in MRADLS and the patient will use it on regular basis in the home.  Caity Ashby has expressed her willingness to use a manual wheelchair in the home. Patients upper body strength is sufficient for propulsion.  She also has a caregiver who is available,  willing, and able to provide assistance with the wheelchair when needed.     Caity's mobility limitation cannot be sufficiently resolved by the use of a cane. Her functional mobility deficit can be sufficiently resolved with the use of a Rolling Walker. Patient's mobility limitation significantly impairs their ability to participate in one of more activities of daily living.  The use of a RW will significantly improve the patient's ability to participate in MRADLS and the patient will use it on regular basis in the home.    Time Tracking:     OT Date of Treatment: 06/25/25  OT Start Time: 1125  OT Stop Time: 1150  OT Total Time (min): 25 min    Billable Minutes:Self Care/Home Management 15  Therapeutic Activity 10    OT/LIONEL: OT     Number of LIONEL visits since last OT visit: 0    6/25/2025

## 2025-06-25 NOTE — ASSESSMENT & PLAN NOTE
No previous diagnosis in chart but GFR appears to have been 40-50 since 2020.  Creatine stable for now. BMP reviewed- noted Estimated Creatinine Clearance: 44.3 mL/min (based on SCr of 0.9 mg/dL). according to latest data. Based on current GFR, CKD stage is stage 3 - GFR 30-59.  Monitor UOP and serial BMP and adjust therapy as needed. Renally dose meds. Avoid nephrotoxic medications and procedures.

## 2025-06-25 NOTE — PROGRESS NOTES
[facility] - [facility]  Orthopedics  Progress Note    Patient Name: Caity Ashby  MRN: [id number]  Admission Date: 6/21/2025  Hospital Length of Stay: 1 days  Attending Provider: Lisa Bloom MD  Primary Care Provider: Erasmo Jones MD    Subjective:     Principal Problem:Closed displaced trimalleolar fracture of left ankle    Principal Orthopedic Problem:  As above    Interval History:  No acute events overnight.  Afebrile, hemodynamically stable.  Pain is well-controlled.  Left lower extremity has been iced and elevated and short-leg splint.  NPO since midnight in preparation for possible surgery today.    Review of patient's allergies indicates:   Allergen Reactions    Boniva [ibandronate] Other (See Comments)     SHOULDER PAIN    Demerol [meperidine]     Ramipril Swelling       Current Facility-Administered Medications   Medication    0.9% NaCl infusion    acetaminophen tablet 1,000 mg    aspirin EC tablet 81 mg    atorvastatin tablet 20 mg    dextrose 50% injection 12.5 g    dextrose 50% injection 25 g    EScitalopram oxalate tablet 10 mg    ferrous sulfate tablet 1 each    glucagon (human recombinant) injection 1 mg    glucose chewable tablet 16 g    glucose chewable tablet 24 g    levothyroxine tablet 125 mcg    melatonin tablet 6 mg    methocarbamoL tablet 500 mg    morphine injection 2 mg    naloxone 0.4 mg/mL injection 0.02 mg    ondansetron injection 4 mg    oxyCODONE immediate release tablet 5 mg    oxyCODONE immediate release tablet Tab 10 mg    pregabalin capsule 50 mg    senna-docusate 8.6-50 mg per tablet 1 tablet    sodium chloride 0.9% flush 10 mL    sumatriptan tablet 100 mg     Objective:     Vital Signs (Most Recent):  Temp: 97.5 °F (36.4 °C) (06/25/25 0730)  Pulse: 61 (06/25/25 0730)  Resp: 15 (06/25/25 0730)  BP: (!) 147/67 (06/25/25 0730)  SpO2: (!) 94 % (06/25/25 0730) Vital Signs (24h Range):  Temp:  [96.6 °F (35.9 °C)-97.8 °F (36.6 °C)] 97.5 °F (36.4 °C)  Pulse:  [61-72]  "61  Resp:  [15-18] 15  SpO2:  [92 %-96 %] 94 %  BP: (109-147)/(55-67) 147/67     Weight: 60.8 kg (133 lb 15.9 oz)  Height: 5' 6" (167.6 cm)  Body mass index is 21.63 kg/m².      Intake/Output Summary (Last 24 hours) at 6/25/2025 0755  Last data filed at 6/25/2025 0554  Gross per 24 hour   Intake 400 ml   Output 754 ml   Net -354 ml        Ortho/SPM Exam  NAD, resting comfortably in bed  A&O x3   Breathing comfortably w/o distress   Extremities WWP     LLE:   Short-leg splint in place.  Splint iced and elevated.    Splint windowed, there is a serous blister over the anterior aspect of the distal tibia.  The skin of the anterior and lateral aspect of the distal leg do wrinkle but only slightly.    She is still moderately swollen.    Sensation intact to light touch throughout.    Wiggles all toes.     Significant Labs:   Recent Lab Results       None          All pertinent labs within the past 24 hours have been reviewed.    Significant Imaging: I have reviewed all pertinent imaging results/findings.  Assessment/Plan:     * Closed displaced trimalleolar fracture of left ankle  Caity Ashby is a 85 y.o. female with a left trimalleolar ankle fracture.  Presented closed, neurovascularly intact, and without any other musculoskeletal injuries on physical exam.  Closed reduction was performed in the emergency department.  The patient was admitted given concern for her to safely remain nonweightbearing at home.    06/24/2025:  Patient is seen and examined at the bedside this morning.  Her splint is windowed; swelling is still present in her skin only barely wrinkles.  She is not yet amenable to definitive fixation.  She may have a diet today.  Surgery pending soft tissues.     Plan:   - NWB to the LLE  - DVT ppx: ASA 81mg bid  - Aggressively ice and elevate the LLE on a stack of sheets at all times   - Multimodal pain control, limiting narcotics.  - NPO midnight           NADIRA Forrester MD  Orthopedics  [facility] - " Surgery

## 2025-06-25 NOTE — PROGRESS NOTES
[facility]  Progress Note    Patient Name: Caity Ashby  MRN: [id number]  Patient Class: IP- Inpatient   Admission Date: 6/21/2025  Length of Stay: 1 days  Attending Physician: Lisa Bloom MD  Primary Care Provider: Erasmo Jones MD        Subjective     Principal Problem:Closed displaced trimalleolar fracture of left ankle        HPI:  Caity Ashby is a 85 y.o. female with a PMHx of HTN, HLD, arthritis, anxiety/depression, hypothyroidism, and migraines who presented to the ED for obvious LLE deformity after a fall. Patient states that she was walking down her stairs when she accidentally missed the bottom step causing her foot to go under her as she fell to the ground. Patient denies LOC or head trauma. She had immediate severe pain and a noticeable deformity to her left ankle. Denies numbness/tingling in her LLE. Denies sustaining any additional injuries. She reports prior to the fall she was in her normal state of health and denies CP, SOB, cough, fever/chills, abdominal pain, N/V/D, or urinary complaints.    In the ED, hypertensive initially otherwise VSS, afebrile. CBC unremarkable. Na 135. K 3.1. Bicarb 20. Anion gap 17. Cr 1.3 (bl~1.1). Calcium 10.6. Xray L foot with fracture involving the proximal aspect of the 2nd metatarsal and possibly 3rd. No significant overlying edema, correlation with any focal tenderness is recommended as this could reflect irregular healing of prior fracture. X-ray L ankle revealed an avulsion fracture involving the distal aspect of the fibula noting displacement of the distal fracture fragment. There is posterior dislocation of the tibiotalar articulation.There is questionable fracture of the medial malleolus though not confirmed. Post reduction x-ray ankle revealing interval placement of overlying cast material, which limits fine bony and soft tissue detail. Improved alignment of ankle fracture and improved alignment of the  ankle joint. Suspected trimalleolar ankle fracture now demonstrates anatomic alignment. CT L ankle pending. Orthopedics consulted in the ED and performed reduction of left ankle and short leg splint applied. Recommend DVT ppx with ASA bid and plan for operative fixation in the coming weeks.The patient received 2g Ancef, 4mg Zofran, 50mcg Fentanyl x2 doses.     Overview/Hospital Course:  No notes on file    Interval history- seen this morning and no surgery today per ortho and they reported ortho said that they were planning to try for Friday. IVF ran overnight as her suband said she gets low BPs when NPO and had stopped this AM as only put on for 12 hours and they had leaked into bed so I let  know she needed sheets changed and bath per  this morning. Would put on IVF thusrday night when NPO if  would like as I told him today I could put them on to start Thursday night myself since dr mcdonald is new tomorrow but he said to hold off. I told him to make sure he reminds her given its her first day tomorrow if he wants her to be on IVF Thursday if made NPO for Friday morning as well but BP is doing well now so may not need, 120s-140s systolic. Holding home bp meds given they report she can drop low when NPO and has droppped low initially on admit first day or 2. Good BSM yesterday- multiple, so holding miralax/dulcolax and kept senna on per discussion. Pain controlled. Recs for placemen once surgery done as home unsafe for him to bring home until rehabilitated further. Has home imitrex if needs for migraines. No new issues this morning, in good spirits.          Review of patient's allergies indicates:   Allergen Reactions    Boniva [ibandronate] Other (See Comments)     SHOULDER PAIN    Demerol [meperidine]     Ramipril Swelling       No current facility-administered medications on file prior to encounter.     Current Outpatient Medications on File Prior to Encounter   Medication Sig     diclofenac (VOLTAREN) 75 MG EC tablet Take 75 mg by mouth 2 (two) times daily.    escitalopram oxalate (LEXAPRO) 10 MG tablet Take 10 mg by mouth.    estrogens, conjugated, (PREMARIN) 0.625 MG tablet Take 0.625 mg by mouth once daily.    finasteride (PROPECIA) 1 mg tablet Take 1 mg by mouth.     levothyroxine (SYNTHROID) 125 MCG tablet TAKE 1 TABLET BY MOUTH EVERY MORNING    losartan (COZAAR) 100 MG tablet TAKE 1 TABLET BY MOUTH EVERY DAY    meloxicam (MOBIC) 15 MG tablet Take 15 mg by mouth once daily.    sumatriptan (IMITREX) 100 MG tablet     atorvastatin (LIPITOR) 20 MG tablet Take 20 mg by mouth every evening.    EPINEPHrine (EPIPEN) 0.3 mg/0.3 mL AtIn Inject 0.3 mLs (0.3 mg total) into the muscle once. for 1 dose    hydroCHLOROthiazide (HYDRODIURIL) 12.5 MG Tab TAKE 1 TABLET BY MOUTH EVERY DAY    metaxalone (SKELAXIN) 800 MG tablet Take 800 mg by mouth 3 (three) times daily as needed for Pain.    valACYclovir (VALTREX) 500 MG tablet TAKE 1 TABLET BY MOUTH EVERY DAY     Family History    None       Tobacco Use    Smoking status: Never    Smokeless tobacco: Never   Substance and Sexual Activity    Alcohol use: Not Currently    Drug use: Never    Sexual activity: Yes     Partners: Male     Review of Systems   Constitutional:  Negative for appetite change, chills, fatigue and fever.   HENT:  Negative for congestion, rhinorrhea and sore throat.    Eyes:  Negative for photophobia and visual disturbance.   Respiratory:  Negative for cough, chest tightness and shortness of breath.    Cardiovascular:  Negative for chest pain and palpitations.   Gastrointestinal:  Negative for abdominal distention, abdominal pain, constipation, diarrhea, nausea and vomiting.   Genitourinary:  Negative for difficulty urinating, dysuria and frequency.   Musculoskeletal:  Positive for arthralgias, gait problem, joint swelling and myalgias. Negative for neck pain.   Skin:  Positive for color change.   Neurological:  Negative for dizziness,  syncope, weakness, numbness and headaches.   Psychiatric/Behavioral:  Negative for confusion and hallucinations. The patient is not nervous/anxious.      Objective:     Vital Signs (Most Recent):  Temp: 97.5 °F (36.4 °C) (06/25/25 0730)  Pulse: 61 (06/25/25 0730)  Resp: 15 (06/25/25 0730)  BP: (!) 147/67 (06/25/25 0730)  SpO2: (!) 94 % (06/25/25 0730) Vital Signs (24h Range):  Temp:  [96.6 °F (35.9 °C)-97.8 °F (36.6 °C)] 97.5 °F (36.4 °C)  Pulse:  [61-72] 61  Resp:  [15-18] 15  SpO2:  [92 %-96 %] 94 %  BP: (109-147)/(55-67) 147/67     Weight: 60.8 kg (133 lb 15.9 oz)  Body mass index is 21.63 kg/m².     Physical Exam  Vitals and nursing note reviewed.   Constitutional:       General: She is not in acute distress.     Appearance: Normal appearance. She is not toxic-appearing or diaphoretic.      Comments:  at bedside   HENT:      Head: Normocephalic and atraumatic.      Mouth/Throat:      Mouth: Mucous membranes are moist.      Pharynx: Oropharynx is clear.   Eyes:      Extraocular Movements: Extraocular movements intact.      Pupils: Pupils are equal, round, and reactive to light.   Cardiovascular:      Rate and Rhythm: Normal rate and regular rhythm.      Heart sounds: Normal heart sounds.   Pulmonary:      Effort: Pulmonary effort is normal. No respiratory distress.      Breath sounds: Normal breath sounds. No wheezing.   Abdominal:      General: Abdomen is flat. There is no distension.      Palpations: Abdomen is soft.      Tenderness: There is no abdominal tenderness. There is no guarding.   Musculoskeletal:         General: Tenderness and signs of injury present.      Cervical back: Normal range of motion.      Left ankle: Tenderness present.      Comments: LLE with short leg splint, able to wiggle toes. Toes slightly cool with cap refill of 2-3 seconds   Skin:     General: Skin is warm and dry.      Capillary Refill: Capillary refill takes 2 to 3 seconds.   Neurological:      General: No focal deficit  present.      Mental Status: She is alert and oriented to person, place, and time. Mental status is at baseline.   Psychiatric:         Mood and Affect: Mood normal.         Behavior: Behavior normal.         Thought Content: Thought content normal.         Judgment: Judgment normal.              CRANIAL NERVES     CN III, IV, VI   Pupils are equal, round, and reactive to light.       Significant Labs: All pertinent labs within the past 24 hours have been reviewed.  CBC:   Recent Labs   Lab 06/24/25  0246 06/25/25  0733   WBC 8.32 10.59   HGB 10.9* 11.2*   HCT 32.6* 33.8*    209     CMP:   Recent Labs   Lab 06/24/25  0246 06/25/25  0732    135*   K 3.8 3.7    103   CO2 24 25   GLU 82 91   BUN 28* 21   CREATININE 1.0 0.9   CALCIUM 8.2* 8.2*   PROT 5.6* 5.7*   ALBUMIN 2.7* 2.8*   BILITOT 0.2 0.2   ALKPHOS 51 55   AST 22 33   ALT 10 17   ANIONGAP 9 7*       Significant Imaging: I have reviewed all pertinent imaging results/findings within the past 24 hours.  Imaging Results               CT Ankle (Including Hindfoot) Without Contrast Left (Final result)  Result time 06/22/25 03:31:00      Final result by Alvarez Sharpe MD (06/22/25 03:31:00)                   Impression:      Trimalleolar ankle fracture with near anatomic alignment.    Remote or subacute nondisplaced fracture of the 2nd metatarsal.    This report was flagged in Epic as abnormal.    Electronically signed by resident: Tito Bernard  Date:    06/22/2025  Time:    02:24    Electronically signed by: Alvarez Sharpe MD  Date:    06/22/2025  Time:    03:31               Narrative:    EXAMINATION:  CT ANKLE (INCLUDING HINDFOOT) WITHOUT CONTRAST LEFT; CT 3D RENDERING ON AN INDEPENDENT WORKSTATION    CLINICAL HISTORY:  Fracture, ankle;; Fracture, ankle;fracture;    TECHNIQUE:  CT of the left ankle was performed without the administration of intravenous contrast. 3D reconstructed images were acquired by post processing for a better  "visualization of the anatomic structures, with concurrent supervision and archived for permanent records.    COMPARISON:  X-ray ankle 06/21/2025, x-ray foot 06/21/2025.    FINDINGS:  Again seen are acute mildly displaced comminuted fractures of the distal tibia and fibula.  Satisfactory alignment of the fracture fragments.  Remote or subacute appearing nondisplaced fracture of the proximal 2nd metatarsal.  Ankle joint effusion.  No other fractures or dislocation.  Generalized osteopenia.    There is extensive soft tissue stranding about the ankle.  Calcific atherosclerosis of the partially visualized lower extremity vessels.  Cast material overlies the lower extremity.                                       X-Ray Chest 1 View (Final result)  Result time 06/22/25 00:50:12      Final result by Alvarez Sharpe MD (06/22/25 00:50:12)                   Impression:      No acute finding identified on this limited single view.      Electronically signed by: Alvarez Sharpe MD  Date:    06/22/2025  Time:    00:50               Narrative:    EXAMINATION:  XR CHEST 1 VIEW    CLINICAL HISTORY:  Provided history is "pre op eval;  ".    TECHNIQUE:  One view of the chest.    COMPARISON:  11/28/2025.    FINDINGS:  Nonspecific prominent linear metallic radiodensities overlie the chest and mediastinum.  Cardiomediastinal silhouette is not enlarged.  There are coarse interstitial lung markings but no large focal area of consolidation.  No large pleural effusion.  No pneumothorax.                                       X-Ray Ankle Complete Left (Final result)  Result time 06/21/25 23:24:03      Final result by Alvarez Sharpe MD (06/21/25 23:24:03)                   Impression:      Near anatomic alignment of previously seen ankle fracture/dislocation.      Electronically signed by: Alvarez Sharpe MD  Date:    06/21/2025  Time:    23:24               Narrative:    EXAMINATION:  XR ANKLE COMPLETE 3 VIEW LEFT    CLINICAL " HISTORY:  Unspecified fall, initial encounter    TECHNIQUE:  AP, lateral and oblique views of the left ankle were performed.    COMPARISON:  06/21/2025.    FINDINGS:  Interval placement of overlying cast material, which limits fine bony and soft tissue detail.  Improved alignment of ankle fracture and improved alignment of the ankle joint.  Suspected trimalleolar ankle fracture now demonstrates anatomic alignment.  No new acute displaced fracture.  No new dislocation.  Soft tissue edema about the ankle.  No unexpected radiopaque foreign body.                                       X-Ray Tibia Fibula 2 View Left (Final result)  Result time 06/21/25 18:15:43      Final result by Benito Waldron MD (06/21/25 18:15:43)                   Impression:      1. Distal fibular fracture noting tibiotalar dislocation.  Reimaging following closed reduction is recommended for more detailed evaluation of the distal tibia to definitively exclude fracture.      Electronically signed by: Benito Waldron MD  Date:    06/21/2025  Time:    18:15               Narrative:    EXAMINATION:  XR TIBIA FIBULA 2 VIEW LEFT; XR ANKLE COMPLETE 3 VIEW LEFT    CLINICAL HISTORY:  Unspecified fall, initial encounter    TECHNIQUE:  AP and lateral views of the left tibia and fibula were performed.  Three views left ankle.    COMPARISON:  None.    FINDINGS:  Four views left tibia fibula, three views left ankle.    There is avulsion fracture involving the distal aspect of the fibula noting displacement of the distal fracture fragment.  There is posterior dislocation of the tibiotalar articulation.  There is questionable fracture of the medial malleolus though not confirmed.  Reimaging is recommended following closed reduction to definitively exclude fracture.  There are degenerative changes of the knee.  There is osteopenia.  There is edema about the lower leg and ankle.                                       X-Ray Foot Complete Left (Final result)   Result time 06/21/25 18:15:08      Final result by Benito Waldron MD (06/21/25 18:15:08)                   Impression:      1. There is fracture involving the proximal aspect of the 2nd metatarsal and possibly 3rd.  No significant overlying edema, correlation with any focal tenderness is recommended as this could reflect irregular healing of prior fracture.  Please see separately dictated report for details of the distal tibia and fibula.      Electronically signed by: Benito Waldron MD  Date:    06/21/2025  Time:    18:15               Narrative:    EXAMINATION:  XR FOOT COMPLETE 3 VIEW LEFT    CLINICAL HISTORY:  .  Unspecified fall, initial encounter    TECHNIQUE:  AP, lateral and oblique views of the left foot were performed.    COMPARISON:  None    FINDINGS:  Two views left foot.    No acute displaced fracture or dislocates cracks that there is osteopenia.  There are degenerative changes of the foot.  There is fracture involving the proximal aspect of the 2nd metatarsal and possibly 3rd metatarsal.  No significant edema along the dorsal aspect of the foot.  Please see separately dictated report for details of the ankle.                                       X-Ray Ankle Complete Left (Final result)  Result time 06/21/25 18:15:43      Final result by Benito Waldron MD (06/21/25 18:15:43)                   Impression:      1. Distal fibular fracture noting tibiotalar dislocation.  Reimaging following closed reduction is recommended for more detailed evaluation of the distal tibia to definitively exclude fracture.      Electronically signed by: Benito Waldron MD  Date:    06/21/2025  Time:    18:15               Narrative:    EXAMINATION:  XR TIBIA FIBULA 2 VIEW LEFT; XR ANKLE COMPLETE 3 VIEW LEFT    CLINICAL HISTORY:  Unspecified fall, initial encounter    TECHNIQUE:  AP and lateral views of the left tibia and fibula were performed.  Three views left ankle.    COMPARISON:  None.    FINDINGS:  Four views  left tibia fibula, three views left ankle.    There is avulsion fracture involving the distal aspect of the fibula noting displacement of the distal fracture fragment.  There is posterior dislocation of the tibiotalar articulation.  There is questionable fracture of the medial malleolus though not confirmed.  Reimaging is recommended following closed reduction to definitively exclude fracture.  There are degenerative changes of the knee.  There is osteopenia.  There is edema about the lower leg and ankle.                                          Assessment & Plan  Closed displaced trimalleolar fracture of left ankle  Pt is a 85 y.o. female who presents after a fall at home after missing a step resulting in a left trimalleolar ankle fracture .    -Initial x-ray ankle reveals an avulsion fracture involving the distal aspect of the fibula noting displacement of the distal fracture fragment. There is posterior dislocation of the tibiotalar articulation. There is questionable fracture of the medial malleolus though not confirmed.   -Ortho consulted in the ED and able to doppler left DP pulse and performed L ankle reduction w/ splinting  -Post reduction x-ray ankle reveals interval placement of overlying cast material, which limits fine bony and soft tissue detail. Improved alignment of ankle fracture and improved alignment of the ankle joint. Suspected trimalleolar ankle fracture now demonstrates anatomic alignment.   -CT Left Ankle :Trimalleolar ankle fracture with near anatomic alignment.  -MM pain management  -Ice and keep ankle elevated. Has hard splint so to know for sure swelling changes theyd to take down per ortho discretion to reassess swelling but if takes down and isnt ready would need ex fix as its keeping reduced so difficult assessments  -NWB LLE  -Per ortho DVT ppx: ASA 81mg bid x4 weeks   -PT/OT recs for high intensity therapy, RW/ BSC, loida chair  -ortho at bedside 6/22 reports plan is for surgery in  the next week once swelling better and at this point plan it to wait for OR in house and then pursue post acute stay after unless ortho plans change,  and CM aware  Ortho reports not ready for OR today, family said they told them potentially Friday.   -given gest lower BPS when is NPO per , would ensure gets IVF if NPO the night before. I told  to remind dr mcdonald also about this for possible OR friday  -Q4 Neurovascular Checks  -Fall Precautions  Hypokalemia  Patient's most recent potassium results are listed below.   Recent Labs     06/23/25  0320 06/24/25  0246 06/25/25  0732   K 3.8 3.8 3.7     Plan  - Replete potassium per protocol  - Monitor potassium Daily  - Patient's hypokalemia is stable  Hypertension  Patients blood pressure range in the last 24 hours was: BP  Min: 90/49  Max: 190/81.The patient's inpatient anti-hypertensive regimen is listed below:  Current Antihypertensives       Plan  - hypotensive 6/22 so BP meds held and bolused IVF and improving to 110s now.  going to work on intake and BP stable 6/24 but cont to hold meds  Hypothyroidism  Patient has chronic hypothyroidism. TFTs reviewed-   Lab Results   Component Value Date    TSH 2.778 01/21/2023     -Will continue chronic levothyroxine and adjust for and clinical changes.  Primary osteoarthritis of left knee  -History noted.  -Pt on celebrex and PRN voltaren and skelaxin at home.  -Pain management as above.  Hyperlipidemia   Patient is chronically on statin.will continue for now. Monitor clinically. Last LDL was   Lab Results   Component Value Date    LDLCALC 94 12/16/2020      Stage 3 chronic kidney disease  No previous diagnosis in chart but GFR appears to have been 40-50 since 2020.  Creatine stable for now. BMP reviewed- noted Estimated Creatinine Clearance: 44.3 mL/min (based on SCr of 0.9 mg/dL). according to latest data. Based on current GFR, CKD stage is stage 3 - GFR 30-59.  Monitor UOP and serial BMP and  adjust therapy as needed. Renally dose meds. Avoid nephrotoxic medications and procedures.  Migraines  -History noted. No acute issues.  -PRN sumitriptan.  Anxiety and depression  Patient has recurrent depression which is unknown and is currently controlled. Will Continue anti-depressant medications. We will not consult psychiatry at this time. Patient does not display psychosis at this time. Continue to monitor closely and adjust plan of care as needed.  -Continue lexapro.  Multiple closed fractures of metatarsal bone of left foot  -2nd metatarsal fx seen and questionable 3rd MT fx but CT showing appears to likely be only 2nd MT    Hypercalcemia  Hypercalcemia is likely due to likely volume depletion on admit and improved after IVF. The patient has the following symptoms due to their hypercalcemia: None (asymptomatic). Their most recent calcium and albumin results are listed below.  Recent Labs     06/23/25  0320 06/24/25  0246 06/25/25  0732   CALCIUM 8.0* 8.2* 8.2*   ALBUMIN 2.8* 2.7* 2.8*     Plan  - Obtain the following labs to work up the cause of hypercalcemia: none.   - Treat the hypercalcemia with: IVF bolus.   - The patient's hypercalcemia is resolved.     Osteopenia  -seen on imaging and associated with current fracture    Macrocytic anemia  Anemia is likely due to Iron deficiency. Most recent hemoglobin and hematocrit are listed below.  Recent Labs     06/23/25  0320 06/24/25  0246 06/25/25  0733   HGB 10.8* 10.9* 11.2*   HCT 32.2* 32.6* 33.8*     Plan  - Monitor serial CBC: Daily  - Transfuse PRBC if patient becomes hemodynamically unstable, symptomatic or H/H drops below 7/21.  - Patient has not received any PRBC transfusions to date  - Patient's anemia is currently stable    Iron deficiency anemia  Anemia is likely due to iron deficiency, suspect poor intake per  as UTD on C sscope/ pap as not longer requires as prev normal and age. No bleeding reprted  nor ddark stools. Most recent hemoglobin  and hematocrit are listed below.  Recent Labs     06/23/25  0320 06/24/25  0246 06/25/25  0733   HGB 10.8* 10.9* 11.2*   HCT 32.2* 32.6* 33.8*     Plan  - Monitor serial CBC: Daily  - Transfuse PRBC if patient becomes hemodynamically unstable, symptomatic or H/H drops below 7/21.  - Patient has not received any PRBC transfusions to date  - Patient's anemia is currently stable  - iron started, recheck in 3 months  Constipation  On bowel regimen, present prior to admit  -dulcolaz [place] added as they report has worked before and successful with multile BMS on 6/24  Go to daily senna given above and if need to scale up again will do so    Chronic headaches  Home imitrex PRN    VTE Risk Mitigation (From admission, onward)           Ordered     IP VTE HIGH RISK PATIENT  Once         06/21/25 2305     Place sequential compression device  Until discontinued         06/21/25 2305                    Discharge Planning   MANINDER: 6/27/2025     Code Status: Full Code   Medical Readiness for Discharge Date:   Discharge Plan A: Skilled Nursing Facility                      Lisa Bloom MD  Department of Hospital Medicine   [facility] - Surgery

## 2025-06-25 NOTE — SUBJECTIVE & OBJECTIVE
"Principal Problem:Closed displaced trimalleolar fracture of left ankle    Principal Orthopedic Problem:  As above    Interval History:  No acute events overnight.  Afebrile, hemodynamically stable.  Pain is well-controlled.  Left lower extremity has been iced and elevated and short-leg splint.  NPO since midnight in preparation for possible surgery today.    Review of patient's allergies indicates:   Allergen Reactions    Boniva [ibandronate] Other (See Comments)     SHOULDER PAIN    Demerol [meperidine]     Ramipril Swelling       Current Facility-Administered Medications   Medication    0.9% NaCl infusion    acetaminophen tablet 1,000 mg    aspirin EC tablet 81 mg    atorvastatin tablet 20 mg    dextrose 50% injection 12.5 g    dextrose 50% injection 25 g    EScitalopram oxalate tablet 10 mg    ferrous sulfate tablet 1 each    glucagon (human recombinant) injection 1 mg    glucose chewable tablet 16 g    glucose chewable tablet 24 g    levothyroxine tablet 125 mcg    melatonin tablet 6 mg    methocarbamoL tablet 500 mg    morphine injection 2 mg    naloxone 0.4 mg/mL injection 0.02 mg    ondansetron injection 4 mg    oxyCODONE immediate release tablet 5 mg    oxyCODONE immediate release tablet Tab 10 mg    pregabalin capsule 50 mg    senna-docusate 8.6-50 mg per tablet 1 tablet    sodium chloride 0.9% flush 10 mL    sumatriptan tablet 100 mg     Objective:     Vital Signs (Most Recent):  Temp: 97.5 °F (36.4 °C) (06/25/25 0730)  Pulse: 61 (06/25/25 0730)  Resp: 15 (06/25/25 0730)  BP: (!) 147/67 (06/25/25 0730)  SpO2: (!) 94 % (06/25/25 0730) Vital Signs (24h Range):  Temp:  [96.6 °F (35.9 °C)-97.8 °F (36.6 °C)] 97.5 °F (36.4 °C)  Pulse:  [61-72] 61  Resp:  [15-18] 15  SpO2:  [92 %-96 %] 94 %  BP: (109-147)/(55-67) 147/67     Weight: 60.8 kg (133 lb 15.9 oz)  Height: 5' 6" (167.6 cm)  Body mass index is 21.63 kg/m².      Intake/Output Summary (Last 24 hours) at 6/25/2025 0755  Last data filed at 6/25/2025 " 0554  Gross per 24 hour   Intake 400 ml   Output 754 ml   Net -354 ml        Ortho/SPM Exam  NAD, resting comfortably in bed  A&O x3   Breathing comfortably w/o distress   Extremities WWP     LLE:   Short-leg splint in place.  Splint iced and elevated.    Splint windowed, there is a serous blister over the anterior aspect of the distal tibia.  The skin of the anterior and lateral aspect of the distal leg do wrinkle but only slightly.    She is still moderately swollen.    Sensation intact to light touch throughout.    Wiggles all toes.     Significant Labs:   Recent Lab Results       None          All pertinent labs within the past 24 hours have been reviewed.    Significant Imaging: I have reviewed all pertinent imaging results/findings.

## 2025-06-26 PROBLEM — D53.9 MACROCYTIC ANEMIA: Status: RESOLVED | Noted: 2025-06-23 | Resolved: 2025-06-26

## 2025-06-26 PROBLEM — S92.322A CLOSED DISPLACED FRACTURE OF SECOND METATARSAL BONE OF LEFT FOOT: Status: ACTIVE | Noted: 2025-06-22

## 2025-06-26 PROBLEM — G43.909 MIGRAINES: Status: RESOLVED | Noted: 2025-06-22 | Resolved: 2025-06-26

## 2025-06-26 PROBLEM — D50.8 IRON DEFICIENCY ANEMIA SECONDARY TO INADEQUATE DIETARY IRON INTAKE: Status: ACTIVE | Noted: 2025-06-24

## 2025-06-26 LAB
ABSOLUTE EOSINOPHIL (OHS): 0.45 K/UL
ABSOLUTE MONOCYTE (OHS): 0.69 K/UL (ref 0.3–1)
ABSOLUTE NEUTROPHIL COUNT (OHS): 4.95 K/UL (ref 1.8–7.7)
ALBUMIN SERPL BCP-MCNC: 2.8 G/DL (ref 3.5–5.2)
ALP SERPL-CCNC: 57 UNIT/L (ref 40–150)
ALT SERPL W/O P-5'-P-CCNC: 18 UNIT/L (ref 10–44)
ANION GAP (OHS): 8 MMOL/L (ref 8–16)
AST SERPL-CCNC: 31 UNIT/L (ref 11–45)
BASOPHILS # BLD AUTO: 0.07 K/UL
BASOPHILS NFR BLD AUTO: 0.9 %
BILIRUB SERPL-MCNC: 0.3 MG/DL (ref 0.1–1)
BUN SERPL-MCNC: 19 MG/DL (ref 8–23)
CALCIUM SERPL-MCNC: 8.3 MG/DL (ref 8.7–10.5)
CHLORIDE SERPL-SCNC: 105 MMOL/L (ref 95–110)
CO2 SERPL-SCNC: 24 MMOL/L (ref 23–29)
CREAT SERPL-MCNC: 0.9 MG/DL (ref 0.5–1.4)
ERYTHROCYTE [DISTWIDTH] IN BLOOD BY AUTOMATED COUNT: 12.2 % (ref 11.5–14.5)
GFR SERPLBLD CREATININE-BSD FMLA CKD-EPI: >60 ML/MIN/1.73/M2
GLUCOSE SERPL-MCNC: 91 MG/DL (ref 70–110)
HCT VFR BLD AUTO: 33.1 % (ref 37–48.5)
HGB BLD-MCNC: 10.9 GM/DL (ref 12–16)
IMM GRANULOCYTES # BLD AUTO: 0.02 K/UL (ref 0–0.04)
IMM GRANULOCYTES NFR BLD AUTO: 0.3 % (ref 0–0.5)
LYMPHOCYTES # BLD AUTO: 1.79 K/UL (ref 1–4.8)
MAGNESIUM SERPL-MCNC: 2.2 MG/DL (ref 1.6–2.6)
MCH RBC QN AUTO: 34 PG (ref 27–31)
MCHC RBC AUTO-ENTMCNC: 32.9 G/DL (ref 32–36)
MCV RBC AUTO: 103 FL (ref 82–98)
NUCLEATED RBC (/100WBC) (OHS): 0 /100 WBC
PLATELET # BLD AUTO: 224 K/UL (ref 150–450)
PMV BLD AUTO: 10.9 FL (ref 9.2–12.9)
POTASSIUM SERPL-SCNC: 3.7 MMOL/L (ref 3.5–5.1)
PROT SERPL-MCNC: 5.6 GM/DL (ref 6–8.4)
RBC # BLD AUTO: 3.21 M/UL (ref 4–5.4)
RELATIVE EOSINOPHIL (OHS): 5.6 %
RELATIVE LYMPHOCYTE (OHS): 22.5 % (ref 18–48)
RELATIVE MONOCYTE (OHS): 8.7 % (ref 4–15)
RELATIVE NEUTROPHIL (OHS): 62 % (ref 38–73)
SODIUM SERPL-SCNC: 137 MMOL/L (ref 136–145)
WBC # BLD AUTO: 7.97 K/UL (ref 3.9–12.7)

## 2025-06-26 PROCEDURE — 36415 COLL VENOUS BLD VENIPUNCTURE: CPT | Performed by: HOSPITALIST

## 2025-06-26 PROCEDURE — 97535 SELF CARE MNGMENT TRAINING: CPT

## 2025-06-26 PROCEDURE — 25000003 PHARM REV CODE 250: Performed by: NURSE PRACTITIONER

## 2025-06-26 PROCEDURE — 11000001 HC ACUTE MED/SURG PRIVATE ROOM

## 2025-06-26 PROCEDURE — 83735 ASSAY OF MAGNESIUM: CPT | Performed by: HOSPITALIST

## 2025-06-26 PROCEDURE — 80053 COMPREHEN METABOLIC PANEL: CPT | Performed by: HOSPITALIST

## 2025-06-26 PROCEDURE — 85025 COMPLETE CBC W/AUTO DIFF WBC: CPT | Performed by: HOSPITALIST

## 2025-06-26 PROCEDURE — 63600175 PHARM REV CODE 636 W HCPCS: Performed by: NURSE PRACTITIONER

## 2025-06-26 PROCEDURE — 97530 THERAPEUTIC ACTIVITIES: CPT

## 2025-06-26 PROCEDURE — 25000003 PHARM REV CODE 250: Performed by: HOSPITALIST

## 2025-06-26 RX ORDER — SODIUM CHLORIDE 9 MG/ML
INJECTION, SOLUTION INTRAVENOUS CONTINUOUS
Status: DISCONTINUED | OUTPATIENT
Start: 2025-06-27 | End: 2025-06-27

## 2025-06-26 RX ADMIN — ASPIRIN 81 MG: 81 TABLET, COATED ORAL at 09:06

## 2025-06-26 RX ADMIN — ESCITALOPRAM OXALATE 10 MG: 10 TABLET ORAL at 08:06

## 2025-06-26 RX ADMIN — SENNOSIDES AND DOCUSATE SODIUM 1 TABLET: 50; 8.6 TABLET ORAL at 08:06

## 2025-06-26 RX ADMIN — Medication 6 MG: at 09:06

## 2025-06-26 RX ADMIN — METHOCARBAMOL 500 MG: 500 TABLET ORAL at 02:06

## 2025-06-26 RX ADMIN — ACETAMINOPHEN 1000 MG: 500 TABLET ORAL at 06:06

## 2025-06-26 RX ADMIN — PREGABALIN 50 MG: 50 CAPSULE ORAL at 09:06

## 2025-06-26 RX ADMIN — ACETAMINOPHEN 1000 MG: 500 TABLET ORAL at 09:06

## 2025-06-26 RX ADMIN — ONDANSETRON 4 MG: 2 INJECTION INTRAMUSCULAR; INTRAVENOUS at 02:06

## 2025-06-26 RX ADMIN — ASPIRIN 81 MG: 81 TABLET, COATED ORAL at 08:06

## 2025-06-26 RX ADMIN — PREGABALIN 50 MG: 50 CAPSULE ORAL at 08:06

## 2025-06-26 RX ADMIN — METHOCARBAMOL 500 MG: 500 TABLET ORAL at 08:06

## 2025-06-26 RX ADMIN — ACETAMINOPHEN 1000 MG: 500 TABLET ORAL at 02:06

## 2025-06-26 RX ADMIN — LEVOTHYROXINE SODIUM 125 MCG: 0.12 TABLET ORAL at 06:06

## 2025-06-26 RX ADMIN — OXYCODONE 5 MG: 5 TABLET ORAL at 09:06

## 2025-06-26 RX ADMIN — ATORVASTATIN CALCIUM 20 MG: 20 TABLET, FILM COATED ORAL at 09:06

## 2025-06-26 RX ADMIN — METHOCARBAMOL 500 MG: 500 TABLET ORAL at 09:06

## 2025-06-26 RX ADMIN — FERROUS SULFATE TAB EC 325 MG (65 MG FE EQUIVALENT) 1 EACH: 325 (65 FE) TABLET DELAYED RESPONSE at 09:06

## 2025-06-26 RX ADMIN — SENNOSIDES AND DOCUSATE SODIUM 1 TABLET: 50; 8.6 TABLET ORAL at 09:06

## 2025-06-26 RX ADMIN — FERROUS SULFATE TAB EC 325 MG (65 MG FE EQUIVALENT) 1 EACH: 325 (65 FE) TABLET DELAYED RESPONSE at 08:06

## 2025-06-26 NOTE — ASSESSMENT & PLAN NOTE
Chronic condition and controlled. Patient on Celebrex and PRN Voltaren gel and Skelaxin at home to treat and holding in hospital for now.

## 2025-06-26 NOTE — HOSPITAL COURSE
"Patient was walking down her stairs when she accidentally missed the bottom step causing her left foot to go under her as she fell to the ground. Patient unable to bear weight to left foot and ankle so EMS called and patient brought to ED. Imaging in ED showed "Trimalleolar ankle fracture with near anatomic alignment. Remote or subacute nondisplaced fracture of the 2nd metatarsal." Orthopedics consulted in ED and left ankle reduced and splinted in ED. Patient placed non weight bearing to left lower extremity. Left leg elevated and iced with plans for operative fixation of left ankle fracture once soft tissue swelling improves. PT/OT consulted while patient in hospital and recommending high intensity on discharge when medically ready and plan discharge to [facility] when medically ready after her ankle fracture surgery. Patient placed on multimodals for pain management. Patient placed on Aspirin 81 mg po BID for DVT prophylaxis. Pain controlled to left ankle. Ortho re-evaluated patient and state soft tissue amenable to repair and patient taken to the OR on 7/1/2025 and underwent ORIF of left trimalleolar ankle fracture without fixation of the posterior malleolus and open fixation of syndesmosis by Dr. Yi Walter. Post-op, patient is touch down weight bearing for 6 weeks to left lower extremity per Ortho. Patient continued on Aspirin 81 mg po BID for DVT prophylaxis and will need for 6 weeks. Patient to return to clinic 7 days after discharge from the hospital for removal of the Prevena Restore wound vac, wound check and conversion to a dry dressing with a CAM boot. Patient will also need an appointment June 24 for suture removal and X-rays of left ankle. PT/OT re consulted post-op to work with patient. Patient continued on multimodals for pain management.   "

## 2025-06-26 NOTE — ASSESSMENT & PLAN NOTE
Patient has recurrent depression which is moderate and is currently controlled. Will Continue anti-depressant medications with Lexapro to treat his depression and anxiety. We will not consult psychiatry at this time. Patient does not display psychosis at this time. Continue to monitor closely and adjust plan of care as needed.

## 2025-06-26 NOTE — PLAN OF CARE
[facility]  Discharge Reassessment    Primary Care Provider: Erasmo Jones MD    Expected Discharge Date: 6/30/2025      Patient scheduled for definitive surgery Friday 6/27/25  DC plan pending post surgical therapy evaluations.      Reassessment (most recent)       Discharge Reassessment - 06/26/25 1154          Discharge Reassessment    Assessment Type Discharge Planning Reassessment     Communicated MANINDER with patient/caregiver Yes     Discharge Plan B Skilled Nursing Facility     Transition of Care Barriers None

## 2025-06-26 NOTE — SUBJECTIVE & OBJECTIVE
"Principal Problem:Closed displaced trimalleolar fracture of left ankle    Principal Orthopedic Problem:  As above    Interval History:  No acute events overnight.  Afebrile, hemodynamically stable.  Pain is well-controlled.  Left lower extremity has been iced and elevated and short-leg splint. She still has fracture blisters over the anterior distal leg.     Review of patient's allergies indicates:   Allergen Reactions    Boniva [ibandronate] Other (See Comments)     SHOULDER PAIN    Demerol [meperidine]     Ramipril Swelling       Current Facility-Administered Medications   Medication    acetaminophen tablet 1,000 mg    aspirin EC tablet 81 mg    atorvastatin tablet 20 mg    dextrose 50% injection 12.5 g    dextrose 50% injection 25 g    EScitalopram oxalate tablet 10 mg    ferrous sulfate tablet 1 each    glucagon (human recombinant) injection 1 mg    glucose chewable tablet 16 g    glucose chewable tablet 24 g    levothyroxine tablet 125 mcg    melatonin tablet 6 mg    methocarbamoL tablet 500 mg    morphine injection 2 mg    naloxone 0.4 mg/mL injection 0.02 mg    ondansetron injection 4 mg    oxyCODONE immediate release tablet 5 mg    oxyCODONE immediate release tablet Tab 10 mg    pregabalin capsule 50 mg    senna-docusate 8.6-50 mg per tablet 1 tablet    sodium chloride 0.9% flush 10 mL    sumatriptan tablet 100 mg     Objective:     Vital Signs (Most Recent):  Temp: 97.4 °F (36.3 °C) (06/26/25 0739)  Pulse: 75 (06/26/25 0739)  Resp: 16 (06/26/25 0739)  BP: (!) 157/67 (06/26/25 0739)  SpO2: 95 % (06/26/25 0739) Vital Signs (24h Range):  Temp:  [96.6 °F (35.9 °C)-98.1 °F (36.7 °C)] 97.4 °F (36.3 °C)  Pulse:  [60-75] 75  Resp:  [16-18] 16  SpO2:  [92 %-98 %] 95 %  BP: (118-160)/(57-92) 157/67     Weight: 60.8 kg (133 lb 15.9 oz)  Height: 5' 6" (167.6 cm)  Body mass index is 21.63 kg/m².      Intake/Output Summary (Last 24 hours) at 6/26/2025 0743  Last data filed at 6/26/2025 0500  Gross per 24 hour   Intake " 350 ml   Output 650 ml   Net -300 ml        Ortho/SPM Exam  NAD, resting comfortably in bed  A&O x3   Breathing comfortably w/o distress   Extremities WWP     LLE:   Short-leg splint in place.  Splint iced and elevated.    Splint windowed, there is a serous blister over the anterior aspect of the distal tibia.  The skin of the anterior and lateral aspect of the distal leg do wrinkle.  She is still moderately swollen.    Sensation intact to light touch throughout.    Wiggles all toes.     Significant Labs:   Recent Lab Results         06/26/25  0255        Albumin 2.8       ALP 57       ALT 18       Anion Gap 8       AST 31       Baso # 0.07       Basophil % 0.9       BILIRUBIN TOTAL 0.3  Comment: For infants and newborns, interpretation of results should be based   on gestational age, weight and in agreement with clinical   observations.    Premature Infant recommended reference ranges:   0-24 hours:  <8.0 mg/dL   24-48 hours: <12.0 mg/dL   3-5 days:    <15.0 mg/dL   6-29 days:   <15.0 mg/dL       BUN 19       Calcium 8.3       Chloride 105       CO2 24       Creatinine 0.9       eGFR >60  Comment: Estimated GFR calculated using the CKD-EPI creatinine (2021) equation.       Eos # 0.45       Eos % 5.6       Glucose 91       Gran # (ANC) 4.95       Hematocrit 33.1       Hemoglobin 10.9       Immature Grans (Abs) 0.02  Comment: Mild elevation in immature granulocytes is non specific and can be seen in a variety of conditions including stress response, acute inflammation, trauma and pregnancy. Correlation with other laboratory and clinical findings is essential.       Immature Granulocytes 0.3       Lymph # 1.79       Lymph % 22.5       Magnesium  2.2       MCH 34.0       MCHC 32.9              Mono # 0.69       Mono % 8.7       MPV 10.9       Neut % 62.0       nRBC 0       Platelet Count 224       Potassium 3.7       PROTEIN TOTAL 5.6       RBC 3.21       RDW 12.2       Sodium 137       WBC 7.97             All  pertinent labs within the past 24 hours have been reviewed.    Significant Imaging: I have reviewed all pertinent imaging results/findings.

## 2025-06-26 NOTE — ASSESSMENT & PLAN NOTE
"Patient was walking down her stairs when she accidentally missed the bottom step causing her left foot to go under her as she fell to the ground. Patient unable to bear weight to left foot and ankle so EMS called and patient brought to ED. Imaging in ED showed "Trimalleolar ankle fracture with near anatomic alignment. Remote or subacute nondisplaced fracture of the 2nd metatarsal." Fracture related to osteoporosis.   - Orthopedics consulted in ED and left ankle reduced and splinted in ED. Patient placed non weight bearing to left lower extremity.   - Left leg elevated and iced with plans for operative fixation of left ankle fracture once swelling improves.   - PT/OT consulted while patient in hospital and recommending high intensity on discharge when medically ready and plan discharge to [facility] when medically ready after her ankle fracture surgery.   - Patient placed on multimodals for pain management and will continue as pain controlled.   - Patient placed on Aspirin 81 mg po BID for DVT prophylaxis.   - Ice and keep left ankle elevated at all times to help with soft tissue swelling. Orthopedics to re-evaluate left ankle in the am, 6/27 to see how soft tissues are looking and how well her swelling is prior to determining if can take to OR tomorrow, 6/27 for definitive fixation of left ankle fracture. Patient to be NPO after midnight tonight for possible OR tomorrow with Orthopedics. Patient and  at bedside updated and aware of plan and understand what ankle looks like tomorrow to see if will be able to have surgery or not.   - Fall Precautions  "

## 2025-06-26 NOTE — PLAN OF CARE
No significant events during this shift. Pt VSS and afebrile throughout shift. Pt free of skin breakdown. Pt dressing is CDI. L Leg elevated  No complaints of pain this shift. Pt has been eating and voiding adequately throughout shift. Pt has call light in reach, nonskid socks on. Pt sitting in chair in no distress. Purposeful rounding was preformed during this shift.       Problem: Adult Inpatient Plan of Care  Goal: Plan of Care Review  Outcome: Progressing  Goal: Patient-Specific Goal (Individualized)  Outcome: Progressing  Goal: Absence of Hospital-Acquired Illness or Injury  Outcome: Progressing  Goal: Optimal Comfort and Wellbeing  Outcome: Progressing  Goal: Readiness for Transition of Care  Outcome: Progressing     Problem: Skin Injury Risk Increased  Goal: Skin Health and Integrity  Outcome: Progressing     Problem: Fall Injury Risk  Goal: Absence of Fall and Fall-Related Injury  Outcome: Progressing     Problem: Fatigue  Goal: Improved Activity Tolerance  Outcome: Progressing     Problem: Infection  Goal: Absence of Infection Signs and Symptoms  Outcome: Progressing

## 2025-06-26 NOTE — PROGRESS NOTES
[facility]  Orthopedics  Progress Note  Attg Note:  Patient seen and examined.  I agree with the resident's assessment and plan.  Some mild blistering.  Not yet ready for definitive fixation.    Eliecer Osborne MD    Patient Name: Caity Ashby  MRN: [id number]  Admission Date: 6/21/2025  Hospital Length of Stay: 2 days  Attending Provider: Carmita Zuniga MD  Primary Care Provider: Erasmo Jones MD    Subjective:     Principal Problem:Closed displaced trimalleolar fracture of left ankle    Principal Orthopedic Problem:  As above    Interval History:  No acute events overnight.  Afebrile, hemodynamically stable.  Pain is well-controlled.  Left lower extremity has been iced and elevated and short-leg splint. She still has fracture blisters over the anterior distal leg.     Review of patient's allergies indicates:   Allergen Reactions    Boniva [ibandronate] Other (See Comments)     SHOULDER PAIN    Demerol [meperidine]     Ramipril Swelling       Current Facility-Administered Medications   Medication    acetaminophen tablet 1,000 mg    aspirin EC tablet 81 mg    atorvastatin tablet 20 mg    dextrose 50% injection 12.5 g    dextrose 50% injection 25 g    EScitalopram oxalate tablet 10 mg    ferrous sulfate tablet 1 each    glucagon (human recombinant) injection 1 mg    glucose chewable tablet 16 g    glucose chewable tablet 24 g    levothyroxine tablet 125 mcg    melatonin tablet 6 mg    methocarbamoL tablet 500 mg    morphine injection 2 mg    naloxone 0.4 mg/mL injection 0.02 mg    ondansetron injection 4 mg    oxyCODONE immediate release tablet 5 mg    oxyCODONE immediate release tablet Tab 10 mg    pregabalin capsule 50 mg    senna-docusate 8.6-50 mg per tablet 1 tablet    sodium chloride 0.9% flush 10 mL    sumatriptan tablet 100 mg     Objective:     Vital Signs (Most Recent):  Temp: 97.4 °F (36.3 °C) (06/26/25 0739)  Pulse: 75 (06/26/25 0739)  Resp: 16 (06/26/25 0739)  BP: (!) 157/67  "(06/26/25 0739)  SpO2: 95 % (06/26/25 0739) Vital Signs (24h Range):  Temp:  [96.6 °F (35.9 °C)-98.1 °F (36.7 °C)] 97.4 °F (36.3 °C)  Pulse:  [60-75] 75  Resp:  [16-18] 16  SpO2:  [92 %-98 %] 95 %  BP: (118-160)/(57-92) 157/67     Weight: 60.8 kg (133 lb 15.9 oz)  Height: 5' 6" (167.6 cm)  Body mass index is 21.63 kg/m².      Intake/Output Summary (Last 24 hours) at 6/26/2025 0743  Last data filed at 6/26/2025 0500  Gross per 24 hour   Intake 350 ml   Output 650 ml   Net -300 ml        Ortho/SPM Exam  NAD, resting comfortably in bed  A&O x3   Breathing comfortably w/o distress   Extremities WWP     LLE:   Short-leg splint in place.  Splint iced and elevated.    Splint windowed, there is a serous blister over the anterior aspect of the distal tibia.  The skin of the anterior and lateral aspect of the distal leg do wrinkle.  She is still moderately swollen.    Sensation intact to light touch throughout.    Wiggles all toes.     Significant Labs:   Recent Lab Results         06/26/25  0255        Albumin 2.8       ALP 57       ALT 18       Anion Gap 8       AST 31       Baso # 0.07       Basophil % 0.9       BILIRUBIN TOTAL 0.3  Comment: For infants and newborns, interpretation of results should be based   on gestational age, weight and in agreement with clinical   observations.    Premature Infant recommended reference ranges:   0-24 hours:  <8.0 mg/dL   24-48 hours: <12.0 mg/dL   3-5 days:    <15.0 mg/dL   6-29 days:   <15.0 mg/dL       BUN 19       Calcium 8.3       Chloride 105       CO2 24       Creatinine 0.9       eGFR >60  Comment: Estimated GFR calculated using the CKD-EPI creatinine (2021) equation.       Eos # 0.45       Eos % 5.6       Glucose 91       Gran # (ANC) 4.95       Hematocrit 33.1       Hemoglobin 10.9       Immature Grans (Abs) 0.02  Comment: Mild elevation in immature granulocytes is non specific and can be seen in a variety of conditions including stress response, acute inflammation, trauma " and pregnancy. Correlation with other laboratory and clinical findings is essential.       Immature Granulocytes 0.3       Lymph # 1.79       Lymph % 22.5       Magnesium  2.2       MCH 34.0       MCHC 32.9              Mono # 0.69       Mono % 8.7       MPV 10.9       Neut % 62.0       nRBC 0       Platelet Count 224       Potassium 3.7       PROTEIN TOTAL 5.6       RBC 3.21       RDW 12.2       Sodium 137       WBC 7.97             All pertinent labs within the past 24 hours have been reviewed.    Significant Imaging: I have reviewed all pertinent imaging results/findings.  Assessment/Plan:     * Closed displaced trimalleolar fracture of left ankle  Caity Ashby is a 85 y.o. female with a left trimalleolar ankle fracture.  Presented closed, neurovascularly intact, and without any other musculoskeletal injuries on physical exam.  Closed reduction was performed in the emergency department.  The patient was admitted given concern for her to safely remain nonweightbearing at home.    06/24/2025:  Patient is seen and examined at the bedside this morning.  Her splint is windowed; moderately swelling, no resolution of fracture blisters at the anterior aspect of the distal leg.  She is not yet amenable to definitive fixation.  She may have a diet today.  Surgery pending soft tissues.     Plan:   - NWB to the LLE  - DVT ppx: ASA 81mg bid  - Aggressively ice and elevate the LLE on a double stack of sheets at all times   - Multimodal pain control, limiting narcotics.  - NPO midnight           NADIRA Forrester MD  Orthopedics  [facility]

## 2025-06-26 NOTE — ASSESSMENT & PLAN NOTE
Patient has chronic hypothyroidism. TFTs reviewed-   Lab Results   Component Value Date    TSH 2.778 01/21/2023     Will continue chronic Levothyroxine to treat and adjust for and clinical changes.

## 2025-06-26 NOTE — ASSESSMENT & PLAN NOTE
Present on admit. No previous diagnosis in chart but GFR appears to have been 40-50 since 2020. Creatine stable for now. BMP reviewed- noted Estimated Creatinine Clearance: 44.3 mL/min (based on SCr of 0.9 mg/dL). according to latest data. Based on current GFR, CKD stage is stage 3a. Monitor UOP and serial BMP and adjust therapy as needed. Renally dose meds. Avoid nephrotoxic medications and procedures.

## 2025-06-26 NOTE — ASSESSMENT & PLAN NOTE
Caity Ashyb is a 85 y.o. female with a left trimalleolar ankle fracture.  Presented closed, neurovascularly intact, and without any other musculoskeletal injuries on physical exam.  Closed reduction was performed in the emergency department.  The patient was admitted given concern for her to safely remain nonweightbearing at home.    06/24/2025:  Patient is seen and examined at the bedside this morning.  Her splint is windowed; moderately swelling, no resolution of fracture blisters at the anterior aspect of the distal leg.  She is not yet amenable to definitive fixation.  She may have a diet today.  Surgery pending soft tissues.     Plan:   - NWB to the LLE  - DVT ppx: ASA 81mg bid  - Aggressively ice and elevate the LLE on a double stack of sheets at all times   - Multimodal pain control, limiting narcotics.  - NPO midnight

## 2025-06-26 NOTE — ASSESSMENT & PLAN NOTE
Chronic and controlled. Continue Lipitor to treat. Monitor clinically. Last LDL was   Lab Results   Component Value Date    LDLCALC 94 12/16/2020

## 2025-06-26 NOTE — ASSESSMENT & PLAN NOTE
Patients blood pressure range in the last 24 hours was: BP  Min: 90/49  Max: 190/81.The patient's inpatient anti-hypertensive regimen is listed below:  Current Antihypertensives   None    Plan  - BP is controlled, no changes needed to their regimen. Home HCTZ and Losartan on hold as patient having low BPs in hospital.   - Goal BP < 140/90.

## 2025-06-26 NOTE — PROGRESS NOTES
[facility] Medicine  Progress Note    Patient Name: Caity Ashby  MRN: [id number]  Patient Class: IP- Inpatient   Admission Date: 6/21/2025  Length of Stay: 2 days  Attending Physician: Carmita Zuniga MD  Primary Care Provider: Erasmo Jones MD        Subjective     Principal Problem:Closed displaced trimalleolar fracture of left ankle        HPI:  Caity Ashby is a 85 y.o. female with a PMHx of HTN, HLD, arthritis, anxiety/depression, hypothyroidism, and migraines who presented to the ED for obvious LLE deformity after a fall. Patient states that she was walking down her stairs when she accidentally missed the bottom step causing her foot to go under her as she fell to the ground. Patient denies LOC or head trauma. She had immediate severe pain and a noticeable deformity to her left ankle. Denies numbness/tingling in her LLE. Denies sustaining any additional injuries. She reports prior to the fall she was in her normal state of health and denies CP, SOB, cough, fever/chills, abdominal pain, N/V/D, or urinary complaints.    In the ED, hypertensive initially otherwise VSS, afebrile. CBC unremarkable. Na 135. K 3.1. Bicarb 20. Anion gap 17. Cr 1.3 (bl~1.1). Calcium 10.6. Xray L foot with fracture involving the proximal aspect of the 2nd metatarsal and possibly 3rd. No significant overlying edema, correlation with any focal tenderness is recommended as this could reflect irregular healing of prior fracture. X-ray L ankle revealed an avulsion fracture involving the distal aspect of the fibula noting displacement of the distal fracture fragment. There is posterior dislocation of the tibiotalar articulation.There is questionable fracture of the medial malleolus though not confirmed. Post reduction x-ray ankle revealing interval placement of overlying cast material, which limits fine bony and soft tissue detail. Improved alignment of ankle fracture and improved alignment of the  "ankle joint. Suspected trimalleolar ankle fracture now demonstrates anatomic alignment. CT L ankle pending. Orthopedics consulted in the ED and performed reduction of left ankle and short leg splint applied. Recommend DVT ppx with ASA bid and plan for operative fixation in the coming weeks.The patient received 2g Ancef, 4mg Zofran, 50mcg Fentanyl x2 doses.     Overview/Hospital Course:  Patient was walking down her stairs when she accidentally missed the bottom step causing her left foot to go under her as she fell to the ground. Patient unable to bear weight to left foot and ankle so EMS called and patient brought to ED. Imaging in ED showed "Trimalleolar ankle fracture with near anatomic alignment. Remote or subacute nondisplaced fracture of the 2nd metatarsal." Orthopedics consulted in ED and left ankle reduced and splinted in ED. Patient placed non weight bearing to left lower extremity. Left leg elevated and iced with plans for operative fixation of left ankle fracture once swelling improves. PT/OT consulted while patient in hospital and recommending high intensity on discharge when medically ready and plan discharge to [facility] when medically ready after her ankle fracture surgery. Patient placed on multimodals for pain management. Patient placed on Aspirin 81 mg po BID for DVT prophylaxis. Pain controlled to left ankle.     Interval History: Orthopedics to re-evaluate left ankle in the am to see how soft tissues are looking and how well her swelling is prior to determining if can take to OR tomorrow for definitive fixation of left ankle fracture. Patient has been elevating and icing left ankle to help with swelling. Patient to be NPO after midnight tonight for possible OR tomorrow with Orthopedics. Patient and  at bedside updated and aware of plan and understand what ankle looks like tomorrow to see if will be able to have surgery or not. Patient reports left ankle pain controlled and " 3/10. Patient remains non weight bearing to left lower extremity and short splint in place. Labs reviewed. Hgb stable at 10.9 and was 11.2 yesterday. Sodium stable at 137. Creatinine normal at 0.9.     Review of Systems   Constitutional:  Negative for fever.   Respiratory:  Negative for cough and shortness of breath.    Cardiovascular:  Negative for chest pain.   Gastrointestinal:  Negative for abdominal pain and nausea.   Musculoskeletal:  Positive for arthralgias (Left ankle).   Psychiatric/Behavioral:  Negative for agitation and confusion.      Objective:     Vital Signs (Most Recent):  Temp: 97.5 °F (36.4 °C) (06/26/25 1512)  Pulse: 62 (06/26/25 1512)  Resp: 20 (06/26/25 1512)  BP: 131/59 (06/26/25 1512)  SpO2: 93 % (06/26/25 1512) on room air  Vital Signs (24h Range):  Temp:  [97.4 °F (36.3 °C)-98.1 °F (36.7 °C)] 97.5 °F (36.4 °C)  Pulse:  [62-75] 62  Resp:  [16-20] 20  SpO2:  [93 %-98 %] 93 %  BP: (125-160)/(59-92) 131/59     Weight: 60.8 kg (133 lb 15.9 oz)  Body mass index is 21.63 kg/m².    Intake/Output Summary (Last 24 hours) at 6/26/2025 1708  Last data filed at 6/26/2025 1005  Gross per 24 hour   Intake 350 ml   Output 1550 ml   Net -1200 ml         Physical Exam  Vitals and nursing note reviewed.   Constitutional:       General: She is awake. She is not in acute distress.     Appearance: Normal appearance. She is well-developed and normal weight. She is not ill-appearing.      Comments: Patient sitting up in bedside chair in no distress and  at bedside. Patient awake and alert and oriented x 4.    Eyes:      Conjunctiva/sclera: Conjunctivae normal.   Cardiovascular:      Rate and Rhythm: Normal rate and regular rhythm.      Heart sounds: Normal heart sounds. No murmur heard.  Pulmonary:      Effort: Pulmonary effort is normal. No respiratory distress.      Breath sounds: Normal breath sounds. No wheezing.   Abdominal:      General: Abdomen is flat. Bowel sounds are normal. There is no  "distension.      Palpations: Abdomen is soft.      Tenderness: There is no abdominal tenderness.   Musculoskeletal:      Left lower leg: No edema.      Comments: Left ankle in short splint   Skin:     General: Skin is warm.      Findings: No erythema.   Neurological:      Mental Status: She is alert and oriented to person, place, and time.   Psychiatric:         Mood and Affect: Mood normal.         Behavior: Behavior normal. Behavior is cooperative.         Thought Content: Thought content normal.         Judgment: Judgment normal.               Significant Labs: BMP:   Recent Labs   Lab 06/26/25  0255   GLU 91      K 3.7      CO2 24   BUN 19   CREATININE 0.9   CALCIUM 8.3*   MG 2.2     CBC:   Recent Labs   Lab 06/25/25  0733 06/26/25  0255   WBC 10.59 7.97   HGB 11.2* 10.9*   HCT 33.8* 33.1*    224       Significant Imaging: I have reviewed all pertinent imaging results/findings within the past 24 hours.      Assessment & Plan  Closed displaced trimalleolar fracture of left ankle  Pathological fracture of left ankle due to osteoporosis with routine healing  Patient was walking down her stairs when she accidentally missed the bottom step causing her left foot to go under her as she fell to the ground. Patient unable to bear weight to left foot and ankle so EMS called and patient brought to ED. Imaging in ED showed "Trimalleolar ankle fracture with near anatomic alignment. Remote or subacute nondisplaced fracture of the 2nd metatarsal." Fracture related to osteoporosis.   - Orthopedics consulted in ED and left ankle reduced and splinted in ED. Patient placed non weight bearing to left lower extremity.   - Left leg elevated and iced with plans for operative fixation of left ankle fracture once swelling improves.   - PT/OT consulted while patient in hospital and recommending high intensity on discharge when medically ready and plan discharge to [facility] when medically ready after her " ankle fracture surgery.   - Patient placed on multimodals for pain management and will continue as pain controlled.   - Patient placed on Aspirin 81 mg po BID for DVT prophylaxis.   - Ice and keep left ankle elevated at all times to help with soft tissue swelling. Orthopedics to re-evaluate left ankle in the am, 6/27 to see how soft tissues are looking and how well her swelling is prior to determining if can take to OR tomorrow, 6/27 for definitive fixation of left ankle fracture. Patient to be NPO after midnight tonight for possible OR tomorrow with Orthopedics. Patient and  at bedside updated and aware of plan and understand what ankle looks like tomorrow to see if will be able to have surgery or not.   - Fall Precautions  Closed displaced fracture of second metatarsal bone of left foot  CT imaging on admit showed remote or subacute nondisplaced fracture of the 2nd metatarsal of left foot. Orthopedics evaluated and no treatment or intervention needed.   Primary hypertension  Patients blood pressure range in the last 24 hours was: BP  Min: 90/49  Max: 190/81.The patient's inpatient anti-hypertensive regimen is listed below:  Current Antihypertensives   None    Plan  - BP is controlled, no changes needed to their regimen. Home HCTZ and Losartan on hold as patient having low BPs in hospital.   - Goal BP < 140/90.     Stage 3a chronic kidney disease  Present on admit. No previous diagnosis in chart but GFR appears to have been 40-50 since 2020. Creatine stable for now. BMP reviewed- noted Estimated Creatinine Clearance: 44.3 mL/min (based on SCr of 0.9 mg/dL). according to latest data. Based on current GFR, CKD stage is stage 3a. Monitor UOP and serial BMP and adjust therapy as needed. Renally dose meds. Avoid nephrotoxic medications and procedures.  Acquired hypothyroidism  Patient has chronic hypothyroidism. TFTs reviewed-   Lab Results   Component Value Date    TSH 2.778 01/21/2023     Will continue chronic  Levothyroxine to treat and adjust for and clinical changes.  Primary osteoarthritis of left knee  Chronic condition and controlled. Patient on Celebrex and PRN Voltaren gel and Skelaxin at home to treat and holding in hospital for now.     Anxiety and depression  Patient has recurrent depression which is moderate and is currently controlled. Will Continue anti-depressant medications with Lexapro to treat his depression and anxiety. We will not consult psychiatry at this time. Patient does not display psychosis at this time. Continue to monitor closely and adjust plan of care as needed.    Pure hypercholesterolemia  Chronic and controlled. Continue Lipitor to treat. Monitor clinically. Last LDL was   Lab Results   Component Value Date    LDLCALC 94 12/16/2020      Iron deficiency anemia secondary to inadequate dietary iron intake  Anemia is likely due to iron deficiency, suspect poor intake per  as up to date on C scope/ pap as not longer requires as previous normal and age. No bleeding reprted nor dark stools. Most recent hemoglobin and hematocrit are listed below.  Recent Labs     06/24/25  0246 06/25/25  0733 06/26/25  0255   HGB 10.9* 11.2* 10.9*   HCT 32.6* 33.8* 33.1*     Plan  - Monitor serial CBC: Daily  - Transfuse PRBC if patient becomes hemodynamically unstable, symptomatic or H/H drops below 7/21.  - Patient has not received any PRBC transfusions to date  - Patient's anemia is currently stable  - Continue oral iron replacement to treat.   Chronic headaches  Chronic and controlled. Continue home Imitrex PRN to treat.     VTE Risk Mitigation (From admission, onward)           Ordered     IP VTE HIGH RISK PATIENT  Once         06/21/25 2305     Place sequential compression device  Until discontinued         06/21/25 2305                    Discharge Planning   MANINDER: 6/30/2025     Code Status: Full Code   Discharge Plan A: Rehab ([facility]) after surgical fixation of left ankle fracture      Carmita BOONE  MD Nadia  Department of [facility] Medicine   [facility]

## 2025-06-26 NOTE — ASSESSMENT & PLAN NOTE
CT imaging on admit showed remote or subacute nondisplaced fracture of the 2nd metatarsal of left foot. Orthopedics evaluated and no treatment or intervention needed.

## 2025-06-26 NOTE — ASSESSMENT & PLAN NOTE
Anemia is likely due to iron deficiency, suspect poor intake per  as up to date on C scope/ pap as not longer requires as previous normal and age. No bleeding reprted nor dark stools. Most recent hemoglobin and hematocrit are listed below.  Recent Labs     06/24/25  0246 06/25/25  0733 06/26/25  0255   HGB 10.9* 11.2* 10.9*   HCT 32.6* 33.8* 33.1*     Plan  - Monitor serial CBC: Daily  - Transfuse PRBC if patient becomes hemodynamically unstable, symptomatic or H/H drops below 7/21.  - Patient has not received any PRBC transfusions to date  - Patient's anemia is currently stable  - Continue oral iron replacement to treat.

## 2025-06-26 NOTE — PT/OT/SLP PROGRESS
Occupational Therapy   Treatment    Name: Caity Ashby  MRN: [id number]  Admitting Diagnosis:  Closed displaced trimalleolar fracture of left ankle       Recommendations:     Discharge Recommendations: High Intensity Therapy  Discharge Equipment Recommendations:  bedside commode, walker, rolling, wheelchair, shower chair  Barriers to discharge:  Decreased caregiver support    Assessment:     Caity Ashby is a 85 y.o. female with a medical diagnosis of Closed displaced trimalleolar fracture of left ankle.  She presents with good participation and progress for bathroom ADLs today. Also improving on her technique for transfers using the RW and maintaining NWB L LE. Performance deficits affecting function are weakness, impaired balance, edema, pain, impaired endurance, impaired self care skills, decreased coordination, impaired functional mobility, gait instability, orthopedic precautions.     Rehab Prognosis:  Good; patient would benefit from acute skilled OT services to address these deficits and reach maximum level of function.       Plan:     Patient to be seen 4 x/week to address the above listed problems via self-care/home management, therapeutic activities, therapeutic exercises, neuromuscular re-education  Plan of Care Expires: 07/22/25  Plan of Care Reviewed with: patient    Subjective     Chief Complaint: none  Patient/Family Comments/goals: wished to wash up  Pain/Comfort:  Pain Rating 1: 0/10    Objective:     Communicated with: nurse prior to session.  Patient found HOB elevated with peripheral IV, PureWick, SCD upon OT entry to room.    General Precautions: Standard, fall    Orthopedic Precautions:LLE non weight bearing  Braces: N/A  Respiratory Status: Room air     Occupational Performance:     Bed Mobility:    Patient completed Scooting/Bridging with supervision  Patient completed Supine to Sit with stand by assistance     Functional Mobility/Transfers:  Patient completed Sit <> Stand  Transfer with contact guard assistance  with  rolling walker at sinkside.  Patient completed Bed <> Chair Transfer using Squat Pivot technique with contact guard assistance with hand-held assist  Functional Mobility: transferred x15 ft from chair to bed with hop pattern and L LE NWB requiring CGA. Better foot clearance today.    Activities of Daily Living:  Grooming: modified independence facial hygiene  Bathing: Min A sponge bathing while seated    Upper Body Dressing: supervision hospital gown  Toileting: maximal assistance purewick replaced      AMPAC 6 Click ADL: 20    Treatment & Education:  Pt educated on the following topics:  OT 's plan of care and purpose of visit, ADLs, importance of increased activity in hospital setting, transfer training, bed mobility, body mechanics, assistive device, modifications/compensatory strategies, sequencing, safety precautions, fall prevention, energy conservation techniques, and to call for assistance with call button  Understanding was verbalized, however additional teaching warranted.     Patient left HOB elevated with all lines intact, call button in reach, and L LE elevated with ice.     GOALS:   Multidisciplinary Problems       Occupational Therapy Goals          Problem: Occupational Therapy    Goal Priority Disciplines Outcome Interventions   Occupational Therapy Goal     OT, PT/OT Progressing    Description: Goals to be met by: 7/6/2025     Patient will increase functional independence with ADLs by performing:    UE Dressing with Modified [place].  LE Dressing with Stand-by Assistance using appropriate AE as needed.  Grooming while standing at sink with Stand-by Assistance.  Toileting from 3-in-1 commode over toilet with Stand-by Assistance for hygiene and clothing management.   Supine to sit with Modified [place].  Step transfer with Stand-by Assistance with RW.  Toilet transfer to 3-in-1 commode over toilet with Stand-by Assistance with RW.                          DME Justifications:   Caity requires a commode for home use because she is confined to a single room.  Caity Ashby has a mobility limitation that significantly impairs her ability to participate in one or more mobility related activities of daily living (MRADLs) such as toileting, feeding, dressing, grooming, and bathing in customary locations in the home.  The mobility limitation cannot be sufficiently resolved by the use of a cane or walker.   The use of a manual wheelchair will significantly improve the patients ability to participate in MRADLS and the patient will use it on regular basis in the home.  Caity Ashby has expressed her willingness to use a manual wheelchair in the home. Patients upper body strength is sufficient for propulsion.  She also has a caregiver who is available, willing, and able to provide assistance with the wheelchair when needed.      Time Tracking:     OT Date of Treatment: 06/26/25  OT Start Time: 1415  OT Stop Time: 1459  OT Total Time (min): 44 min    Billable Minutes:Self Care/Home Management 24  Therapeutic Activity 20    OT/LIONEL: OT     Number of LIONEL visits since last OT visit: 0    6/26/2025

## 2025-06-26 NOTE — SUBJECTIVE & OBJECTIVE
Interval History: Orthopedics to re-evaluate left ankle in the am to see how soft tissues are looking and how well her swelling is prior to determining if can take to OR tomorrow for definitive fixation of left ankle fracture. Patient has been elevating and icing left ankle to help with swelling. Patient to be NPO after midnight tonight for possible OR tomorrow with Orthopedics. Patient and  at bedside updated and aware of plan and understand what ankle looks like tomorrow to see if will be able to have surgery or not. Patient reports left ankle pain controlled and 3/10. Patient remains non weight bearing to left lower extremity and short splint in place. Labs reviewed. Hgb stable at 10.9 and was 11.2 yesterday. Sodium stable at 137. Creatinine normal at 0.9.     Review of Systems   Constitutional:  Negative for fever.   Respiratory:  Negative for cough and shortness of breath.    Cardiovascular:  Negative for chest pain.   Gastrointestinal:  Negative for abdominal pain and nausea.   Musculoskeletal:  Positive for arthralgias (Left ankle).   Psychiatric/Behavioral:  Negative for agitation and confusion.      Objective:     Vital Signs (Most Recent):  Temp: 97.5 °F (36.4 °C) (06/26/25 1512)  Pulse: 62 (06/26/25 1512)  Resp: 20 (06/26/25 1512)  BP: 131/59 (06/26/25 1512)  SpO2: 93 % (06/26/25 1512) on room air  Vital Signs (24h Range):  Temp:  [97.4 °F (36.3 °C)-98.1 °F (36.7 °C)] 97.5 °F (36.4 °C)  Pulse:  [62-75] 62  Resp:  [16-20] 20  SpO2:  [93 %-98 %] 93 %  BP: (125-160)/(59-92) 131/59     Weight: 60.8 kg (133 lb 15.9 oz)  Body mass index is 21.63 kg/m².    Intake/Output Summary (Last 24 hours) at 6/26/2025 1708  Last data filed at 6/26/2025 1005  Gross per 24 hour   Intake 350 ml   Output 1550 ml   Net -1200 ml         Physical Exam  Vitals and nursing note reviewed.   Constitutional:       General: She is awake. She is not in acute distress.     Appearance: Normal appearance. She is well-developed and  normal weight. She is not ill-appearing.      Comments: Patient sitting up in bedside chair in no distress and  at bedside. Patient awake and alert and oriented x 4.    Eyes:      Conjunctiva/sclera: Conjunctivae normal.   Cardiovascular:      Rate and Rhythm: Normal rate and regular rhythm.      Heart sounds: Normal heart sounds. No murmur heard.  Pulmonary:      Effort: Pulmonary effort is normal. No respiratory distress.      Breath sounds: Normal breath sounds. No wheezing.   Abdominal:      General: Abdomen is flat. Bowel sounds are normal. There is no distension.      Palpations: Abdomen is soft.      Tenderness: There is no abdominal tenderness.   Musculoskeletal:      Left lower leg: No edema.      Comments: Left ankle in short splint   Skin:     General: Skin is warm.      Findings: No erythema.   Neurological:      Mental Status: She is alert and oriented to person, place, and time.   Psychiatric:         Mood and Affect: Mood normal.         Behavior: Behavior normal. Behavior is cooperative.         Thought Content: Thought content normal.         Judgment: Judgment normal.               Significant Labs: BMP:   Recent Labs   Lab 06/26/25  0255   GLU 91      K 3.7      CO2 24   BUN 19   CREATININE 0.9   CALCIUM 8.3*   MG 2.2     CBC:   Recent Labs   Lab 06/25/25  0733 06/26/25  0255   WBC 10.59 7.97   HGB 11.2* 10.9*   HCT 33.8* 33.1*    224       Significant Imaging: I have reviewed all pertinent imaging results/findings within the past 24 hours.

## 2025-06-27 PROBLEM — Z74.09 OTHER REDUCED MOBILITY: Status: ACTIVE | Noted: 2025-06-27

## 2025-06-27 LAB
ABSOLUTE EOSINOPHIL (OHS): 0.38 K/UL
ABSOLUTE MONOCYTE (OHS): 1.02 K/UL (ref 0.3–1)
ABSOLUTE NEUTROPHIL COUNT (OHS): 7.66 K/UL (ref 1.8–7.7)
ALBUMIN SERPL BCP-MCNC: 2.7 G/DL (ref 3.5–5.2)
ALP SERPL-CCNC: 56 UNIT/L (ref 40–150)
ALT SERPL W/O P-5'-P-CCNC: 14 UNIT/L (ref 10–44)
ANION GAP (OHS): 7 MMOL/L (ref 8–16)
AST SERPL-CCNC: 25 UNIT/L (ref 11–45)
BASOPHILS # BLD AUTO: 0.06 K/UL
BASOPHILS NFR BLD AUTO: 0.6 %
BILIRUB SERPL-MCNC: 0.3 MG/DL (ref 0.1–1)
BUN SERPL-MCNC: 17 MG/DL (ref 8–23)
CALCIUM SERPL-MCNC: 8.3 MG/DL (ref 8.7–10.5)
CHLORIDE SERPL-SCNC: 104 MMOL/L (ref 95–110)
CO2 SERPL-SCNC: 27 MMOL/L (ref 23–29)
CREAT SERPL-MCNC: 0.9 MG/DL (ref 0.5–1.4)
ERYTHROCYTE [DISTWIDTH] IN BLOOD BY AUTOMATED COUNT: 12.5 % (ref 11.5–14.5)
GFR SERPLBLD CREATININE-BSD FMLA CKD-EPI: >60 ML/MIN/1.73/M2
GLUCOSE SERPL-MCNC: 95 MG/DL (ref 70–110)
HCT VFR BLD AUTO: 33 % (ref 37–48.5)
HGB BLD-MCNC: 10.9 GM/DL (ref 12–16)
IMM GRANULOCYTES # BLD AUTO: 0.04 K/UL (ref 0–0.04)
IMM GRANULOCYTES NFR BLD AUTO: 0.4 % (ref 0–0.5)
LYMPHOCYTES # BLD AUTO: 1.62 K/UL (ref 1–4.8)
MAGNESIUM SERPL-MCNC: 2 MG/DL (ref 1.6–2.6)
MCH RBC QN AUTO: 33.9 PG (ref 27–31)
MCHC RBC AUTO-ENTMCNC: 33 G/DL (ref 32–36)
MCV RBC AUTO: 103 FL (ref 82–98)
NUCLEATED RBC (/100WBC) (OHS): 0 /100 WBC
PLATELET # BLD AUTO: 233 K/UL (ref 150–450)
PMV BLD AUTO: 10.8 FL (ref 9.2–12.9)
POTASSIUM SERPL-SCNC: 4.3 MMOL/L (ref 3.5–5.1)
PROT SERPL-MCNC: 5.9 GM/DL (ref 6–8.4)
RBC # BLD AUTO: 3.22 M/UL (ref 4–5.4)
RELATIVE EOSINOPHIL (OHS): 3.5 %
RELATIVE LYMPHOCYTE (OHS): 15 % (ref 18–48)
RELATIVE MONOCYTE (OHS): 9.5 % (ref 4–15)
RELATIVE NEUTROPHIL (OHS): 71 % (ref 38–73)
SODIUM SERPL-SCNC: 138 MMOL/L (ref 136–145)
WBC # BLD AUTO: 10.78 K/UL (ref 3.9–12.7)

## 2025-06-27 PROCEDURE — 97116 GAIT TRAINING THERAPY: CPT

## 2025-06-27 PROCEDURE — 25000003 PHARM REV CODE 250: Performed by: HOSPITALIST

## 2025-06-27 PROCEDURE — 83735 ASSAY OF MAGNESIUM: CPT | Performed by: HOSPITALIST

## 2025-06-27 PROCEDURE — 85025 COMPLETE CBC W/AUTO DIFF WBC: CPT | Performed by: HOSPITALIST

## 2025-06-27 PROCEDURE — 11000001 HC ACUTE MED/SURG PRIVATE ROOM

## 2025-06-27 PROCEDURE — 36415 COLL VENOUS BLD VENIPUNCTURE: CPT | Performed by: HOSPITALIST

## 2025-06-27 PROCEDURE — 25000003 PHARM REV CODE 250: Performed by: NURSE PRACTITIONER

## 2025-06-27 PROCEDURE — 97110 THERAPEUTIC EXERCISES: CPT

## 2025-06-27 PROCEDURE — 80053 COMPREHEN METABOLIC PANEL: CPT | Performed by: HOSPITALIST

## 2025-06-27 PROCEDURE — 25000003 PHARM REV CODE 250: Performed by: INTERNAL MEDICINE

## 2025-06-27 RX ADMIN — LEVOTHYROXINE SODIUM 125 MCG: 0.12 TABLET ORAL at 05:06

## 2025-06-27 RX ADMIN — OXYCODONE 5 MG: 5 TABLET ORAL at 02:06

## 2025-06-27 RX ADMIN — METHOCARBAMOL 500 MG: 500 TABLET ORAL at 09:06

## 2025-06-27 RX ADMIN — OXYCODONE 5 MG: 5 TABLET ORAL at 09:06

## 2025-06-27 RX ADMIN — OXYCODONE 5 MG: 5 TABLET ORAL at 05:06

## 2025-06-27 RX ADMIN — ASPIRIN 81 MG: 81 TABLET, COATED ORAL at 09:06

## 2025-06-27 RX ADMIN — PREGABALIN 50 MG: 50 CAPSULE ORAL at 09:06

## 2025-06-27 RX ADMIN — SODIUM CHLORIDE: 9 INJECTION, SOLUTION INTRAVENOUS at 12:06

## 2025-06-27 RX ADMIN — ACETAMINOPHEN 1000 MG: 500 TABLET ORAL at 05:06

## 2025-06-27 RX ADMIN — SENNOSIDES AND DOCUSATE SODIUM 1 TABLET: 50; 8.6 TABLET ORAL at 08:06

## 2025-06-27 RX ADMIN — METHOCARBAMOL 500 MG: 500 TABLET ORAL at 02:06

## 2025-06-27 RX ADMIN — SENNOSIDES AND DOCUSATE SODIUM 1 TABLET: 50; 8.6 TABLET ORAL at 09:06

## 2025-06-27 RX ADMIN — ACETAMINOPHEN 1000 MG: 500 TABLET ORAL at 09:06

## 2025-06-27 RX ADMIN — ASPIRIN 81 MG: 81 TABLET, COATED ORAL at 08:06

## 2025-06-27 RX ADMIN — ATORVASTATIN CALCIUM 20 MG: 20 TABLET, FILM COATED ORAL at 09:06

## 2025-06-27 RX ADMIN — PREGABALIN 50 MG: 50 CAPSULE ORAL at 08:06

## 2025-06-27 RX ADMIN — ESCITALOPRAM OXALATE 10 MG: 10 TABLET ORAL at 08:06

## 2025-06-27 RX ADMIN — METHOCARBAMOL 500 MG: 500 TABLET ORAL at 08:06

## 2025-06-27 RX ADMIN — ACETAMINOPHEN 1000 MG: 500 TABLET ORAL at 02:06

## 2025-06-27 RX ADMIN — Medication 6 MG: at 09:06

## 2025-06-27 RX ADMIN — FERROUS SULFATE TAB EC 325 MG (65 MG FE EQUIVALENT) 1 EACH: 325 (65 FE) TABLET DELAYED RESPONSE at 08:06

## 2025-06-27 RX ADMIN — FERROUS SULFATE TAB EC 325 MG (65 MG FE EQUIVALENT) 1 EACH: 325 (65 FE) TABLET DELAYED RESPONSE at 09:06

## 2025-06-27 NOTE — SUBJECTIVE & OBJECTIVE
Interval History: Soft tissue still too swollen this am for Ortho to proceed with ankle fracture repair in OR and will re-evaluate again on 6/30. Patient has been icing and elevating her left ankle. Patient's daughter at bedside and aware of plan. She wanted a copy of her Mom's X-ray of ankle showing fracture and I told her she should be able to pull up images on her mother's [facility] account and can also log in and review labs and notes so she can follow her mother in hospital. Daughter was very happy with that information and she was able to log into account while I was in the room and view information and pleased. Patient reports left ankle 3/10 in pain. Hgb stable at 10.9 and was 10.9 yesterday. Creatinine stable at 0.9.     Review of Systems   Constitutional:  Negative for fever.   Respiratory:  Negative for cough and shortness of breath.    Cardiovascular:  Negative for chest pain.   Gastrointestinal:  Negative for abdominal pain and nausea.   Musculoskeletal:  Positive for arthralgias (Left ankle).   Psychiatric/Behavioral:  Negative for agitation and confusion.      Objective:     Vital Signs (Most Recent):  Temp: 98.7 °F (37.1 °C) (06/27/25 0750)  Pulse: 70 (06/27/25 1116)  Resp: 17 (06/27/25 1116)  BP: 109/53 (06/27/25 1116)  SpO2: 96 % (06/27/25 1116) on room air  Vital Signs (24h Range):  Temp:  [97.4 °F (36.3 °C)-98.7 °F (37.1 °C)] 98.7 °F (37.1 °C)  Pulse:  [62-77] 70  Resp:  [17-20] 17  SpO2:  [93 %-97 %] 96 %  BP: (108-147)/(53-67) 109/53     Weight: 60.8 kg (133 lb 15.9 oz)  Body mass index is 21.63 kg/m².    Intake/Output Summary (Last 24 hours) at 6/27/2025 1307  Last data filed at 6/27/2025 0517  Gross per 24 hour   Intake 400 ml   Output 700 ml   Net -300 ml         Physical Exam  Vitals and nursing note reviewed.   Constitutional:       General: She is awake. She is not in acute distress.     Appearance: Normal appearance. She is well-developed and normal weight. She is not ill-appearing.       Comments: Patient sitting up in bedside chair in no distress and  at bedside. Patient awake and alert and oriented x 4.    Cardiovascular:      Rate and Rhythm: Normal rate and regular rhythm.      Heart sounds: Normal heart sounds. No murmur heard.  Pulmonary:      Effort: Pulmonary effort is normal. No respiratory distress.      Breath sounds: Normal breath sounds. No wheezing.   Abdominal:      General: Abdomen is flat. Bowel sounds are normal. There is no distension.      Palpations: Abdomen is soft.      Tenderness: There is no abdominal tenderness.   Musculoskeletal:      Left lower leg: No edema.      Comments: Left ankle in short splint   Skin:     General: Skin is warm.      Findings: No erythema.   Neurological:      Mental Status: She is alert and oriented to person, place, and time.   Psychiatric:         Mood and Affect: Mood normal.         Behavior: Behavior normal. Behavior is cooperative.         Thought Content: Thought content normal.         Judgment: Judgment normal.               Significant Labs: BMP:   Recent Labs   Lab 06/27/25  0429   GLU 95      K 4.3      CO2 27   BUN 17   CREATININE 0.9   CALCIUM 8.3*   MG 2.0     CBC:   Recent Labs   Lab 06/26/25  0255 06/27/25  0429   WBC 7.97 10.78   HGB 10.9* 10.9*   HCT 33.1* 33.0*    233       Significant Imaging: I have reviewed all pertinent imaging results/findings within the past 24 hours.

## 2025-06-27 NOTE — CONSULTS
[facility] VASCULAR ACCESS NOTE       Bed:541/541 A    PRANAV consulted for Peripheral Access.     20G x 1.75IN PIV placed in Left Forearm by PRANAV using Ultrasound Guidance.    Indication: PVA  Attempts: 1      Recommended Care and Maintenance:  Rotate site based on clinical indication or when catheter related complication is suspected.  Dressing Change every 7 days or if dressing is not intact, loose, moist or blood is present.  Flush and Lock every 12 hours (once per shift if not in use) and PRN before and after each use.          Michael Messer RN

## 2025-06-27 NOTE — ASSESSMENT & PLAN NOTE
"Patient was walking down her stairs when she accidentally missed the bottom step causing her left foot to go under her as she fell to the ground. Patient unable to bear weight to left foot and ankle so EMS called and patient brought to ED. Imaging in ED showed "Trimalleolar ankle fracture with near anatomic alignment. Remote or subacute nondisplaced fracture of the 2nd metatarsal." Fracture related to osteoporosis.   - Orthopedics consulted in ED and left ankle reduced and splinted in ED.   - Patient placed non weight bearing to left lower extremity as per Ortho.   - Left leg elevated and iced with plans for operative fixation of left ankle fracture once swelling improves.   - PT/OT consulted while patient in hospital and recommending high intensity on discharge when medically ready and plan discharge to [facility] when medically ready after her ankle fracture surgery.   - Continue on multimodals for pain management with Tylenol 1000 mg po every 8 hours Robaxin 500 mg po TID and will continue as pain controlled.   - Continue on Aspirin 81 mg po BID for DVT prophylaxis.   - Ice and keep left ankle elevated at all times to help with soft tissue swelling. Orthopedics re-evaluated left ankle on 6/27 and stated left ankle too swollen and not amenable currently for surgical repair and will re-evaluate again on 6/30.   - Fall Precautions.    "

## 2025-06-27 NOTE — PT/OT/SLP PROGRESS
Physical Therapy Treatment    Patient Name:  Caity Ashby   MRN:  [id number]    Recommendations:     Discharge Recommendations: High Intensity Therapy  Discharge Equipment Recommendations: walker, rolling, wheelchair, bedside commode, shower chair  Barriers to discharge: Inaccessible home and Decreased caregiver support    Assessment:     Caity Ashby is a 85 y.o. female admitted with a medical diagnosis of Closed displaced trimalleolar fracture of left ankle.  She presents with the following impairments/functional limitations: weakness, impaired endurance, impaired self care skills, impaired functional mobility, gait instability, impaired balance, decreased lower extremity function, orthopedic precautions. Pt would benefit from high intensity/frequency therapy for: Dynamic/static standing/sitting balance through skilled balance training, strengthening with the use of skilled therapeutic exercises interventions, mobility and safety training to ensure safe discharge home through skilled patient and caregiver education, and mobility through adaptive equipment training. Pt highly motivated to return to independent PLOF and can tolerate 3+hours of therapy. Pt continues to benefit from a collaborative multidisciplinary program to improve quality of life and focus on recovery of impairments      Rehab Prognosis: Good; patient would benefit from acute skilled PT services to address these deficits and reach maximum level of function.    Recent Surgery: * No surgery found *      Plan:     During this hospitalization, patient to be seen 4 x/week to address the identified rehab impairments via gait training, therapeutic exercises, therapeutic activities, neuromuscular re-education and progress toward the following goals:    Plan of Care Expires:  07/22/25    Subjective     Chief Complaint: impaired functional mobility  Patient/Family Comments/goals: to get to the bathroom and back to the chair by  herself  Pain/Comfort:  Pain Rating 1: 0/10  Pain Rating Post-Intervention 1: 0/10      Objective:     Communicated with RN prior to session.  Patient found HOB elevated with peripheral IV, PureWick, cryotherapy upon PT entry to room.     General Precautions: Standard, fall  Orthopedic Precautions: LLE non weight bearing  Braces: N/A  Respiratory Status: Room air     Functional Mobility:  Bed Mobility:     Scooting: supervision  Supine to Sit: supervision  Transfers:     Sit to Stand:  contact guard assistance with rolling walker, 1 trial from bed and 1 trial from chair  Gait: 4 ft and 25 ft, RW, Raf; decreased gait speed, hop to gait pattern, occasional assistance with managing RW      AM-PAC 6 CLICK MOBILITY  Turning over in bed (including adjusting bedclothes, sheets and blankets)?: 4  Sitting down on and standing up from a chair with arms (e.g., wheelchair, bedside commode, etc.): 3  Moving from lying on back to sitting on the side of the bed?: 3  Moving to and from a bed to a chair (including a wheelchair)?: 3  Need to walk in hospital room?: 3  Climbing 3-5 steps with a railing?: 1  Basic Mobility Total Score: 17       Treatment & Education:  Patient also educated and instructed on therapeutic exercises. Therapeutic exercises included the following: Ankle pumps (RLE only),Long Arc Quads, Seated marches (Single leg). Pt performed 2x10 on BLEs with demonstration..  Patient educated on role of therapy, goals of session, and benefits of mobilizing.   Discussed PT plan of care during hospitalization.   Patient educated on calling for assistance.   Patient educated on how their diagnosis impacts their mobility within PT scope of practice.   Communication board up to date.  All questions answered within PT scope of practice.    Patient left up in chair with all lines intact, call button in reach, and RN notified.    GOALS:   Multidisciplinary Problems       Physical Therapy Goals          Problem: Physical Therapy     Goal Priority Disciplines Outcome Interventions   Physical Therapy Goal     PT, PT/OT     Description: Goals to be met by: 25     Patient will increase functional independence with mobility by performin. Supine to sit with Set-up Hunt  2. Sit to supine with Contact Guard Assistance  3. Sit to stand transfer with Stand-by Assistance using LRAD as appropriate  4. Bed to chair transfer with Contact Guard Assistance using LRAD as appropriate  5. Gait  x 50 feet with Contact Guard Assistance using LRAD as appropriate.   6. Ascend/Descend 4 inch curb step with Minimal Assistance using LRAD as appropriate.  7. Stand for 8 minutes with Stand-by Assistance using LRAD as appropriate  8. Lower extremity exercise program x30 reps per handout, with assistance as needed    DME Justifications (see above for complete DME recommendations)    Bedside Commode- Patient has a mobility limitation that significantly impairs their ability to participate in one or more mobility related activities of daily living, including toileting. This deficit can be resolved by using a bedside commode. Patient demonstrates mobility limitations that will cause them to be confined to one room at home without bathroom access for up to 30 days. Using a bedside commode will greatly improve the patient's ability to participate in MRADLs.     Rolling Walker- Patient demonstrates a mobility limitation that significantly impairs their ability to participate in one or more mobility related activities of daily living. Patient's mobility limitation cannot be sufficiently resolved with the use of a cane, but can be sufficiently resolved with the use of a rolling walker.The use of a rolling walker will considerably improve their ability to participate in MRADLs. Patient will use the walker on a regular basis at home.                           Time Tracking:     PT Received On: 25  PT Start Time: 1550     PT Stop Time: 1619  PT Total Time  (min): 29 min     Billable Minutes: Gait Training 19 and Therapeutic Exercise 10    Treatment Type: Treatment  PT/PTA: PT     Number of PTA visits since last PT visit: 0     06/27/2025

## 2025-06-27 NOTE — CARE UPDATE
I have reviewed the chart of Caity Ashby who is hospitalized for the following:    Active Hospital Problems    Diagnosis    *Closed displaced trimalleolar fracture of left ankle    Other reduced mobility     Patient with Acute on chronic debility due to fracture/prosthesis. Latest AMPAC and GEMS scores have been reviewed. Evaluation for etiology is complete. Plan includes - Progressive mobility protocol initated  - PT/OT consulted  - Fall precautions in place.        Iron deficiency anemia secondary to inadequate dietary iron intake    Chronic headaches    Pure hypercholesterolemia    Stage 3a chronic kidney disease    Anxiety and depression    Closed displaced fracture of second metatarsal bone of left foot     2nd, possible 3rd on imaging      Pathological fracture of left ankle due to osteoporosis with routine healing    Primary osteoarthritis of left knee    Acquired hypothyroidism    Primary hypertension        Anish Baez, [facility]-BC  [facility] Based [facility]

## 2025-06-27 NOTE — ASSESSMENT & PLAN NOTE
Patient has chronic hypothyroidism. TFTs reviewed-   Lab Results   Component Value Date    TSH 2.778 01/21/2023     Will continue chronic Levothyroxine to treat and adjust for and clinical changes.   Patient information on fall and injury prevention

## 2025-06-27 NOTE — SUBJECTIVE & OBJECTIVE
"Principal Problem:Closed displaced trimalleolar fracture of left ankle    Principal Orthopedic Problem:  As above    Interval History:  No acute events overnight.  Afebrile, hemodynamically stable.  Pain is well-controlled.  Left lower extremity has been iced and elevated in a short-leg splint. She still has fracture blisters over the anterior distal leg.  She ambulate 15 ft with occupational therapy yesterday.    Review of patient's allergies indicates:   Allergen Reactions    Boniva [ibandronate] Other (See Comments)     SHOULDER PAIN    Demerol [meperidine]     Ramipril Swelling       Current Facility-Administered Medications   Medication    acetaminophen tablet 1,000 mg    aspirin EC tablet 81 mg    atorvastatin tablet 20 mg    dextrose 50% injection 12.5 g    dextrose 50% injection 25 g    EScitalopram oxalate tablet 10 mg    ferrous sulfate tablet 1 each    glucagon (human recombinant) injection 1 mg    glucose chewable tablet 16 g    glucose chewable tablet 24 g    levothyroxine tablet 125 mcg    melatonin tablet 6 mg    methocarbamoL tablet 500 mg    morphine injection 2 mg    naloxone 0.4 mg/mL injection 0.02 mg    ondansetron injection 4 mg    oxyCODONE immediate release tablet 5 mg    oxyCODONE immediate release tablet Tab 10 mg    pregabalin capsule 50 mg    senna-docusate 8.6-50 mg per tablet 1 tablet    sodium chloride 0.9% flush 10 mL    sumatriptan tablet 100 mg     Objective:     Vital Signs (Most Recent):  Temp: 98.7 °F (37.1 °C) (06/27/25 0750)  Pulse: 70 (06/27/25 1116)  Resp: 16 (06/27/25 1415)  BP: (!) 109/53 (06/27/25 1116)  SpO2: 96 % (06/27/25 1116) Vital Signs (24h Range):  Temp:  [97.4 °F (36.3 °C)-98.7 °F (37.1 °C)] 98.7 °F (37.1 °C)  Pulse:  [69-77] 70  Resp:  [16-18] 16  SpO2:  [93 %-97 %] 96 %  BP: (108-147)/(53-67) 109/53     Weight: 60.8 kg (133 lb 15.9 oz)  Height: 5' 6" (167.6 cm)  Body mass index is 21.63 kg/m².      Intake/Output Summary (Last 24 hours) at 6/27/2025 1522  Last " data filed at 6/27/2025 0517  Gross per 24 hour   Intake 400 ml   Output 700 ml   Net -300 ml        Ortho/SPM Exam  Gen: NAD, sitting comfortably in bed  CV: regular rate  Resp: non-labored respirations    LLE:   Short-leg splint in place.  Splint iced and elevated.    Splint windowed, there is a serous blister over the anterior aspect of the distal tibia.  The skin of the anterior and lateral aspect of the distal leg wrinkle and are fairly mobile.  She is still moderately swollen.    Sensation intact to light touch throughout.    Wiggles all toes.  Brisk capillary refill to all toes     Significant Labs: All pertinent labs within the past 24 hours have been reviewed.    Significant Imaging: I have reviewed all pertinent imaging results/findings.

## 2025-06-27 NOTE — ASSESSMENT & PLAN NOTE
Controlled. Anemia is likely due to iron deficiency, suspect poor intake per  as up to date on C scope/ pap as not longer requires as previous normal and age. No bleeding reprted nor dark stools. Most recent hemoglobin and hematocrit are listed below.  Recent Labs     06/25/25  0733 06/26/25  0255 06/27/25  0429   HGB 11.2* 10.9* 10.9*   HCT 33.8* 33.1* 33.0*     Plan  - Monitor serial CBC: Daily  - Transfuse PRBC if patient becomes hemodynamically unstable, symptomatic or H/H drops below 7/21.  - Patient has not received any PRBC transfusions to date  - Patient's anemia is currently stable  - Continue oral iron replacement to treat.

## 2025-06-27 NOTE — PLAN OF CARE
Problem: Adult Inpatient Plan of Care  Goal: Plan of Care Review  Outcome: Ongoing  Goal: Patient-Specific Goal (Individualized)  Outcome: Ongoing  Goal: Absence of Hospital-Acquired Illness or Injury  Outcome: Ongoing  Goal: Optimal Comfort and Wellbeing  Outcome: Ongoing  Goal: Readiness for Transition of Care  Outcome: Ongoing     Problem: Skin Injury Risk Increased  Goal: Skin Health and Integrity  Outcome: Ongoing     Problem: Fall Injury Risk  Goal: Absence of Fall and Fall-Related Injury  Outcome: Ongoing     Problem: Fatigue  Goal: Improved Activity Tolerance  Outcome: Ongoing     Problem: Infection  Goal: Absence of Infection Signs and Symptoms  Outcome: Ongoing

## 2025-06-27 NOTE — ASSESSMENT & PLAN NOTE
Patients blood pressure range in the last 24 hours was: BP  Min: 90/49  Max: 190/81.The patient's inpatient anti-hypertensive regimen is listed below:  Current Antihypertensives   None    Plan  - BP is controlled, no changes needed to their regimen. Home HCTZ and Losartan on hold as patient having lower BPs in hospital.   - Goal BP < 140/90.

## 2025-06-27 NOTE — PROGRESS NOTES
[facility] Medicine  Progress Note    Patient Name: Caity Ashby  MRN: [id number]  Patient Class: IP- Inpatient   Admission Date: 6/21/2025  Length of Stay: 3 days  Attending Physician: Carmita Zuniga MD  Primary Care Provider: Erasmo Jones MD        Subjective     Principal Problem:Closed displaced trimalleolar fracture of left ankle        HPI:  Caity Ashby is a 85 y.o. female with a PMHx of HTN, HLD, arthritis, anxiety/depression, hypothyroidism, and migraines who presented to the ED for obvious LLE deformity after a fall. Patient states that she was walking down her stairs when she accidentally missed the bottom step causing her foot to go under her as she fell to the ground. Patient denies LOC or head trauma. She had immediate severe pain and a noticeable deformity to her left ankle. Denies numbness/tingling in her LLE. Denies sustaining any additional injuries. She reports prior to the fall she was in her normal state of health and denies CP, SOB, cough, fever/chills, abdominal pain, N/V/D, or urinary complaints.    In the ED, hypertensive initially otherwise VSS, afebrile. CBC unremarkable. Na 135. K 3.1. Bicarb 20. Anion gap 17. Cr 1.3 (bl~1.1). Calcium 10.6. Xray L foot with fracture involving the proximal aspect of the 2nd metatarsal and possibly 3rd. No significant overlying edema, correlation with any focal tenderness is recommended as this could reflect irregular healing of prior fracture. X-ray L ankle revealed an avulsion fracture involving the distal aspect of the fibula noting displacement of the distal fracture fragment. There is posterior dislocation of the tibiotalar articulation.There is questionable fracture of the medial malleolus though not confirmed. Post reduction x-ray ankle revealing interval placement of overlying cast material, which limits fine bony and soft tissue detail. Improved alignment of ankle fracture and improved alignment of the  "ankle joint. Suspected trimalleolar ankle fracture now demonstrates anatomic alignment. CT L ankle pending. Orthopedics consulted in the ED and performed reduction of left ankle and short leg splint applied. Recommend DVT ppx with ASA bid and plan for operative fixation in the coming weeks.The patient received 2g Ancef, 4mg Zofran, 50mcg Fentanyl x2 doses.     Overview/Hospital Course:  Patient was walking down her stairs when she accidentally missed the bottom step causing her left foot to go under her as she fell to the ground. Patient unable to bear weight to left foot and ankle so EMS called and patient brought to ED. Imaging in ED showed "Trimalleolar ankle fracture with near anatomic alignment. Remote or subacute nondisplaced fracture of the 2nd metatarsal." Orthopedics consulted in ED and left ankle reduced and splinted in ED. Patient placed non weight bearing to left lower extremity. Left leg elevated and iced with plans for operative fixation of left ankle fracture once swelling improves. PT/OT consulted while patient in hospital and recommending high intensity on discharge when medically ready and plan discharge to [facility] when medically ready after her ankle fracture surgery. Patient placed on multimodals for pain management. Patient placed on Aspirin 81 mg po BID for DVT prophylaxis. Pain controlled to left ankle.     Interval History: Soft tissue still too swollen this am for Ortho to proceed with ankle fracture repair in OR and will re-evaluate again on 6/30. Patient has been icing and elevating her left ankle. Patient's daughter at bedside and aware of plan. She wanted a copy of her Mom's X-ray of ankle showing fracture and I told her she should be able to pull up images on her mother's [facility] account and can also log in and review labs and notes so she can follow her mother in hospital. Daughter was very happy with that information and she was able to log into account while I was " in the room and view information and pleased. Patient reports left ankle 3/10 in pain. Hgb stable at 10.9 and was 10.9 yesterday. Creatinine stable at 0.9.     Review of Systems   Constitutional:  Negative for fever.   Respiratory:  Negative for cough and shortness of breath.    Cardiovascular:  Negative for chest pain.   Gastrointestinal:  Negative for abdominal pain and nausea.   Musculoskeletal:  Positive for arthralgias (Left ankle).   Psychiatric/Behavioral:  Negative for agitation and confusion.      Objective:     Vital Signs (Most Recent):  Temp: 98.7 °F (37.1 °C) (06/27/25 0750)  Pulse: 70 (06/27/25 1116)  Resp: 17 (06/27/25 1116)  BP: 109/53 (06/27/25 1116)  SpO2: 96 % (06/27/25 1116) on room air  Vital Signs (24h Range):  Temp:  [97.4 °F (36.3 °C)-98.7 °F (37.1 °C)] 98.7 °F (37.1 °C)  Pulse:  [62-77] 70  Resp:  [17-20] 17  SpO2:  [93 %-97 %] 96 %  BP: (108-147)/(53-67) 109/53     Weight: 60.8 kg (133 lb 15.9 oz)  Body mass index is 21.63 kg/m².    Intake/Output Summary (Last 24 hours) at 6/27/2025 1307  Last data filed at 6/27/2025 0517  Gross per 24 hour   Intake 400 ml   Output 700 ml   Net -300 ml         Physical Exam  Vitals and nursing note reviewed.   Constitutional:       General: She is awake. She is not in acute distress.     Appearance: Normal appearance. She is well-developed and normal weight. She is not ill-appearing.      Comments: Patient sitting up in bedside chair in no distress and daughter at bedside. Patient awake and alert and oriented x 4.    Cardiovascular:      Rate and Rhythm: Normal rate and regular rhythm.      Heart sounds: Normal heart sounds. No murmur heard.  Pulmonary:      Effort: Pulmonary effort is normal. No respiratory distress.      Breath sounds: Normal breath sounds. No wheezing.   Abdominal:      General: Abdomen is flat. Bowel sounds are normal. There is no distension.      Palpations: Abdomen is soft.      Tenderness: There is no abdominal tenderness.  "  Musculoskeletal:      Left lower leg: No edema.      Comments: Left ankle in short splint   Skin:     General: Skin is warm.      Findings: No erythema.   Neurological:      Mental Status: She is alert and oriented to person, place, and time.   Psychiatric:         Mood and Affect: Mood normal.         Behavior: Behavior normal. Behavior is cooperative.         Thought Content: Thought content normal.         Judgment: Judgment normal.               Significant Labs: BMP:   Recent Labs   Lab 06/27/25  0429   GLU 95      K 4.3      CO2 27   BUN 17   CREATININE 0.9   CALCIUM 8.3*   MG 2.0     CBC:   Recent Labs   Lab 06/26/25  0255 06/27/25  0429   WBC 7.97 10.78   HGB 10.9* 10.9*   HCT 33.1* 33.0*    233       Significant Imaging: I have reviewed all pertinent imaging results/findings within the past 24 hours.      Assessment & Plan  Closed displaced trimalleolar fracture of left ankle  Pathological fracture of left ankle due to osteoporosis with routine healing  Patient was walking down her stairs when she accidentally missed the bottom step causing her left foot to go under her as she fell to the ground. Patient unable to bear weight to left foot and ankle so EMS called and patient brought to ED. Imaging in ED showed "Trimalleolar ankle fracture with near anatomic alignment. Remote or subacute nondisplaced fracture of the 2nd metatarsal." Fracture related to osteoporosis.   - Orthopedics consulted in ED and left ankle reduced and splinted in ED.   - Patient placed non weight bearing to left lower extremity as per Ortho.   - Left leg elevated and iced with plans for operative fixation of left ankle fracture once swelling improves.   - PT/OT consulted while patient in hospital and recommending high intensity on discharge when medically ready and plan discharge to [facility] when medically ready after her ankle fracture surgery.   - Continue on multimodals for pain management with " Tylenol 1000 mg po every 8 hours Robaxin 500 mg po TID and will continue as pain controlled.   - Continue on Aspirin 81 mg po BID for DVT prophylaxis.   - Ice and keep left ankle elevated at all times to help with soft tissue swelling. Orthopedics re-evaluated left ankle on 6/27 and stated left ankle too swollen and not amenable currently for surgical repair and will re-evaluate again on 6/30.   - Fall Precautions.    Closed displaced fracture of second metatarsal bone of left foot  CT imaging on admit showed remote or subacute nondisplaced fracture of the 2nd metatarsal of left foot. Orthopedics evaluated and no treatment or intervention needed.     Primary hypertension  Patients blood pressure range in the last 24 hours was: BP  Min: 90/49  Max: 190/81.The patient's inpatient anti-hypertensive regimen is listed below:  Current Antihypertensives   None    Plan  - BP is controlled, no changes needed to their regimen. Home HCTZ and Losartan on hold as patient having lower BPs in hospital.   - Goal BP < 140/90.     Stage 3a chronic kidney disease  Present on admit. No previous diagnosis in chart but GFR appears to have been 40-50 since 2020. Creatine stable for now. BMP reviewed- noted Estimated Creatinine Clearance: 44.3 mL/min (based on SCr of 0.9 mg/dL). according to latest data. Based on current GFR, CKD stage is stage 3a. Monitor UOP and serial BMP and adjust therapy as needed. Renally dose meds. Avoid nephrotoxic medications and procedures.  Acquired hypothyroidism  Patient has chronic hypothyroidism. TFTs reviewed-   Lab Results   Component Value Date    TSH 2.778 01/21/2023     Will continue chronic Levothyroxine to treat and adjust for and clinical changes.  Primary osteoarthritis of left knee  Chronic condition and controlled. Patient on Celebrex and PRN Voltaren gel and Skelaxin at home to treat and holding in hospital for now.     Anxiety and depression  Patient has recurrent depression which is moderate  and is currently controlled. Will Continue anti-depressant medications with Lexapro to treat his depression and anxiety. We will not consult psychiatry at this time. Patient does not display psychosis at this time. Continue to monitor closely and adjust plan of care as needed.    Pure hypercholesterolemia  Chronic and controlled. Continue Lipitor to treat. Monitor clinically. Last LDL was   Lab Results   Component Value Date    LDLCALC 94 12/16/2020      Iron deficiency anemia secondary to inadequate dietary iron intake  Controlled. Anemia is likely due to iron deficiency, suspect poor intake per  as up to date on C scope/ pap as not longer requires as previous normal and age. No bleeding reprted nor dark stools. Most recent hemoglobin and hematocrit are listed below.  Recent Labs     06/25/25  0733 06/26/25  0255 06/27/25  0429   HGB 11.2* 10.9* 10.9*   HCT 33.8* 33.1* 33.0*     Plan  - Monitor serial CBC: Daily  - Transfuse PRBC if patient becomes hemodynamically unstable, symptomatic or H/H drops below 7/21.  - Patient has not received any PRBC transfusions to date  - Patient's anemia is currently stable  - Continue oral iron replacement to treat.   Chronic headaches  Chronic and controlled. Continue home Imitrex PRN to treat.     VTE Risk Mitigation (From admission, onward)           Ordered     IP VTE HIGH RISK PATIENT  Once         06/21/25 2305     Place sequential compression device  Until discontinued         06/21/25 2305                    Discharge Planning   MANINDER: 7/2/2025     Code Status: Full Code   Discharge Plan A: Rehab; Awaiting operative fixation of left ankle fracture by Ortho     Carmita Zuniga MD  Department of Hospital Medicine   [facility] - Surgery

## 2025-06-27 NOTE — ASSESSMENT & PLAN NOTE
Caity Ashby is a 85 y.o. female with a left trimalleolar ankle fracture.  Presented closed, neurovascularly intact, and without any other musculoskeletal injuries on physical exam.  Closed reduction was performed in the emergency department.  The patient was admitted given concern for her to safely remain nonweightbearing at home.    She has been mobilizing with therapy and keeping the leg iced and elevated while at rest.  She developed fracture blisters, but the swelling is beginning to improve.  I anticipate that swelling will allow for surgery on 06/30 or 7/1.     Plan:   NWB to the LLE  DVT prophylaxis: ASA 81mg bid  Aggressively ice and elevate the LLE on a double stack of sheets at all times when in bed or in the chair.  Multimodal pain control, limiting narcotics.  Okay for diet    Dispo: pending surgery, likely 6/30 or 7/1.  To be determined pending skin check on Sunday 6/29

## 2025-06-27 NOTE — PROGRESS NOTES
[facility] - Surgery  Orthopedics  Progress Note    Patient Name: Caity Ashby  MRN: [id number]  Admission Date: 6/21/2025  Hospital Length of Stay: 3 days  Attending Provider: Carmita Zuniga MD  Primary Care Provider: Erasmo Jones MD    Subjective:     Principal Problem:Closed displaced trimalleolar fracture of left ankle    Principal Orthopedic Problem:  As above    Interval History:  No acute events overnight.  Afebrile, hemodynamically stable.  Pain is well-controlled.  Left lower extremity has been iced and elevated in a short-leg splint. She still has fracture blisters over the anterior distal leg.  She ambulate 15 ft with occupational therapy yesterday.    Review of patient's allergies indicates:   Allergen Reactions    Boniva [ibandronate] Other (See Comments)     SHOULDER PAIN    Demerol [meperidine]     Ramipril Swelling       Current Facility-Administered Medications   Medication    acetaminophen tablet 1,000 mg    aspirin EC tablet 81 mg    atorvastatin tablet 20 mg    dextrose 50% injection 12.5 g    dextrose 50% injection 25 g    EScitalopram oxalate tablet 10 mg    ferrous sulfate tablet 1 each    glucagon (human recombinant) injection 1 mg    glucose chewable tablet 16 g    glucose chewable tablet 24 g    levothyroxine tablet 125 mcg    melatonin tablet 6 mg    methocarbamoL tablet 500 mg    morphine injection 2 mg    naloxone 0.4 mg/mL injection 0.02 mg    ondansetron injection 4 mg    oxyCODONE immediate release tablet 5 mg    oxyCODONE immediate release tablet Tab 10 mg    pregabalin capsule 50 mg    senna-docusate 8.6-50 mg per tablet 1 tablet    sodium chloride 0.9% flush 10 mL    sumatriptan tablet 100 mg     Objective:     Vital Signs (Most Recent):  Temp: 98.7 °F (37.1 °C) (06/27/25 0750)  Pulse: 70 (06/27/25 1116)  Resp: 16 (06/27/25 1415)  BP: (!) 109/53 (06/27/25 1116)  SpO2: 96 % (06/27/25 1116) Vital Signs (24h Range):  Temp:  [97.4 °F (36.3 °C)-98.7 °F (37.1 °C)] 98.7 °F  "(37.1 °C)  Pulse:  [69-77] 70  Resp:  [16-18] 16  SpO2:  [93 %-97 %] 96 %  BP: (108-147)/(53-67) 109/53     Weight: 60.8 kg (133 lb 15.9 oz)  Height: 5' 6" (167.6 cm)  Body mass index is 21.63 kg/m².      Intake/Output Summary (Last 24 hours) at 6/27/2025 1522  Last data filed at 6/27/2025 0517  Gross per 24 hour   Intake 400 ml   Output 700 ml   Net -300 ml        Ortho/SPM Exam  Gen: NAD, sitting comfortably in bed  CV: regular rate  Resp: non-labored respirations    LLE:   Short-leg splint in place.  Splint iced and elevated.    Splint windowed, there is a serous blister over the anterior aspect of the distal tibia.  The skin of the anterior and lateral aspect of the distal leg wrinkle and are fairly mobile.  She is still moderately swollen.    Sensation intact to light touch throughout.    Wiggles all toes.  Brisk capillary refill to all toes     Significant Labs: All pertinent labs within the past 24 hours have been reviewed.    Significant Imaging: I have reviewed all pertinent imaging results/findings.  Assessment/Plan:     * Closed displaced trimalleolar fracture of left ankle  Caity Ashby is a 85 y.o. female with a left trimalleolar ankle fracture.  Presented closed, neurovascularly intact, and without any other musculoskeletal injuries on physical exam.  Closed reduction was performed in the emergency department.  The patient was admitted given concern for her to safely remain nonweightbearing at home.    She has been mobilizing with therapy and keeping the leg iced and elevated while at rest.  She developed fracture blisters, but the swelling is beginning to improve.  I anticipate that swelling will allow for surgery on 06/30 or 7/1.     Plan:   NWB to the LLE  DVT prophylaxis: ASA 81mg bid  Aggressively ice and elevate the LLE on a double stack of sheets at all times when in bed or in the chair.  Multimodal pain control, limiting narcotics.  Okay for diet    Dispo: pending surgery, likely 6/30 " or 7/1.  To be determined pending skin check on Sunday 6/29          Baldomero Culp MD  Orthopedics  [facility] - Surgery

## 2025-06-28 LAB
ABSOLUTE EOSINOPHIL (OHS): 0.45 K/UL
ABSOLUTE MONOCYTE (OHS): 0.86 K/UL (ref 0.3–1)
ABSOLUTE NEUTROPHIL COUNT (OHS): 4.48 K/UL (ref 1.8–7.7)
ALBUMIN SERPL BCP-MCNC: 2.4 G/DL (ref 3.5–5.2)
ALP SERPL-CCNC: 52 UNIT/L (ref 40–150)
ALT SERPL W/O P-5'-P-CCNC: 13 UNIT/L (ref 10–44)
ANION GAP (OHS): 7 MMOL/L (ref 8–16)
AST SERPL-CCNC: 20 UNIT/L (ref 11–45)
BASOPHILS # BLD AUTO: 0.06 K/UL
BASOPHILS NFR BLD AUTO: 0.8 %
BILIRUB SERPL-MCNC: 0.3 MG/DL (ref 0.1–1)
BUN SERPL-MCNC: 16 MG/DL (ref 8–23)
CALCIUM SERPL-MCNC: 7.9 MG/DL (ref 8.7–10.5)
CHLORIDE SERPL-SCNC: 106 MMOL/L (ref 95–110)
CO2 SERPL-SCNC: 24 MMOL/L (ref 23–29)
CREAT SERPL-MCNC: 0.8 MG/DL (ref 0.5–1.4)
ERYTHROCYTE [DISTWIDTH] IN BLOOD BY AUTOMATED COUNT: 12.6 % (ref 11.5–14.5)
GFR SERPLBLD CREATININE-BSD FMLA CKD-EPI: >60 ML/MIN/1.73/M2
GLUCOSE SERPL-MCNC: 88 MG/DL (ref 70–110)
HCT VFR BLD AUTO: 29.9 % (ref 37–48.5)
HGB BLD-MCNC: 9.8 GM/DL (ref 12–16)
IMM GRANULOCYTES # BLD AUTO: 0.03 K/UL (ref 0–0.04)
IMM GRANULOCYTES NFR BLD AUTO: 0.4 % (ref 0–0.5)
LYMPHOCYTES # BLD AUTO: 1.76 K/UL (ref 1–4.8)
MAGNESIUM SERPL-MCNC: 2 MG/DL (ref 1.6–2.6)
MCH RBC QN AUTO: 33.2 PG (ref 27–31)
MCHC RBC AUTO-ENTMCNC: 32.8 G/DL (ref 32–36)
MCV RBC AUTO: 101 FL (ref 82–98)
NUCLEATED RBC (/100WBC) (OHS): 0 /100 WBC
PLATELET # BLD AUTO: 219 K/UL (ref 150–450)
PMV BLD AUTO: 10.9 FL (ref 9.2–12.9)
POTASSIUM SERPL-SCNC: 3.9 MMOL/L (ref 3.5–5.1)
PROT SERPL-MCNC: 5.5 GM/DL (ref 6–8.4)
RBC # BLD AUTO: 2.95 M/UL (ref 4–5.4)
RELATIVE EOSINOPHIL (OHS): 5.9 %
RELATIVE LYMPHOCYTE (OHS): 23 % (ref 18–48)
RELATIVE MONOCYTE (OHS): 11.3 % (ref 4–15)
RELATIVE NEUTROPHIL (OHS): 58.6 % (ref 38–73)
SODIUM SERPL-SCNC: 137 MMOL/L (ref 136–145)
WBC # BLD AUTO: 7.64 K/UL (ref 3.9–12.7)

## 2025-06-28 PROCEDURE — 83735 ASSAY OF MAGNESIUM: CPT | Performed by: HOSPITALIST

## 2025-06-28 PROCEDURE — 25000003 PHARM REV CODE 250: Performed by: HOSPITALIST

## 2025-06-28 PROCEDURE — 85025 COMPLETE CBC W/AUTO DIFF WBC: CPT | Performed by: HOSPITALIST

## 2025-06-28 PROCEDURE — 11000001 HC ACUTE MED/SURG PRIVATE ROOM

## 2025-06-28 PROCEDURE — 25000003 PHARM REV CODE 250: Performed by: NURSE PRACTITIONER

## 2025-06-28 PROCEDURE — 80053 COMPREHEN METABOLIC PANEL: CPT | Performed by: HOSPITALIST

## 2025-06-28 PROCEDURE — 36415 COLL VENOUS BLD VENIPUNCTURE: CPT | Performed by: HOSPITALIST

## 2025-06-28 RX ADMIN — Medication 6 MG: at 08:06

## 2025-06-28 RX ADMIN — ACETAMINOPHEN 1000 MG: 500 TABLET ORAL at 10:06

## 2025-06-28 RX ADMIN — OXYCODONE 5 MG: 5 TABLET ORAL at 08:06

## 2025-06-28 RX ADMIN — ASPIRIN 81 MG: 81 TABLET, COATED ORAL at 08:06

## 2025-06-28 RX ADMIN — PREGABALIN 50 MG: 50 CAPSULE ORAL at 08:06

## 2025-06-28 RX ADMIN — SENNOSIDES AND DOCUSATE SODIUM 1 TABLET: 50; 8.6 TABLET ORAL at 08:06

## 2025-06-28 RX ADMIN — LEVOTHYROXINE SODIUM 125 MCG: 0.12 TABLET ORAL at 05:06

## 2025-06-28 RX ADMIN — ESCITALOPRAM OXALATE 10 MG: 10 TABLET ORAL at 08:06

## 2025-06-28 RX ADMIN — METHOCARBAMOL 500 MG: 500 TABLET ORAL at 03:06

## 2025-06-28 RX ADMIN — METHOCARBAMOL 500 MG: 500 TABLET ORAL at 08:06

## 2025-06-28 RX ADMIN — FERROUS SULFATE TAB EC 325 MG (65 MG FE EQUIVALENT) 1 EACH: 325 (65 FE) TABLET DELAYED RESPONSE at 08:06

## 2025-06-28 RX ADMIN — ATORVASTATIN CALCIUM 20 MG: 20 TABLET, FILM COATED ORAL at 08:06

## 2025-06-28 RX ADMIN — ACETAMINOPHEN 1000 MG: 500 TABLET ORAL at 05:06

## 2025-06-28 RX ADMIN — OXYCODONE 5 MG: 5 TABLET ORAL at 11:06

## 2025-06-28 RX ADMIN — ACETAMINOPHEN 1000 MG: 500 TABLET ORAL at 01:06

## 2025-06-28 NOTE — ASSESSMENT & PLAN NOTE
Present on admit. No previous diagnosis in chart but GFR appears to have been 40-50 since 2020. Creatine stable for now. BMP reviewed- noted Estimated Creatinine Clearance: 49.9 mL/min (based on SCr of 0.8 mg/dL). according to latest data. Based on current GFR, CKD stage is stage 3a. Monitor UOP and serial BMP and adjust therapy as needed. Renally dose meds. Avoid nephrotoxic medications and procedures.

## 2025-06-28 NOTE — ASSESSMENT & PLAN NOTE
Controlled. Anemia is likely due to iron deficiency, suspect poor intake per  as up to date on C scope/ pap as not longer requires as previous normal and age. No bleeding reprted nor dark stools. Most recent hemoglobin and hematocrit are listed below.  Recent Labs     06/26/25  0255 06/27/25  0429 06/28/25  0718   HGB 10.9* 10.9* 9.8*   HCT 33.1* 33.0* 29.9*     Plan  - Transfuse PRBC if patient becomes hemodynamically unstable, symptomatic or H/H drops below 7/21.  - Patient has not received any PRBC transfusions to date.  - Patient's anemia is currently stable.  - Continue oral iron replacement to treat.

## 2025-06-28 NOTE — ASSESSMENT & PLAN NOTE
Related to ankle fracture. PT/OT consulted and working with patient. Progressive mobility protocol initiated. Fall precautions.

## 2025-06-28 NOTE — PROGRESS NOTES
[facility] Medicine  Progress Note    Patient Name: Caity Ashby  MRN: [id number]  Patient Class: IP- Inpatient   Admission Date: 6/21/2025  Length of Stay: 4 days  Attending Physician: Carmita Zuniga MD  Primary Care Provider: Erasmo Jones MD        Subjective     Principal Problem:Closed displaced trimalleolar fracture of left ankle        HPI:  Caity Ashby is a 85 y.o. female with a PMHx of HTN, HLD, arthritis, anxiety/depression, hypothyroidism, and migraines who presented to the ED for obvious LLE deformity after a fall. Patient states that she was walking down her stairs when she accidentally missed the bottom step causing her foot to go under her as she fell to the ground. Patient denies LOC or head trauma. She had immediate severe pain and a noticeable deformity to her left ankle. Denies numbness/tingling in her LLE. Denies sustaining any additional injuries. She reports prior to the fall she was in her normal state of health and denies CP, SOB, cough, fever/chills, abdominal pain, N/V/D, or urinary complaints.    In the ED, hypertensive initially otherwise VSS, afebrile. CBC unremarkable. Na 135. K 3.1. Bicarb 20. Anion gap 17. Cr 1.3 (bl~1.1). Calcium 10.6. Xray L foot with fracture involving the proximal aspect of the 2nd metatarsal and possibly 3rd. No significant overlying edema, correlation with any focal tenderness is recommended as this could reflect irregular healing of prior fracture. X-ray L ankle revealed an avulsion fracture involving the distal aspect of the fibula noting displacement of the distal fracture fragment. There is posterior dislocation of the tibiotalar articulation.There is questionable fracture of the medial malleolus though not confirmed. Post reduction x-ray ankle revealing interval placement of overlying cast material, which limits fine bony and soft tissue detail. Improved alignment of ankle fracture and improved alignment of the  "ankle joint. Suspected trimalleolar ankle fracture now demonstrates anatomic alignment. CT L ankle pending. Orthopedics consulted in the ED and performed reduction of left ankle and short leg splint applied. Recommend DVT ppx with ASA bid and plan for operative fixation in the coming weeks.The patient received 2g Ancef, 4mg Zofran, 50mcg Fentanyl x2 doses.     Overview/Hospital Course:  Patient was walking down her stairs when she accidentally missed the bottom step causing her left foot to go under her as she fell to the ground. Patient unable to bear weight to left foot and ankle so EMS called and patient brought to ED. Imaging in ED showed "Trimalleolar ankle fracture with near anatomic alignment. Remote or subacute nondisplaced fracture of the 2nd metatarsal." Orthopedics consulted in ED and left ankle reduced and splinted in ED. Patient placed non weight bearing to left lower extremity. Left leg elevated and iced with plans for operative fixation of left ankle fracture once soft tissue swelling improves. PT/OT consulted while patient in hospital and recommending high intensity on discharge when medically ready and plan discharge to [facility] when medically ready after her ankle fracture surgery. Patient placed on multimodals for pain management. Patient placed on Aspirin 81 mg po BID for DVT prophylaxis. Pain controlled to left ankle.     Interval History: Orthopedics to re-evaluate left ankle swelling tomorrow to se if she can go to OR on 6/30 or 7/1 for operative fixation of left ankle fracture. Patient has been icing and elevating her left leg to help with soft tissue swelling. Patient in good spirits and patiently waiting for surgery. Patient reports left ankle pain is well controlled and 2/10. Labs reviewed and stable so will discontinue daily labs as have been stable.     Review of Systems   Constitutional:  Negative for fever.   Respiratory:  Negative for cough and shortness of breath.  "   Cardiovascular:  Negative for chest pain.   Gastrointestinal:  Negative for abdominal pain and nausea.   Musculoskeletal:  Positive for arthralgias (Left ankle).   Psychiatric/Behavioral:  Negative for agitation and confusion.      Objective:     Vital Signs (Most Recent):  Temp: 97.8 °F (36.6 °C) (06/28/25 1119)  Pulse: 68 (06/28/25 1119)  Resp: 18 (06/28/25 1119)  BP: 123/58 (06/28/25 1119)  SpO2: 94 % (06/28/25 1119) on room air  Vital Signs (24h Range):  Temp:  [97.1 °F (36.2 °C)-98.6 °F (37 °C)] 97.8 °F (36.6 °C)  Pulse:  [67-75] 68  Resp:  [16-18] 18  SpO2:  [94 %-96 %] 94 %  BP: (121-150)/(57-67) 123/58     Weight: 60.8 kg (133 lb 15.9 oz)  Body mass index is 21.63 kg/m².    Intake/Output Summary (Last 24 hours) at 6/28/2025 1327  Last data filed at 6/28/2025 1119  Gross per 24 hour   Intake 450 ml   Output 880 ml   Net -430 ml         Physical Exam  Vitals and nursing note reviewed.   Constitutional:       General: She is awake. She is not in acute distress.     Appearance: Normal appearance. She is well-developed and normal weight. She is not ill-appearing.      Comments: Patient sitting up in bedside chair in no distress and  at bedside. Patient awake and alert and oriented x 4.    Cardiovascular:      Rate and Rhythm: Normal rate and regular rhythm.      Heart sounds: Normal heart sounds. No murmur heard.  Pulmonary:      Effort: Pulmonary effort is normal. No respiratory distress.      Breath sounds: Normal breath sounds. No wheezing.   Abdominal:      General: Abdomen is flat. Bowel sounds are normal. There is no distension.      Palpations: Abdomen is soft.      Tenderness: There is no abdominal tenderness.   Musculoskeletal:      Left lower leg: No edema.      Comments: Left ankle in short splint   Skin:     General: Skin is warm.      Findings: No erythema.   Neurological:      Mental Status: She is alert and oriented to person, place, and time.   Psychiatric:         Mood and Affect: Mood  "normal.         Behavior: Behavior normal. Behavior is cooperative.         Thought Content: Thought content normal.         Judgment: Judgment normal.               Significant Labs: CBC:   Recent Labs   Lab 06/27/25  0429 06/28/25  0718   WBC 10.78 7.64   HGB 10.9* 9.8*   HCT 33.0* 29.9*    219     CMP:   Recent Labs   Lab 06/27/25  0429 06/28/25  0718    137   K 4.3 3.9    106   CO2 27 24   GLU 95 88   BUN 17 16   CREATININE 0.9 0.8   CALCIUM 8.3* 7.9*   PROT 5.9* 5.5*   ALBUMIN 2.7* 2.4*   BILITOT 0.3 0.3   ALKPHOS 56 52   AST 25 20   ALT 14 13   ANIONGAP 7* 7*       Significant Imaging: I have reviewed all pertinent imaging results/findings within the past 24 hours.      Assessment & Plan  Closed displaced trimalleolar fracture of left ankle  Pathological fracture of left ankle due to osteoporosis with routine healing  Patient was walking down her stairs when she accidentally missed the bottom step causing her left foot to go under her as she fell to the ground. Patient unable to bear weight to left foot and ankle so EMS called and patient brought to ED. Imaging in ED showed "Trimalleolar ankle fracture with near anatomic alignment. Remote or subacute nondisplaced fracture of the 2nd metatarsal." Fracture related to osteoporosis.   - Orthopedics consulted in ED and left ankle reduced and splinted in ED.   - Patient placed non weight bearing to left lower extremity as per Ortho.   - Left leg elevated and iced with plans for operative fixation of left ankle fracture once swelling improves.   - PT/OT consulted while patient in hospital and recommending high intensity on discharge when medically ready and plan discharge to [facility] when medically ready after her ankle fracture surgery.   - Continue on multimodals for pain management with Tylenol 1000 mg po every 8 hours Robaxin 500 mg po TID as pain controlled to left ankle.   - Continue on Aspirin 81 mg po BID for DVT prophylaxis. "   - Ice and keep left ankle elevated at all times to help with soft tissue swelling. Orthopedics re-evaluated left ankle on 6/27 and stated left ankle too swollen and not amenable currently for surgical repair and Ortho will re-evaluate again tomorrow, 6/29 to see if can go to surgery on 6/30 or 7/1.   - Fall Precautions.    Closed displaced fracture of second metatarsal bone of left foot  CT imaging on admit showed remote or subacute nondisplaced fracture of the 2nd metatarsal of left foot. Orthopedics evaluated and no treatment or intervention needed.     Primary hypertension  Patients blood pressure range in the last 24 hours was: BP  Min: 90/49  Max: 190/81.The patient's inpatient anti-hypertensive regimen is listed below:  Current Antihypertensives   None    Plan  - BP is controlled, no changes needed to their regimen. Home HCTZ and Losartan on hold as patient having lower BPs in hospital.   - Goal BP < 140/90.     Stage 3a chronic kidney disease  Present on admit. No previous diagnosis in chart but GFR appears to have been 40-50 since 2020. Creatine stable for now. BMP reviewed- noted Estimated Creatinine Clearance: 49.9 mL/min (based on SCr of 0.8 mg/dL). according to latest data. Based on current GFR, CKD stage is stage 3a. Monitor UOP and serial BMP and adjust therapy as needed. Renally dose meds. Avoid nephrotoxic medications and procedures.  Acquired hypothyroidism  Patient has chronic hypothyroidism. TFTs reviewed-   Lab Results   Component Value Date    TSH 2.778 01/21/2023     Will continue chronic Levothyroxine to treat and adjust for and clinical changes.  Primary osteoarthritis of left knee  Chronic condition and controlled. Patient on Celebrex and PRN Voltaren gel and Skelaxin at home to treat and holding in hospital for now.     Anxiety and depression  Patient has recurrent depression which is moderate and is currently controlled. Will Continue anti-depressant medications with Lexapro to treat his  depression and anxiety. We will not consult psychiatry at this time. Patient does not display psychosis at this time. Continue to monitor closely and adjust plan of care as needed.    Pure hypercholesterolemia  Chronic and controlled. Continue Lipitor to treat. Monitor clinically. Last LDL was   Lab Results   Component Value Date    LDLCALC 94 12/16/2020      Iron deficiency anemia secondary to inadequate dietary iron intake  Controlled. Anemia is likely due to iron deficiency, suspect poor intake per  as up to date on C scope/ pap as not longer requires as previous normal and age. No bleeding reprted nor dark stools. Most recent hemoglobin and hematocrit are listed below.  Recent Labs     06/26/25  0255 06/27/25  0429 06/28/25  0718   HGB 10.9* 10.9* 9.8*   HCT 33.1* 33.0* 29.9*     Plan  - Transfuse PRBC if patient becomes hemodynamically unstable, symptomatic or H/H drops below 7/21.  - Patient has not received any PRBC transfusions to date.  - Patient's anemia is currently stable.  - Continue oral iron replacement to treat.   Chronic headaches  Chronic and controlled. Continue home Imitrex PRN to treat.     Other reduced mobility  Related to ankle fracture. PT/OT consulted and working with patient. Progressive mobility protocol initiated. Fall precautions.     VTE Risk Mitigation (From admission, onward)           Ordered     IP VTE HIGH RISK PATIENT  Once         06/21/25 2305     Place sequential compression device  Until discontinued         06/21/25 2305                    Discharge Planning   MANINDER: 7/2/2025     Code Status: Full Code   Discharge Plan A: Rehab; Awaiting surgical fixation of left ankle fracture prior to discharge.       Carmita Zuniga MD  Department of Hospital Medicine   [facility] - Surgery

## 2025-06-28 NOTE — ASSESSMENT & PLAN NOTE
"Patient was walking down her stairs when she accidentally missed the bottom step causing her left foot to go under her as she fell to the ground. Patient unable to bear weight to left foot and ankle so EMS called and patient brought to ED. Imaging in ED showed "Trimalleolar ankle fracture with near anatomic alignment. Remote or subacute nondisplaced fracture of the 2nd metatarsal." Fracture related to osteoporosis.   - Orthopedics consulted in ED and left ankle reduced and splinted in ED.   - Patient placed non weight bearing to left lower extremity as per Ortho.   - Left leg elevated and iced with plans for operative fixation of left ankle fracture once swelling improves.   - PT/OT consulted while patient in hospital and recommending high intensity on discharge when medically ready and plan discharge to [facility] when medically ready after her ankle fracture surgery.   - Continue on multimodals for pain management with Tylenol 1000 mg po every 8 hours Robaxin 500 mg po TID as pain controlled to left ankle.   - Continue on Aspirin 81 mg po BID for DVT prophylaxis.   - Ice and keep left ankle elevated at all times to help with soft tissue swelling. Orthopedics re-evaluated left ankle on 6/27 and stated left ankle too swollen and not amenable currently for surgical repair and Ortho will re-evaluate again tomorrow, 6/29 to see if can go to surgery on 6/30 or 7/1.   - Fall Precautions.    "

## 2025-06-29 PROBLEM — M27.2 ODONTOGENIC INFECTION OF JAW: Status: RESOLVED | Noted: 2017-06-19 | Resolved: 2025-06-29

## 2025-06-29 PROCEDURE — 25000003 PHARM REV CODE 250: Performed by: NURSE PRACTITIONER

## 2025-06-29 PROCEDURE — 11000001 HC ACUTE MED/SURG PRIVATE ROOM

## 2025-06-29 PROCEDURE — 25000003 PHARM REV CODE 250: Performed by: HOSPITALIST

## 2025-06-29 RX ORDER — CEFAZOLIN SODIUM 1 G/3ML
2 INJECTION, POWDER, FOR SOLUTION INTRAMUSCULAR; INTRAVENOUS
OUTPATIENT
Start: 2025-06-29

## 2025-06-29 RX ORDER — SODIUM CHLORIDE 9 MG/ML
INJECTION, SOLUTION INTRAVENOUS CONTINUOUS
Status: DISCONTINUED | OUTPATIENT
Start: 2025-06-30 | End: 2025-06-30

## 2025-06-29 RX ADMIN — METHOCARBAMOL 500 MG: 500 TABLET ORAL at 03:06

## 2025-06-29 RX ADMIN — METHOCARBAMOL 500 MG: 500 TABLET ORAL at 10:06

## 2025-06-29 RX ADMIN — METHOCARBAMOL 500 MG: 500 TABLET ORAL at 09:06

## 2025-06-29 RX ADMIN — SENNOSIDES AND DOCUSATE SODIUM 1 TABLET: 50; 8.6 TABLET ORAL at 09:06

## 2025-06-29 RX ADMIN — ACETAMINOPHEN 1000 MG: 500 TABLET ORAL at 05:06

## 2025-06-29 RX ADMIN — OXYCODONE 5 MG: 5 TABLET ORAL at 11:06

## 2025-06-29 RX ADMIN — ASPIRIN 81 MG: 81 TABLET, COATED ORAL at 09:06

## 2025-06-29 RX ADMIN — ASPIRIN 81 MG: 81 TABLET, COATED ORAL at 10:06

## 2025-06-29 RX ADMIN — LEVOTHYROXINE SODIUM 125 MCG: 0.12 TABLET ORAL at 05:06

## 2025-06-29 RX ADMIN — ACETAMINOPHEN 1000 MG: 500 TABLET ORAL at 09:06

## 2025-06-29 RX ADMIN — ACETAMINOPHEN 1000 MG: 500 TABLET ORAL at 03:06

## 2025-06-29 RX ADMIN — FERROUS SULFATE TAB EC 325 MG (65 MG FE EQUIVALENT) 1 EACH: 325 (65 FE) TABLET DELAYED RESPONSE at 09:06

## 2025-06-29 RX ADMIN — PREGABALIN 50 MG: 50 CAPSULE ORAL at 09:06

## 2025-06-29 RX ADMIN — ESCITALOPRAM OXALATE 10 MG: 10 TABLET ORAL at 10:06

## 2025-06-29 RX ADMIN — ATORVASTATIN CALCIUM 20 MG: 20 TABLET, FILM COATED ORAL at 09:06

## 2025-06-29 RX ADMIN — PREGABALIN 50 MG: 50 CAPSULE ORAL at 10:06

## 2025-06-29 RX ADMIN — FERROUS SULFATE TAB EC 325 MG (65 MG FE EQUIVALENT) 1 EACH: 325 (65 FE) TABLET DELAYED RESPONSE at 10:06

## 2025-06-29 RX ADMIN — SENNOSIDES AND DOCUSATE SODIUM 1 TABLET: 50; 8.6 TABLET ORAL at 10:06

## 2025-06-29 RX ADMIN — OXYCODONE 5 MG: 5 TABLET ORAL at 06:06

## 2025-06-29 RX ADMIN — Medication 6 MG: at 09:06

## 2025-06-29 NOTE — ASSESSMENT & PLAN NOTE
Controlled. Anemia is likely due to iron deficiency, suspect poor intake per  as up to date on C scope/ pap as not longer requires as previous normal and age. No bleeding reprted nor dark stools. Most recent hemoglobin and hematocrit are listed below.  Recent Labs     06/27/25  0429 06/28/25  0718   HGB 10.9* 9.8*   HCT 33.0* 29.9*     Plan  - Transfuse PRBC if patient becomes hemodynamically unstable, symptomatic or H/H drops below 7/21.  - Patient has not received any PRBC transfusions to date.  - Patient's anemia is currently stable.  - Continue oral iron replacement to treat.

## 2025-06-29 NOTE — PROGRESS NOTES
[facility]  Orthopedics  Progress Note    Patient Name: Caity Ashby  MRN: [id number]  Admission Date: 6/21/2025  Hospital Length of Stay: 5 days  Attending Provider: Carmita Zuniga MD  Primary Care Provider: Erasmo Jones MD    Subjective:     Principal Problem:Closed displaced trimalleolar fracture of left ankle    Principal Orthopedic Problem:  As above    Interval History:  Afebrile, hemodynamically stable.  Pain is well-controlled. Had some issues with pure wick, otherwise no acute events.  LLE elevated, iced with some blisters.     Review of patient's allergies indicates:   Allergen Reactions    Boniva [ibandronate] Other (See Comments)     SHOULDER PAIN    Demerol [meperidine]     Ramipril Swelling       Current Facility-Administered Medications   Medication    acetaminophen tablet 1,000 mg    aspirin EC tablet 81 mg    atorvastatin tablet 20 mg    dextrose 50% injection 12.5 g    dextrose 50% injection 25 g    EScitalopram oxalate tablet 10 mg    ferrous sulfate tablet 1 each    glucagon (human recombinant) injection 1 mg    glucose chewable tablet 16 g    glucose chewable tablet 24 g    levothyroxine tablet 125 mcg    melatonin tablet 6 mg    methocarbamoL tablet 500 mg    morphine injection 2 mg    naloxone 0.4 mg/mL injection 0.02 mg    ondansetron injection 4 mg    oxyCODONE immediate release tablet 5 mg    oxyCODONE immediate release tablet Tab 10 mg    pregabalin capsule 50 mg    senna-docusate 8.6-50 mg per tablet 1 tablet    sodium chloride 0.9% flush 10 mL    sumatriptan tablet 100 mg     Objective:     Vital Signs (Most Recent):  Temp: 97.5 °F (36.4 °C) (06/29/25 0549)  Pulse: 68 (06/29/25 0549)  Resp: 18 (06/29/25 0549)  BP: (!) 161/70 (06/29/25 0549)  SpO2: 96 % (06/29/25 0549) Vital Signs (24h Range):  Temp:  [97.3 °F (36.3 °C)-98.6 °F (37 °C)] 97.5 °F (36.4 °C)  Pulse:  [67-78] 68  Resp:  [16-20] 18  SpO2:  [92 %-98 %] 96 %  BP: (123-171)/(58-70) 161/70     Weight: 60.8  "kg (133 lb 15.9 oz)  Height: 5' 6" (167.6 cm)  Body mass index is 21.63 kg/m².      Intake/Output Summary (Last 24 hours) at 6/29/2025 0645  Last data filed at 6/29/2025 0625  Gross per 24 hour   Intake --   Output 550 ml   Net -550 ml        Ortho/SPM Exam  Gen: NAD, sitting comfortably in bed  CV: regular rate  Resp: non-labored respirations    LLE:   Short-leg splint in place.  Splint iced and elevated.    Splint windowed, there is a serous blister over the anterior aspect of the distal tibia.  The skin of the anterior and lateral aspect of the distal leg wrinkle and are very mobile.  Swelling significantly improved  Sensation intact to light touch throughout.    Wiggles all toes.  Brisk capillary refill to all toes     Significant Labs: All pertinent labs within the past 24 hours have been reviewed.    Significant Imaging: I have reviewed all pertinent imaging results/findings.  Assessment/Plan:     * Closed displaced trimalleolar fracture of left ankle  Caity Ashby is a 85 y.o. female with a left trimalleolar ankle fracture.  Presented closed, neurovascularly intact, and without any other musculoskeletal injuries on physical exam.  Closed reduction was performed in the emergency department.  The patient was admitted given concern for her to safely remain nonweightbearing at home.    She has been mobilizing with therapy and keeping the leg iced and elevated while at rest.  She developed fracture blisters, but the swelling is beginning to improve.       Plan:   NWB to the LLE  DVT prophylaxis: ASA 81mg bid  Aggressively ice and elevate the LLE on a double stack of sheets at all times when in bed or in the chair.  Multimodal pain control, limiting narcotics.  NPO midnight    Dispo: Skin amenable to surgery. Plan for surgery tomorrow 06/30          Levi Trejo MD  Orthopedics  [facility] - Surgery    "

## 2025-06-29 NOTE — ASSESSMENT & PLAN NOTE
Patients blood pressure range in the last 24 hours was: BP  Min: 90/49  Max: 190/81.The patient's inpatient anti-hypertensive regimen is listed below:  Current Antihypertensives   None    Plan  - BP is controlled, no changes needed to their regimen. Home HCTZ and Losartan on hold as patient having lower BPs in hospital and now going to OR on 6/30 so want to hold for surgery too.   - Goal BP < 140/90.

## 2025-06-29 NOTE — SUBJECTIVE & OBJECTIVE
"Principal Problem:Closed displaced trimalleolar fracture of left ankle    Principal Orthopedic Problem:  As above    Interval History:  Afebrile, hemodynamically stable.  Pain is well-controlled. Had some issues with pure wick, otherwise no acute events.  LLE elevated, iced with some blisters.     Review of patient's allergies indicates:   Allergen Reactions    Boniva [ibandronate] Other (See Comments)     SHOULDER PAIN    Demerol [meperidine]     Ramipril Swelling       Current Facility-Administered Medications   Medication    acetaminophen tablet 1,000 mg    aspirin EC tablet 81 mg    atorvastatin tablet 20 mg    dextrose 50% injection 12.5 g    dextrose 50% injection 25 g    EScitalopram oxalate tablet 10 mg    ferrous sulfate tablet 1 each    glucagon (human recombinant) injection 1 mg    glucose chewable tablet 16 g    glucose chewable tablet 24 g    levothyroxine tablet 125 mcg    melatonin tablet 6 mg    methocarbamoL tablet 500 mg    morphine injection 2 mg    naloxone 0.4 mg/mL injection 0.02 mg    ondansetron injection 4 mg    oxyCODONE immediate release tablet 5 mg    oxyCODONE immediate release tablet Tab 10 mg    pregabalin capsule 50 mg    senna-docusate 8.6-50 mg per tablet 1 tablet    sodium chloride 0.9% flush 10 mL    sumatriptan tablet 100 mg     Objective:     Vital Signs (Most Recent):  Temp: 97.5 °F (36.4 °C) (06/29/25 0549)  Pulse: 68 (06/29/25 0549)  Resp: 18 (06/29/25 0549)  BP: (!) 161/70 (06/29/25 0549)  SpO2: 96 % (06/29/25 0549) Vital Signs (24h Range):  Temp:  [97.3 °F (36.3 °C)-98.6 °F (37 °C)] 97.5 °F (36.4 °C)  Pulse:  [67-78] 68  Resp:  [16-20] 18  SpO2:  [92 %-98 %] 96 %  BP: (123-171)/(58-70) 161/70     Weight: 60.8 kg (133 lb 15.9 oz)  Height: 5' 6" (167.6 cm)  Body mass index is 21.63 kg/m².      Intake/Output Summary (Last 24 hours) at 6/29/2025 0665  Last data filed at 6/29/2025 0625  Gross per 24 hour   Intake --   Output 550 ml   Net -550 ml        Ortho/SPM Exam  Gen: NAD, " sitting comfortably in bed  CV: regular rate  Resp: non-labored respirations    LLE:   Short-leg splint in place.  Splint iced and elevated.    Splint windowed, there is a serous blister over the anterior aspect of the distal tibia.  The skin of the anterior and lateral aspect of the distal leg wrinkle and are very mobile.  Swelling significantly improved  Sensation intact to light touch throughout.    Wiggles all toes.  Brisk capillary refill to all toes     Significant Labs: All pertinent labs within the past 24 hours have been reviewed.    Significant Imaging: I have reviewed all pertinent imaging results/findings.

## 2025-06-29 NOTE — PROGRESS NOTES
[facility] Medicine  Progress Note    Patient Name: Caity Ashby  MRN: [id number]  Patient Class: IP- Inpatient   Admission Date: 6/21/2025  Length of Stay: 5 days  Attending Physician: Carmita Zuniga MD  Primary Care Provider: Erasmo Jones MD        Subjective     Principal Problem:Closed displaced trimalleolar fracture of left ankle        HPI:  Caity Ashby is a 85 y.o. female with a PMHx of HTN, HLD, arthritis, anxiety/depression, hypothyroidism, and migraines who presented to the ED for obvious LLE deformity after a fall. Patient states that she was walking down her stairs when she accidentally missed the bottom step causing her foot to go under her as she fell to the ground. Patient denies LOC or head trauma. She had immediate severe pain and a noticeable deformity to her left ankle. Denies numbness/tingling in her LLE. Denies sustaining any additional injuries. She reports prior to the fall she was in her normal state of health and denies CP, SOB, cough, fever/chills, abdominal pain, N/V/D, or urinary complaints.    In the ED, hypertensive initially otherwise VSS, afebrile. CBC unremarkable. Na 135. K 3.1. Bicarb 20. Anion gap 17. Cr 1.3 (bl~1.1). Calcium 10.6. Xray L foot with fracture involving the proximal aspect of the 2nd metatarsal and possibly 3rd. No significant overlying edema, correlation with any focal tenderness is recommended as this could reflect irregular healing of prior fracture. X-ray L ankle revealed an avulsion fracture involving the distal aspect of the fibula noting displacement of the distal fracture fragment. There is posterior dislocation of the tibiotalar articulation.There is questionable fracture of the medial malleolus though not confirmed. Post reduction x-ray ankle revealing interval placement of overlying cast material, which limits fine bony and soft tissue detail. Improved alignment of ankle fracture and improved alignment of the  "ankle joint. Suspected trimalleolar ankle fracture now demonstrates anatomic alignment. CT L ankle pending. Orthopedics consulted in the ED and performed reduction of left ankle and short leg splint applied. Recommend DVT ppx with ASA bid and plan for operative fixation in the coming weeks.The patient received 2g Ancef, 4mg Zofran, 50mcg Fentanyl x2 doses.     Overview/Hospital Course:  Patient was walking down her stairs when she accidentally missed the bottom step causing her left foot to go under her as she fell to the ground. Patient unable to bear weight to left foot and ankle so EMS called and patient brought to ED. Imaging in ED showed "Trimalleolar ankle fracture with near anatomic alignment. Remote or subacute nondisplaced fracture of the 2nd metatarsal." Orthopedics consulted in ED and left ankle reduced and splinted in ED. Patient placed non weight bearing to left lower extremity. Left leg elevated and iced with plans for operative fixation of left ankle fracture once soft tissue swelling improves. PT/OT consulted while patient in hospital and recommending high intensity on discharge when medically ready and plan discharge to [facility] when medically ready after her ankle fracture surgery. Patient placed on multimodals for pain management. Patient placed on Aspirin 81 mg po BID for DVT prophylaxis. Pain controlled to left ankle. Ortho re-evaluated patient and state soft tissue amenable to repair on 6/29 and plan to take to OR on 6/30 for left ankle fracture repair.     Interval History: Ortho re-evaluated patient today and stated soft tissue amenable to repair and plan to take to OR tomorrow on 6/30 for left ankle fracture repair. Plan to make patient NPO after midnight tonight and will start IVF's after midnight while she is NPO. Patient's  at bedside and patient and  aware of plan.  upset as he states his wife did not get good care overnight from night nursing team and " he states her purewick came loose several times and she wet the bed. He stated she did not have those problems with day nurses just night nurses. I informed charge nurse on days, Saul this morning and he went and spoke with  and patient about overnight nursing issues.     Review of Systems   Constitutional:  Negative for fever.   Respiratory:  Negative for cough and shortness of breath.    Cardiovascular:  Negative for chest pain.   Gastrointestinal:  Negative for abdominal pain and nausea.   Musculoskeletal:  Positive for arthralgias (Left ankle).   Psychiatric/Behavioral:  Negative for agitation and confusion.      Objective:     Vital Signs (Most Recent):  Temp: 97.5 °F (36.4 °C) (06/29/25 0549)  Pulse: 68 (06/29/25 0549)  Resp: 18 (06/29/25 0549)  BP: 151/70 (06/29/25 0549)  SpO2: 96 % (06/29/25 0549) on room air Vital Signs (24h Range):  Temp:  [97.5 °F (36.4 °C)-98.2 °F (36.8 °C)] 97.5 °F (36.4 °C)  Pulse:  [68-78] 68  Resp:  [17-18] 18  SpO2:  [92 %-97 %] 96 %  BP: (134-171)/(66-70) 161/70     Weight: 60.8 kg (133 lb 15.9 oz)  Body mass index is 21.63 kg/m².    Intake/Output Summary (Last 24 hours) at 6/29/2025 1534  Last data filed at 6/29/2025 1026  Gross per 24 hour   Intake --   Output 700 ml   Net -700 ml         Physical Exam  Vitals and nursing note reviewed.   Constitutional:       General: She is awake. She is not in acute distress.     Appearance: Normal appearance. She is well-developed and normal weight. She is not ill-appearing.      Comments: Patient sitting up in bedside chair in no distress and  at bedside. Patient awake and alert and oriented x 4.    Cardiovascular:      Rate and Rhythm: Normal rate and regular rhythm.      Heart sounds: Normal heart sounds. No murmur heard.  Pulmonary:      Effort: Pulmonary effort is normal. No respiratory distress.      Breath sounds: Normal breath sounds. No wheezing.   Abdominal:      General: Abdomen is flat. Bowel sounds are normal.  "There is no distension.      Palpations: Abdomen is soft.      Tenderness: There is no abdominal tenderness.   Musculoskeletal:      Left lower leg: No edema.      Comments: Left ankle in short splint   Skin:     General: Skin is warm.      Findings: No erythema.   Neurological:      Mental Status: She is alert and oriented to person, place, and time.   Psychiatric:         Mood and Affect: Mood normal.         Behavior: Behavior normal. Behavior is cooperative.         Thought Content: Thought content normal.         Judgment: Judgment normal.               Significant Labs: All pertinent labs within the past 24 hours have been reviewed.    Significant Imaging: I have reviewed all pertinent imaging results/findings within the past 24 hours.      Assessment & Plan  Closed displaced trimalleolar fracture of left ankle  Pathological fracture of left ankle due to osteoporosis with routine healing  Patient was walking down her stairs when she accidentally missed the bottom step causing her left foot to go under her as she fell to the ground. Patient unable to bear weight to left foot and ankle so EMS called and patient brought to ED. Imaging in ED showed "Trimalleolar ankle fracture with near anatomic alignment. Remote or subacute nondisplaced fracture of the 2nd metatarsal." Fracture related to osteoporosis.   - Orthopedics consulted in ED and left ankle reduced and splinted in ED.   - Patient placed non weight bearing to left lower extremity as per Ortho.   - Left leg elevated and iced with plans for operative fixation of left ankle fracture once swelling improves.   - PT/OT consulted while patient in hospital and recommending high intensity on discharge when medically ready and plan discharge to [facility] when medically ready after her ankle fracture surgery.   - Continue on multimodals for pain management with Tylenol 1000 mg po every 8 hours Robaxin 500 mg po TID as pain controlled to left ankle.   - " Continue on Aspirin 81 mg po BID for DVT prophylaxis.   - Ice and keep left ankle elevated at all times to help with soft tissue swelling. Orthopedics re-evaluated left ankle on 6/27 and stated left ankle too swollen and not amenable currently for surgical repair. Ortho re-evaluated patient today, 6/29 and stated soft tissue amenable to repair and plan to take to OR tomorrow on 6/30 for left ankle fracture repair. Plan to make patient NPO after midnight tonight and will start IVF's after midnight while she is NPO.   - Fall Precautions.    Closed displaced fracture of second metatarsal bone of left foot  CT imaging on admit showed remote or subacute nondisplaced fracture of the 2nd metatarsal of left foot. Orthopedics evaluated and no treatment or intervention needed.     Primary hypertension  Patients blood pressure range in the last 24 hours was: BP  Min: 90/49  Max: 190/81.The patient's inpatient anti-hypertensive regimen is listed below:  Current Antihypertensives   None    Plan  - BP is controlled, no changes needed to their regimen. Home HCTZ and Losartan on hold as patient having lower BPs in hospital and now going to OR on 6/30 so want to hold for surgery too.   - Goal BP < 140/90.     Stage 3a chronic kidney disease  Present on admit. No previous diagnosis in chart but GFR appears to have been 40-50 since 2020. Creatine stable for now. BMP reviewed- noted Estimated Creatinine Clearance: 49.9 mL/min (based on SCr of 0.8 mg/dL). according to latest data. Based on current GFR, CKD stage is stage 3a. Monitor UOP and serial BMP and adjust therapy as needed. Renally dose meds. Avoid nephrotoxic medications and procedures.  Acquired hypothyroidism  Patient has chronic hypothyroidism. TFTs reviewed-   Lab Results   Component Value Date    TSH 2.778 01/21/2023     Will continue chronic Levothyroxine to treat and adjust for and clinical changes.  Primary osteoarthritis of left knee  Chronic condition and controlled.  Patient on Celebrex and PRN Voltaren gel and Skelaxin at home to treat and holding in hospital for now.     Anxiety and depression  Patient has recurrent depression which is moderate and is currently controlled. Will Continue anti-depressant medications with Lexapro to treat his depression and anxiety. We will not consult psychiatry at this time. Patient does not display psychosis at this time. Continue to monitor closely and adjust plan of care as needed.    Pure hypercholesterolemia  Chronic and controlled. Continue Lipitor to treat. Monitor clinically. Last LDL was   Lab Results   Component Value Date    LDLCALC 94 12/16/2020      Iron deficiency anemia secondary to inadequate dietary iron intake  Controlled. Anemia is likely due to iron deficiency, suspect poor intake per  as up to date on C scope/ pap as not longer requires as previous normal and age. No bleeding reprted nor dark stools. Most recent hemoglobin and hematocrit are listed below.  Recent Labs     06/27/25  0429 06/28/25  0718   HGB 10.9* 9.8*   HCT 33.0* 29.9*     Plan  - Transfuse PRBC if patient becomes hemodynamically unstable, symptomatic or H/H drops below 7/21.  - Patient has not received any PRBC transfusions to date.  - Patient's anemia is currently stable.  - Continue oral iron replacement to treat.   Chronic headaches  Chronic and controlled. Continue home Imitrex PRN to treat.     Other reduced mobility  Related to ankle fracture. PT/OT consulted and working with patient. Progressive mobility protocol initiated. Fall precautions.     VTE Risk Mitigation (From admission, onward)           Ordered     Place sequential compression device  Until discontinued         06/29/25 1246     IP VTE HIGH RISK PATIENT  Once         06/29/25 1246     Place sequential compression device  Until discontinued         06/21/25 2305                    Discharge Planning   MANINDER: 7/2/2025     Code Status: Full Code   Discharge Plan A: Rehab         Carmita Zuniga MD  Department of [facility] Medicine   [facility] - Surgery

## 2025-06-29 NOTE — ASSESSMENT & PLAN NOTE
Caity Ashby is a 85 y.o. female with a left trimalleolar ankle fracture.  Presented closed, neurovascularly intact, and without any other musculoskeletal injuries on physical exam.  Closed reduction was performed in the emergency department.  The patient was admitted given concern for her to safely remain nonweightbearing at home.    She has been mobilizing with therapy and keeping the leg iced and elevated while at rest.  She developed fracture blisters, but the swelling is beginning to improve.       Plan:   NWB to the LLE  DVT prophylaxis: ASA 81mg bid  Aggressively ice and elevate the LLE on a double stack of sheets at all times when in bed or in the chair.  Multimodal pain control, limiting narcotics.  NPO midnight    Dispo: Skin amenable to surgery. Plan for surgery tomorrow 06/30

## 2025-06-29 NOTE — SUBJECTIVE & OBJECTIVE
Interval History: Ortho re-evaluated patient today and stated soft tissue amenable to repair and plan to take to OR tomorrow on 6/30 for left ankle fracture repair. Plan to make patient NPO after midnight tonight and will start IVF's after midnight while she is NPO. Patient's  at bedside and patient and  aware of plan.  upset as he states his wife did not get good care overnight from night nursing team and he states her purewick came loose several times and she wet the bed. He stated she did not have those problems with day nurses just night nurses. I informed charge nurse on erinn, Saul this morning and he went and spoke with  and patient about overnight nursing issues.     Review of Systems   Constitutional:  Negative for fever.   Respiratory:  Negative for cough and shortness of breath.    Cardiovascular:  Negative for chest pain.   Gastrointestinal:  Negative for abdominal pain and nausea.   Musculoskeletal:  Positive for arthralgias (Left ankle).   Psychiatric/Behavioral:  Negative for agitation and confusion.      Objective:     Vital Signs (Most Recent):  Temp: 97.5 °F (36.4 °C) (06/29/25 0549)  Pulse: 68 (06/29/25 0549)  Resp: 18 (06/29/25 0549)  BP: 151/70 (06/29/25 0549)  SpO2: 96 % (06/29/25 0549) on room air Vital Signs (24h Range):  Temp:  [97.5 °F (36.4 °C)-98.2 °F (36.8 °C)] 97.5 °F (36.4 °C)  Pulse:  [68-78] 68  Resp:  [17-18] 18  SpO2:  [92 %-97 %] 96 %  BP: (134-171)/(66-70) 161/70     Weight: 60.8 kg (133 lb 15.9 oz)  Body mass index is 21.63 kg/m².    Intake/Output Summary (Last 24 hours) at 6/29/2025 1534  Last data filed at 6/29/2025 1026  Gross per 24 hour   Intake --   Output 700 ml   Net -700 ml         Physical Exam  Vitals and nursing note reviewed.   Constitutional:       General: She is awake. She is not in acute distress.     Appearance: Normal appearance. She is well-developed and normal weight. She is not ill-appearing.      Comments: Patient sitting up in  bedside chair in no distress and  at bedside. Patient awake and alert and oriented x 4.    Cardiovascular:      Rate and Rhythm: Normal rate and regular rhythm.      Heart sounds: Normal heart sounds. No murmur heard.  Pulmonary:      Effort: Pulmonary effort is normal. No respiratory distress.      Breath sounds: Normal breath sounds. No wheezing.   Abdominal:      General: Abdomen is flat. Bowel sounds are normal. There is no distension.      Palpations: Abdomen is soft.      Tenderness: There is no abdominal tenderness.   Musculoskeletal:      Left lower leg: No edema.      Comments: Left ankle in short splint   Skin:     General: Skin is warm.      Findings: No erythema.   Neurological:      Mental Status: She is alert and oriented to person, place, and time.   Psychiatric:         Mood and Affect: Mood normal.         Behavior: Behavior normal. Behavior is cooperative.         Thought Content: Thought content normal.         Judgment: Judgment normal.               Significant Labs: All pertinent labs within the past 24 hours have been reviewed.    Significant Imaging: I have reviewed all pertinent imaging results/findings within the past 24 hours.

## 2025-06-29 NOTE — ASSESSMENT & PLAN NOTE
"Patient was walking down her stairs when she accidentally missed the bottom step causing her left foot to go under her as she fell to the ground. Patient unable to bear weight to left foot and ankle so EMS called and patient brought to ED. Imaging in ED showed "Trimalleolar ankle fracture with near anatomic alignment. Remote or subacute nondisplaced fracture of the 2nd metatarsal." Fracture related to osteoporosis.   - Orthopedics consulted in ED and left ankle reduced and splinted in ED.   - Patient placed non weight bearing to left lower extremity as per Ortho.   - Left leg elevated and iced with plans for operative fixation of left ankle fracture once swelling improves.   - PT/OT consulted while patient in hospital and recommending high intensity on discharge when medically ready and plan discharge to [facility] when medically ready after her ankle fracture surgery.   - Continue on multimodals for pain management with Tylenol 1000 mg po every 8 hours Robaxin 500 mg po TID as pain controlled to left ankle.   - Continue on Aspirin 81 mg po BID for DVT prophylaxis.   - Ice and keep left ankle elevated at all times to help with soft tissue swelling. Orthopedics re-evaluated left ankle on 6/27 and stated left ankle too swollen and not amenable currently for surgical repair. Ortho re-evaluated patient today, 6/29 and stated soft tissue amenable to repair and plan to take to OR tomorrow on 6/30 for left ankle fracture repair. Plan to make patient NPO after midnight tonight and will start IVF's after midnight while she is NPO.   - Fall Precautions.    "

## 2025-06-30 ENCOUNTER — ANESTHESIA EVENT (OUTPATIENT)
Dept: SURGERY | Facility: HOSPITAL | Age: 85
End: 2025-06-30
Payer: MEDICARE

## 2025-06-30 PROCEDURE — 25000003 PHARM REV CODE 250: Performed by: HOSPITALIST

## 2025-06-30 PROCEDURE — 25000003 PHARM REV CODE 250: Performed by: NURSE PRACTITIONER

## 2025-06-30 PROCEDURE — 25000003 PHARM REV CODE 250: Performed by: INTERNAL MEDICINE

## 2025-06-30 PROCEDURE — 11000001 HC ACUTE MED/SURG PRIVATE ROOM

## 2025-06-30 RX ORDER — SODIUM CHLORIDE 9 MG/ML
INJECTION, SOLUTION INTRAVENOUS CONTINUOUS
Status: DISCONTINUED | OUTPATIENT
Start: 2025-07-01 | End: 2025-07-01

## 2025-06-30 RX ORDER — DOCUSATE SODIUM 100 MG/1
200 CAPSULE, LIQUID FILLED ORAL ONCE
Status: COMPLETED | OUTPATIENT
Start: 2025-06-30 | End: 2025-06-30

## 2025-06-30 RX ADMIN — OXYCODONE HYDROCHLORIDE 10 MG: 10 TABLET ORAL at 07:06

## 2025-06-30 RX ADMIN — PREGABALIN 50 MG: 50 CAPSULE ORAL at 09:06

## 2025-06-30 RX ADMIN — ACETAMINOPHEN 1000 MG: 500 TABLET ORAL at 09:06

## 2025-06-30 RX ADMIN — Medication 6 MG: at 09:06

## 2025-06-30 RX ADMIN — METHOCARBAMOL 500 MG: 500 TABLET ORAL at 09:06

## 2025-06-30 RX ADMIN — OXYCODONE HYDROCHLORIDE 10 MG: 10 TABLET ORAL at 05:06

## 2025-06-30 RX ADMIN — ASPIRIN 81 MG: 81 TABLET, COATED ORAL at 09:06

## 2025-06-30 RX ADMIN — LEVOTHYROXINE SODIUM 125 MCG: 0.12 TABLET ORAL at 05:06

## 2025-06-30 RX ADMIN — ACETAMINOPHEN 1000 MG: 500 TABLET ORAL at 05:06

## 2025-06-30 RX ADMIN — SENNOSIDES AND DOCUSATE SODIUM 1 TABLET: 50; 8.6 TABLET ORAL at 09:06

## 2025-06-30 RX ADMIN — FERROUS SULFATE TAB EC 325 MG (65 MG FE EQUIVALENT) 1 EACH: 325 (65 FE) TABLET DELAYED RESPONSE at 09:06

## 2025-06-30 RX ADMIN — OXYCODONE HYDROCHLORIDE 10 MG: 10 TABLET ORAL at 02:06

## 2025-06-30 RX ADMIN — ESCITALOPRAM OXALATE 10 MG: 10 TABLET ORAL at 09:06

## 2025-06-30 RX ADMIN — SODIUM CHLORIDE: 9 INJECTION, SOLUTION INTRAVENOUS at 12:06

## 2025-06-30 RX ADMIN — ATORVASTATIN CALCIUM 20 MG: 20 TABLET, FILM COATED ORAL at 09:06

## 2025-06-30 RX ADMIN — ACETAMINOPHEN 1000 MG: 500 TABLET ORAL at 02:06

## 2025-06-30 RX ADMIN — DOCUSATE SODIUM 200 MG: 100 CAPSULE, LIQUID FILLED ORAL at 09:06

## 2025-06-30 NOTE — ASSESSMENT & PLAN NOTE
Patients blood pressure range in the last 24 hours was: BP  Min: 90/49  Max: 190/81.The patient's inpatient anti-hypertensive regimen is listed below:  Current Antihypertensives   None    Plan  - BP is controlled, no changes needed to their regimen. Home HCTZ and Losartan on hold as patient having lower BPs in hospital and now going to OR on 7/1 so want to hold for surgery too.   - Goal BP < 140/90.

## 2025-06-30 NOTE — PROGRESS NOTES
[facility] - Surgery  Orthopedics  Progress Note    Patient Name: Caity Ashby  MRN: [id number]  Admission Date: 6/21/2025  Hospital Length of Stay: 6 days  Attending Provider: Carmita Zuniga MD  Primary Care Provider: Erasmo Jones MD  Follow-up For: Procedure(s) (LRB):  ORIF, ANKLE, LEFT, Diving board, Bone foam, Angelica, C-arm clock side (Left)    Post-Operative Day:    Subjective:     Principal Problem:Closed displaced trimalleolar fracture of left ankle    Principal Orthopedic Problem:  As above    Interval History:  Afebrile, hemodynamically stable.  Pain is well-controlled.  Understands the plan for ORIF left ankle tomorrow.    Review of patient's allergies indicates:   Allergen Reactions    Boniva [ibandronate] Other (See Comments)     SHOULDER PAIN    Demerol [meperidine]     Ramipril Swelling       Current Facility-Administered Medications   Medication    acetaminophen tablet 1,000 mg    aspirin EC tablet 81 mg    atorvastatin tablet 20 mg    dextrose 50% injection 12.5 g    dextrose 50% injection 25 g    EScitalopram oxalate tablet 10 mg    ferrous sulfate tablet 1 each    glucagon (human recombinant) injection 1 mg    glucose chewable tablet 16 g    glucose chewable tablet 24 g    levothyroxine tablet 125 mcg    melatonin tablet 6 mg    methocarbamoL tablet 500 mg    morphine injection 2 mg    naloxone 0.4 mg/mL injection 0.02 mg    ondansetron injection 4 mg    oxyCODONE immediate release tablet 5 mg    oxyCODONE immediate release tablet Tab 10 mg    pregabalin capsule 50 mg    sodium chloride 0.9% flush 10 mL    sumatriptan tablet 100 mg     Objective:     Vital Signs (Most Recent):  Temp: 97.9 °F (36.6 °C) (06/30/25 1224)  Pulse: 72 (06/30/25 1224)  Resp: 16 (06/30/25 1435)  BP: 133/60 (06/30/25 1224)  SpO2: 95 % (06/30/25 1224) Vital Signs (24h Range):  Temp:  [97.4 °F (36.3 °C)-98.3 °F (36.8 °C)] 97.9 °F (36.6 °C)  Pulse:  [61-82] 72  Resp:  [14-18] 16  SpO2:  [94 %-97 %] 95 %  BP:  "(127-172)/(59-75) 133/60     Weight: 60.8 kg (133 lb 15.9 oz)  Height: 5' 6" (167.6 cm)  Body mass index is 21.63 kg/m².      Intake/Output Summary (Last 24 hours) at 6/30/2025 1558  Last data filed at 6/30/2025 0549  Gross per 24 hour   Intake --   Output 1000 ml   Net -1000 ml        Ortho/SPM Exam  Gen: NAD, sitting comfortably in bed  CV: regular rate  Resp: non-labored respirations    LLE:   Short-leg splint in place.  Splint iced and elevated.    Splint windowed, there is a serous blister over the anterior aspect of the distal tibia.  The skin of the anterior and lateral aspect of the distal leg wrinkle   Swelling significantly improved  Sensation intact to light touch throughout.    Wiggles all toes.  Brisk capillary refill to all toes     Significant Labs: All pertinent labs within the past 24 hours have been reviewed.    Significant Imaging: I have reviewed all pertinent imaging results/findings.  Assessment/Plan:     * Closed displaced trimalleolar fracture of left ankle  Caity Ashby is a 85 y.o. female with a left trimalleolar ankle fracture.  Presented closed, neurovascularly intact, and without any other musculoskeletal injuries on physical exam.  Closed reduction was performed in the emergency department.  The patient was admitted given concern for her to safely remain nonweightbearing at home.    She has been mobilizing with therapy and keeping the leg iced and elevated while at rest.  She continues to have a serous fracture blister over the anterior aspect of the distal leg.  She has moderate swelling throughout; however, her skin wrinkles, is mobile, in his amenable to surgery.  Plan for ORIF left ankle tomorrow, 07/01/2025.     Plan:   NWB to the LLE  DVT prophylaxis: ASA 81mg bid  Aggressively ice and elevate the LLE on a double stack of sheets at all times when in bed or in the chair.  Multimodal pain control, limiting narcotics.  NPO 2300 for surgery tomorrow    Dispo: Skin amenable to " surgery. Plan for surgery tomorrow 07/01      NADIRA Forrester MD  Orthopedics  [facility] - [facility]

## 2025-06-30 NOTE — ANESTHESIA PREPROCEDURE EVALUATION
"[facility]  Anesthesia Pre-Operative Evaluation         Patient Name: Caity Ashby  YOB: 1940  MRN: [id number]    SUBJECTIVE:     Pre-operative evaluation for Procedure(s) (LRB):  ORIF, ANKLE, LEFT, Diving board, Bone foam, [place], C-arm clock side (Left)     06/30/2025    Caity Ashby is a 85 y.o. female with a significant medical history of HTN, hypothyroid, CKD3, anemia who presents for the above procedure.    LDA:   Female External Urinary Catheter w/ Suction 06/30/25 0029 (Active)   Output (mL) 200 mL 06/30/25 0029   Number of days: 0       Prev airway: None documented.    Drips: None documented.      Problem List[1]    Review of patient's allergies indicates:   Allergen Reactions    Boniva [ibandronate] Other (See Comments)     SHOULDER PAIN    Demerol [meperidine]     Ramipril Swelling       Current Inpatient Medications:   acetaminophen  1,000 mg Oral Q8H    aspirin  81 mg Oral BID    atorvastatin  20 mg Oral QHS    EScitalopram oxalate  10 mg Oral Daily    ferrous sulfate  1 tablet Oral BID    levothyroxine  125 mcg Oral Before breakfast    methocarbamoL  500 mg Oral TID    pregabalin  50 mg Oral BID       Medications Ordered Prior to Encounter[2]    Past Surgical History:   Procedure Laterality Date    COSMETIC SURGERY      HYSTERECTOMY         Social History[3]    OBJECTIVE:     Vital Signs Range:      1/21/2023     8:55 AM 6/21/2025     4:45 PM 6/21/2025     5:08 PM   Vitals - 1 value per visit   SYSTOLIC  165    DIASTOLIC  76    Pulse  80    Temp  36.9 °C (98.5 °F)    Resp  18    SPO2  97 %    Weight (lb)   134   Weight (kg)   60.782   Height 5' 6" (1.676 m)  5' 6" (1.676 m)   BMI (Calculated)   21.6         CBC:   Lab Results   Component Value Date    WBC 7.64 06/28/2025    HGB 9.8 (L) 06/28/2025    HCT 29.9 (L) 06/28/2025     (H) 06/28/2025     06/28/2025         CMP:   Sodium   Date Value Ref Range Status   06/28/2025 137 136 - 145 " mmol/L Final   05/18/2024 135 135 - 146 mmol/L Final   01/21/2023 136 136 - 145 mmol/L Final     Potassium   Date Value Ref Range Status   06/28/2025 3.9 3.5 - 5.1 mmol/L Final   05/18/2024 3.5 (L) 3.6 - 5.2 mmol/L Final   01/21/2023 3.4 (L) 3.5 - 5.1 mmol/L Final     Chloride   Date Value Ref Range Status   06/28/2025 106 95 - 110 mmol/L Final   01/21/2023 99 95 - 110 mmol/L Final     CO2   Date Value Ref Range Status   06/28/2025 24 23 - 29 mmol/L Final   01/21/2023 25 23 - 29 mmol/L Final     Carbon Dioxide   Date Value Ref Range Status   05/18/2024 23 (L) 24 - 32 mmol/L Final     Glucose   Date Value Ref Range Status   06/28/2025 88 70 - 110 mg/dL Final   01/21/2023 111 (H) 70 - 110 mg/dL Final     BUN   Date Value Ref Range Status   06/28/2025 16 8 - 23 mg/dL Final     Creatinine   Date Value Ref Range Status   06/28/2025 0.8 0.5 - 1.4 mg/dL Final     Calcium   Date Value Ref Range Status   06/28/2025 7.9 (L) 8.7 - 10.5 mg/dL Final   05/18/2024 8.9 8.4 - 10.3 mg/dL Final   01/21/2023 9.2 8.7 - 10.5 mg/dL Final     Protein Total   Date Value Ref Range Status   06/28/2025 5.5 (L) 6.0 - 8.4 gm/dL Final     Total Protein   Date Value Ref Range Status   01/21/2023 6.4 6.0 - 8.4 g/dL Final     Albumin   Date Value Ref Range Status   06/28/2025 2.4 (L) 3.5 - 5.2 g/dL Final   05/18/2024 4.2 3.4 - 5.0 g/dL Final   01/21/2023 3.3 (L) 3.5 - 5.2 g/dL Final     Total Bilirubin   Date Value Ref Range Status   05/18/2024 0.6 <1.3 mg/dL Final   01/21/2023 0.3 0.1 - 1.0 mg/dL Final     Comment:     For infants and newborns, interpretation of results should be based  on gestational age, weight and in agreement with clinical  observations.    Premature Infant recommended reference ranges:  Up to 24 hours.............<8.0 mg/dL  Up to 48 hours............<12.0 mg/dL  3-5 days..................<15.0 mg/dL  6-29 days.................<15.0 mg/dL       Bilirubin Total   Date Value Ref Range Status   06/28/2025 0.3 0.1 - 1.0 mg/dL  Final     Comment:     For infants and newborns, interpretation of results should be based   on gestational age, weight and in agreement with clinical   observations.    Premature Infant recommended reference ranges:   0-24 hours:  <8.0 mg/dL   24-48 hours: <12.0 mg/dL   3-5 days:    <15.0 mg/dL   6-29 days:   <15.0 mg/dL     Alkaline Phosphatase   Date Value Ref Range Status   01/21/2023 48 (L) 55 - 135 U/L Final     ALP   Date Value Ref Range Status   06/28/2025 52 40 - 150 unit/L Final     AST   Date Value Ref Range Status   06/28/2025 20 11 - 45 unit/L Final   05/18/2024 24 <45 U/L Final   01/21/2023 22 10 - 40 U/L Final     ALT   Date Value Ref Range Status   06/28/2025 13 10 - 44 unit/L Final   05/18/2024 15 <46 U/L Final   01/21/2023 14 10 - 44 U/L Final     Anion Gap   Date Value Ref Range Status   06/28/2025 7 (L) 8 - 16 mmol/L Final     eGFR if    Date Value Ref Range Status   12/16/2020 57 (L) > OR = 60 mL/min/1.73m2 Final     eGFR if non    Date Value Ref Range Status   12/16/2020 49 (L) > OR = 60 mL/min/1.73m2 Final       INR:  Lab Results   Component Value Date    INR 1.1 06/22/2025    INR 1.0 05/18/2024    PROTIME 11.7 06/22/2025       EKG:   Results for orders placed or performed during the hospital encounter of 06/21/25   EKG 12-lead    Collection Time: 06/21/25 11:11 PM   Result Value Ref Range    QRS Duration 88 ms    [facility] QTC Calculation 459 ms    Narrative    Test Reason : Z01.818,    Vent. Rate :  72 BPM     Atrial Rate :  72 BPM     P-R Int : 150 ms          QRS Dur :  88 ms      QT Int : 420 ms       P-R-T Axes :  61  77  58 degrees    QTcB Int : 459 ms    Normal sinus rhythm  Normal ECG  When compared with ECG of 21-Jan-2023 09:36,  Minor change in Pwave axis  Confirmed by Eloy Clemons ([id number]) on 6/22/2025 9:12:10 AM    Referred By: AAAREFERRAL SELF           Confirmed By: Eloy Clemons        2D ECHO:   No results found for this or any previous visit.          ASSESSMENT/PLAN:           Pre-op Assessment    I have reviewed the Patient Summary Reports.     I have reviewed the Nursing Notes. I have reviewed the NPO Status.   I have reviewed the Medications.     Review of Systems  Anesthesia Hx:             Denies Family Hx of Anesthesia complications.    Denies Personal Hx of Anesthesia complications.                    Hematology/Oncology:       -- Anemia:                                  Cardiovascular:     Hypertension   Denies MI.         Denies CHF.                                   Pulmonary:    Denies COPD.  Denies Asthma.                    Renal/:  Chronic Renal Disease, CKD                Hepatic/GI:      Denies Liver Disease.               Musculoskeletal:  Arthritis               Neurological:    Denies CVA.    Denies Seizures.                                Endocrine:  Denies Diabetes. Hypothyroidism          Psych:  Psychiatric History anxiety depression                Physical Exam  General: Well nourished, Cooperative and Alert    Airway:  Mallampati: II   Mouth Opening: Normal  TM Distance: Normal  Tongue: Normal  Neck ROM: Normal ROM    Dental:  Intact    Chest/Lungs:  Clear to auscultation, Normal Respiratory Rate    Heart:  Rate: Normal  Rhythm: Regular Rhythm  Sounds: Normal        Anesthesia Plan  Type of Anesthesia, risks & benefits discussed:    Anesthesia Type: Gen ETT, Regional, Gen Natural Airway, Gen Supraglottic Airway  Intra-op Monitoring Plan: Standard ASA Monitors  Post Op Pain Control Plan: multimodal analgesia  Induction:  IV  Airway Plan: Direct, Post-Induction  Informed Consent: Informed consent signed with the Patient and all parties understand the risks and agree with anesthesia plan.  All questions answered.   ASA Score: 3  Day of Surgery Review of History & Physical: H&P Update referred to the surgeon/provider.    Ready For Surgery From Anesthesia Perspective.     .           [1]   Patient Active Problem List  Diagnosis     Primary hypertension    Acquired hypothyroidism    Calcium channel blocker adverse reaction    Primary osteoarthritis of left knee    Angioedema due to angiotensin converting enzyme inhibitor (ACE-I)    Closed displaced trimalleolar fracture of left ankle    Pure hypercholesterolemia    Stage 3a chronic kidney disease    Anxiety and depression    Closed displaced fracture of second metatarsal bone of left foot    Pathological fracture of left ankle due to osteoporosis with routine healing    Iron deficiency anemia secondary to inadequate dietary iron intake    Chronic headaches    Other reduced mobility   [2]   No current facility-administered medications on file prior to encounter.     Current Outpatient Medications on File Prior to Encounter   Medication Sig Dispense Refill    diclofenac (VOLTAREN) 75 MG EC tablet Take 75 mg by mouth 2 (two) times daily.      escitalopram oxalate (LEXAPRO) 10 MG tablet Take 10 mg by mouth.      estrogens, conjugated, (PREMARIN) 0.625 MG tablet Take 0.625 mg by mouth once daily.      finasteride (PROPECIA) 1 mg tablet Take 1 mg by mouth.   0    levothyroxine (SYNTHROID) 125 MCG tablet TAKE 1 TABLET BY MOUTH EVERY MORNING 90 tablet 0    losartan (COZAAR) 100 MG tablet TAKE 1 TABLET BY MOUTH EVERY DAY 90 tablet 3    meloxicam (MOBIC) 15 MG tablet Take 15 mg by mouth once daily.      sumatriptan (IMITREX) 100 MG tablet       atorvastatin (LIPITOR) 20 MG tablet Take 20 mg by mouth every evening.      EPINEPHrine (EPIPEN) 0.3 mg/0.3 mL AtIn Inject 0.3 mLs (0.3 mg total) into the muscle once. for 1 dose 2 Device 5    hydroCHLOROthiazide (HYDRODIURIL) 12.5 MG Tab TAKE 1 TABLET BY MOUTH EVERY DAY 30 tablet 6    metaxalone (SKELAXIN) 800 MG tablet Take 800 mg by mouth 3 (three) times daily as needed for Pain.      valACYclovir (VALTREX) 500 MG tablet TAKE 1 TABLET BY MOUTH EVERY  tablet 0   [3]   Social History  Socioeconomic History    Marital status:    Tobacco Use     Smoking status: Never    Smokeless tobacco: Never   Substance and Sexual Activity    Alcohol use: Not Currently    Drug use: Never    Sexual activity: Yes     Partners: Male     Social Drivers of Health     Financial Resource Strain: Low Risk  (6/21/2025)    Overall Financial Resource Strain (CARDIA)     Difficulty of Paying Living Expenses: Not very hard   Food Insecurity: No Food Insecurity (6/21/2025)    Hunger Vital Sign     Worried About Running Out of Food in the Last Year: Never true     Ran Out of Food in the Last Year: Never true   Transportation Needs: No Transportation Needs (6/21/2025)    PRAPARE - Transportation     Lack of Transportation (Medical): No     Lack of Transportation (Non-Medical): No   Physical Activity: Inactive (6/21/2025)    Exercise Vital Sign     Days of Exercise per Week: 0 days     Minutes of Exercise per Session: 0 min   Stress: No Stress Concern Present (6/21/2025)    [employer] [place] [employer] - Occupational Stress Questionnaire     Feeling of Stress : Not at all   Housing Stability: Low Risk  (6/21/2025)    Housing Stability Vital Sign     Unable to Pay for Housing in the Last Year: No     Homeless in the Last Year: No

## 2025-06-30 NOTE — PROGRESS NOTES
[facility] Medicine  Progress Note    Patient Name: Caity Ashby  MRN: [id number]  Patient Class: IP- Inpatient   Admission Date: 6/21/2025  Length of Stay: 6 days  Attending Physician: Carmita Zuniga MD  Primary Care Provider: Erasmo Jones MD        Subjective     Principal Problem:Closed displaced trimalleolar fracture of left ankle        HPI:  Caity Ashby is a 85 y.o. female with a PMHx of HTN, HLD, arthritis, anxiety/depression, hypothyroidism, and migraines who presented to the ED for obvious LLE deformity after a fall. Patient states that she was walking down her stairs when she accidentally missed the bottom step causing her foot to go under her as she fell to the ground. Patient denies LOC or head trauma. She had immediate severe pain and a noticeable deformity to her left ankle. Denies numbness/tingling in her LLE. Denies sustaining any additional injuries. She reports prior to the fall she was in her normal state of health and denies CP, SOB, cough, fever/chills, abdominal pain, N/V/D, or urinary complaints.    In the ED, hypertensive initially otherwise VSS, afebrile. CBC unremarkable. Na 135. K 3.1. Bicarb 20. Anion gap 17. Cr 1.3 (bl~1.1). Calcium 10.6. Xray L foot with fracture involving the proximal aspect of the 2nd metatarsal and possibly 3rd. No significant overlying edema, correlation with any focal tenderness is recommended as this could reflect irregular healing of prior fracture. X-ray L ankle revealed an avulsion fracture involving the distal aspect of the fibula noting displacement of the distal fracture fragment. There is posterior dislocation of the tibiotalar articulation.There is questionable fracture of the medial malleolus though not confirmed. Post reduction x-ray ankle revealing interval placement of overlying cast material, which limits fine bony and soft tissue detail. Improved alignment of ankle fracture and improved alignment of the  "ankle joint. Suspected trimalleolar ankle fracture now demonstrates anatomic alignment. CT L ankle pending. Orthopedics consulted in the ED and performed reduction of left ankle and short leg splint applied. Recommend DVT ppx with ASA bid and plan for operative fixation in the coming weeks.The patient received 2g Ancef, 4mg Zofran, 50mcg Fentanyl x2 doses.     Overview/Hospital Course:  Patient was walking down her stairs when she accidentally missed the bottom step causing her left foot to go under her as she fell to the ground. Patient unable to bear weight to left foot and ankle so EMS called and patient brought to ED. Imaging in ED showed "Trimalleolar ankle fracture with near anatomic alignment. Remote or subacute nondisplaced fracture of the 2nd metatarsal." Orthopedics consulted in ED and left ankle reduced and splinted in ED. Patient placed non weight bearing to left lower extremity. Left leg elevated and iced with plans for operative fixation of left ankle fracture once soft tissue swelling improves. PT/OT consulted while patient in hospital and recommending high intensity on discharge when medically ready and plan discharge to [facility] when medically ready after her ankle fracture surgery. Patient placed on multimodals for pain management. Patient placed on Aspirin 81 mg po BID for DVT prophylaxis. Pain controlled to left ankle. Ortho re-evaluated patient and state soft tissue amenable to repair and plan to take to OR on 7/1 with Dr. Bradley for left ankle fracture repair.     Interval History: Patient on schedule for tomorrow with Ortho at 9:30 am with Dr. Bradley for left ankle fracture repair. Patient to be NPO after midnight tonight and IVF's to start after she is NPO tonight. Patient and her  updated. Patient has been icing and elevating her left ankle and reports pain well controlled to her left ankle. No new labs and none needed.     Review of Systems   Constitutional:  " Negative for fever.   Respiratory:  Negative for cough and shortness of breath.    Cardiovascular:  Negative for chest pain.   Gastrointestinal:  Negative for abdominal pain and nausea.   Musculoskeletal:  Positive for arthralgias (Left ankle).   Psychiatric/Behavioral:  Negative for agitation and confusion.      Objective:     Vital Signs (Most Recent):  Temp: 97.5 °F (36.4 °C) (06/30/25 1559)  Pulse: 72 (06/30/25 1559)  Resp: 18 (06/30/25 1559)  BP: 126/60 (06/30/25 1559)  SpO2: 93 % (06/30/25 1559) on room air Vital Signs (24h Range):  Temp:  [97.4 °F (36.3 °C)-98.3 °F (36.8 °C)] 97.5 °F (36.4 °C)  Pulse:  [61-82] 72  Resp:  [14-18] 18  SpO2:  [93 %-97 %] 93 %  BP: (126-172)/(59-75) 126/60     Weight: 60.8 kg (133 lb 15.9 oz)  Body mass index is 21.63 kg/m².    Intake/Output Summary (Last 24 hours) at 6/30/2025 1629  Last data filed at 6/30/2025 0549  Gross per 24 hour   Intake --   Output 1000 ml   Net -1000 ml         Physical Exam  Vitals and nursing note reviewed.   Constitutional:       General: She is awake. She is not in acute distress.     Appearance: Normal appearance. She is well-developed and normal weight. She is not ill-appearing.      Comments: Patient laying in bed and left leg elevated and  at bedside. Patient awake and alert and oriented x 4.    Cardiovascular:      Rate and Rhythm: Normal rate and regular rhythm.      Heart sounds: Normal heart sounds. No murmur heard.  Pulmonary:      Effort: Pulmonary effort is normal. No respiratory distress.      Breath sounds: Normal breath sounds. No wheezing.   Abdominal:      General: Abdomen is flat. Bowel sounds are normal. There is no distension.      Palpations: Abdomen is soft.      Tenderness: There is no abdominal tenderness.   Musculoskeletal:      Left lower leg: No edema.      Comments: Left ankle in short splint   Skin:     General: Skin is warm.      Findings: No erythema.   Neurological:      Mental Status: She is alert and oriented  "to person, place, and time.   Psychiatric:         Mood and Affect: Mood normal.         Behavior: Behavior normal. Behavior is cooperative.         Thought Content: Thought content normal.         Judgment: Judgment normal.               Significant Labs: All pertinent labs within the past 24 hours have been reviewed.    Significant Imaging: I have reviewed all pertinent imaging results/findings within the past 24 hours.      Assessment & Plan  Closed displaced trimalleolar fracture of left ankle  Pathological fracture of left ankle due to osteoporosis with routine healing  Patient was walking down her stairs when she accidentally missed the bottom step causing her left foot to go under her as she fell to the ground. Patient unable to bear weight to left foot and ankle so EMS called and patient brought to ED. Imaging in ED showed "Trimalleolar ankle fracture with near anatomic alignment. Remote or subacute nondisplaced fracture of the 2nd metatarsal." Fracture related to osteoporosis.   - Orthopedics consulted in ED and left ankle reduced and splinted in ED.   - Patient placed non weight bearing to left lower extremity as per Ortho.   - Left leg elevated and iced with plans for operative fixation of left ankle fracture once swelling improves.   - PT/OT consulted while patient in hospital and recommending high intensity on discharge when medically ready and plan discharge to [facility] when medically ready after her ankle fracture surgery.   - Continue on multimodals for pain management with Tylenol 1000 mg po every 8 hours Robaxin 500 mg po TID as pain controlled to left ankle. Pain controlled.   - Continue on Aspirin 81 mg po BID for DVT prophylaxis.   - Ice and keep left ankle elevated at all times to help with soft tissue swelling. Orthopedics re-evaluated left ankle on 6/27 and stated left ankle too swollen and not amenable currently for surgical repair. Patient on schedule for tomorrow, 7/1 with " Ortho at 9:30 am with Dr. Bradley for left ankle fracture repair. Patient to be NPO after midnight tonight and IVF's to start after she is NPO tonight. Patient and her  updated.   - Fall Precautions.    Closed displaced fracture of second metatarsal bone of left foot  CT imaging on admit showed remote or subacute nondisplaced fracture of the 2nd metatarsal of left foot. Orthopedics evaluated and no treatment or intervention needed.     Primary hypertension  Patients blood pressure range in the last 24 hours was: BP  Min: 90/49  Max: 190/81.The patient's inpatient anti-hypertensive regimen is listed below:  Current Antihypertensives   None    Plan  - BP is controlled, no changes needed to their regimen. Home HCTZ and Losartan on hold as patient having lower BPs in hospital and now going to OR on 7/1 so want to hold for surgery too.   - Goal BP < 140/90.     Stage 3a chronic kidney disease  Present on admit. No previous diagnosis in chart but GFR appears to have been 40-50 since 2020. Creatine stable for now. BMP reviewed- noted Estimated Creatinine Clearance: 49.9 mL/min (based on SCr of 0.8 mg/dL). according to latest data. Based on current GFR, CKD stage is stage 3a. Monitor UOP and serial BMP and adjust therapy as needed. Renally dose meds. Avoid nephrotoxic medications and procedures.  Acquired hypothyroidism  Patient has chronic hypothyroidism. TFTs reviewed-   Lab Results   Component Value Date    TSH 2.778 01/21/2023     Will continue chronic Levothyroxine to treat and adjust for and clinical changes.  Primary osteoarthritis of left knee  Chronic condition and controlled. Patient on Celebrex and PRN Voltaren gel and Skelaxin at home to treat and holding in hospital for now.     Anxiety and depression  Patient has recurrent depression which is moderate and is currently controlled. Will Continue anti-depressant medications with Lexapro to treat his depression and anxiety. We will not consult psychiatry  at this time. Patient does not display psychosis at this time. Continue to monitor closely and adjust plan of care as needed.    Pure hypercholesterolemia  Chronic and controlled. Continue Lipitor to treat. Monitor clinically. Last LDL was   Lab Results   Component Value Date    LDLCALC 94 12/16/2020      Iron deficiency anemia secondary to inadequate dietary iron intake  Controlled. Anemia is likely due to iron deficiency, suspect poor intake per  as up to date on C scope/ pap as not longer requires as previous normal and age. No bleeding reprted nor dark stools. Most recent hemoglobin and hematocrit are listed below.  Recent Labs     06/28/25  0718   HGB 9.8*   HCT 29.9*     Plan  - Transfuse PRBC if patient becomes hemodynamically unstable, symptomatic or H/H drops below 7/21.  - Patient has not received any PRBC transfusions to date.  - Patient's anemia is currently stable.  - Continue oral iron replacement to treat.   Chronic headaches  Chronic and controlled. Continue home Imitrex PRN to treat.     Other reduced mobility  Related to ankle fracture. PT/OT consulted and working with patient. Progressive mobility protocol initiated. Fall precautions.     VTE Risk Mitigation (From admission, onward)           Ordered     Place sequential compression device  Until discontinued         06/29/25 1246     IP VTE HIGH RISK PATIENT  Once         06/29/25 1246     Place sequential compression device  Until discontinued         06/21/25 2305                    Discharge Planning   MANINDER: 7/2/2025     Code Status: Full Code   Discharge Plan A: Rehab      Carmita Zuniga MD  Department of Hospital Medicine   [facility] - Surgery

## 2025-06-30 NOTE — ASSESSMENT & PLAN NOTE
"Patient was walking down her stairs when she accidentally missed the bottom step causing her left foot to go under her as she fell to the ground. Patient unable to bear weight to left foot and ankle so EMS called and patient brought to ED. Imaging in ED showed "Trimalleolar ankle fracture with near anatomic alignment. Remote or subacute nondisplaced fracture of the 2nd metatarsal." Fracture related to osteoporosis.   - Orthopedics consulted in ED and left ankle reduced and splinted in ED.   - Patient placed non weight bearing to left lower extremity as per Ortho.   - Left leg elevated and iced with plans for operative fixation of left ankle fracture once swelling improves.   - PT/OT consulted while patient in hospital and recommending high intensity on discharge when medically ready and plan discharge to [facility] when medically ready after her ankle fracture surgery.   - Continue on multimodals for pain management with Tylenol 1000 mg po every 8 hours Robaxin 500 mg po TID as pain controlled to left ankle. Pain controlled.   - Continue on Aspirin 81 mg po BID for DVT prophylaxis.   - Ice and keep left ankle elevated at all times to help with soft tissue swelling. Orthopedics re-evaluated left ankle on 6/27 and stated left ankle too swollen and not amenable currently for surgical repair. Patient on schedule for tomorrow, 7/1 with Ortho at 9:30 am with Dr. Bradley for left ankle fracture repair. Patient to be NPO after midnight tonight and IVF's to start after she is NPO tonight. Patient and her  updated.   - Fall Precautions.    "

## 2025-06-30 NOTE — ASSESSMENT & PLAN NOTE
Caity Ashby is a 85 y.o. female with a left trimalleolar ankle fracture.  Presented closed, neurovascularly intact, and without any other musculoskeletal injuries on physical exam.  Closed reduction was performed in the emergency department.  The patient was admitted given concern for her to safely remain nonweightbearing at home.    She has been mobilizing with therapy and keeping the leg iced and elevated while at rest.  She continues to have a serous fracture blister over the anterior aspect of the distal leg.  She has moderate swelling throughout; however, her skin wrinkles, is mobile, in his amenable to surgery.  Plan for ORIF left ankle tomorrow, 07/01/2025.     Plan:   NWB to the LLE  DVT prophylaxis: ASA 81mg bid  Aggressively ice and elevate the LLE on a double stack of sheets at all times when in bed or in the chair.  Multimodal pain control, limiting narcotics.  NPO midnight    Dispo: Skin amenable to surgery. Plan for surgery tomorrow 07/01

## 2025-06-30 NOTE — PLAN OF CARE
Problem: Adult Inpatient Plan of Care  Goal: Plan of Care Review  Outcome: Progressing  Goal: Patient-Specific Goal (Individualized)  Outcome: Progressing  Goal: Optimal Comfort and Wellbeing  Outcome: Progressing  Goal: Readiness for Transition of Care  Outcome: Progressing     Problem: Adult Inpatient Plan of Care  Goal: Plan of Care Review  Outcome: Progressing     Problem: Adult Inpatient Plan of Care  Goal: Optimal Comfort and Wellbeing  Outcome: Progressing   PRN medication effective for pain  Explained plan of care, verbalized understanding. Educated pt .on new medicine . No injury during shift, Side rails up x 2, call light by bedside.  Remained NPO at midnight and spouse remained at bedside attentive to patient

## 2025-06-30 NOTE — SUBJECTIVE & OBJECTIVE
Interval History: Patient on schedule for tomorrow with Ortho at 9:30 am with Dr. Bradley for left ankle fracture repair. Patient to be NPO after midnight tonight and IVF's to start after she is NPO tonight. Patient and her  updated. Patient has been icing and elevating her left ankle and reports pain well controlled to her left ankle. No new labs and none needed.     Review of Systems   Constitutional:  Negative for fever.   Respiratory:  Negative for cough and shortness of breath.    Cardiovascular:  Negative for chest pain.   Gastrointestinal:  Negative for abdominal pain and nausea.   Musculoskeletal:  Positive for arthralgias (Left ankle).   Psychiatric/Behavioral:  Negative for agitation and confusion.      Objective:     Vital Signs (Most Recent):  Temp: 97.5 °F (36.4 °C) (06/30/25 1559)  Pulse: 72 (06/30/25 1559)  Resp: 18 (06/30/25 1559)  BP: 126/60 (06/30/25 1559)  SpO2: 93 % (06/30/25 1559) on room air Vital Signs (24h Range):  Temp:  [97.4 °F (36.3 °C)-98.3 °F (36.8 °C)] 97.5 °F (36.4 °C)  Pulse:  [61-82] 72  Resp:  [14-18] 18  SpO2:  [93 %-97 %] 93 %  BP: (126-172)/(59-75) 126/60     Weight: 60.8 kg (133 lb 15.9 oz)  Body mass index is 21.63 kg/m².    Intake/Output Summary (Last 24 hours) at 6/30/2025 1627  Last data filed at 6/30/2025 0549  Gross per 24 hour   Intake --   Output 1000 ml   Net -1000 ml         Physical Exam  Vitals and nursing note reviewed.   Constitutional:       General: She is awake. She is not in acute distress.     Appearance: Normal appearance. She is well-developed and normal weight. She is not ill-appearing.      Comments: Patient laying in bed and left leg elevated and  at bedside. Patient awake and alert and oriented x 4.    Cardiovascular:      Rate and Rhythm: Normal rate and regular rhythm.      Heart sounds: Normal heart sounds. No murmur heard.  Pulmonary:      Effort: Pulmonary effort is normal. No respiratory distress.      Breath sounds: Normal breath  sounds. No wheezing.   Abdominal:      General: Abdomen is flat. Bowel sounds are normal. There is no distension.      Palpations: Abdomen is soft.      Tenderness: There is no abdominal tenderness.   Musculoskeletal:      Left lower leg: No edema.      Comments: Left ankle in short splint   Skin:     General: Skin is warm.      Findings: No erythema.   Neurological:      Mental Status: She is alert and oriented to person, place, and time.   Psychiatric:         Mood and Affect: Mood normal.         Behavior: Behavior normal. Behavior is cooperative.         Thought Content: Thought content normal.         Judgment: Judgment normal.               Significant Labs: All pertinent labs within the past 24 hours have been reviewed.    Significant Imaging: I have reviewed all pertinent imaging results/findings within the past 24 hours.

## 2025-06-30 NOTE — ASSESSMENT & PLAN NOTE
Controlled. Anemia is likely due to iron deficiency, suspect poor intake per  as up to date on C scope/ pap as not longer requires as previous normal and age. No bleeding reprted nor dark stools. Most recent hemoglobin and hematocrit are listed below.  Recent Labs     06/28/25  0718   HGB 9.8*   HCT 29.9*     Plan  - Transfuse PRBC if patient becomes hemodynamically unstable, symptomatic or H/H drops below 7/21.  - Patient has not received any PRBC transfusions to date.  - Patient's anemia is currently stable.  - Continue oral iron replacement to treat.

## 2025-06-30 NOTE — SUBJECTIVE & OBJECTIVE
"Principal Problem:Closed displaced trimalleolar fracture of left ankle    Principal Orthopedic Problem:  As above    Interval History:  Afebrile, hemodynamically stable.  Pain is well-controlled.  Understands the plan for ORIF left ankle tomorrow.    Review of patient's allergies indicates:   Allergen Reactions    Boniva [ibandronate] Other (See Comments)     SHOULDER PAIN    Demerol [meperidine]     Ramipril Swelling       Current Facility-Administered Medications   Medication    acetaminophen tablet 1,000 mg    aspirin EC tablet 81 mg    atorvastatin tablet 20 mg    dextrose 50% injection 12.5 g    dextrose 50% injection 25 g    EScitalopram oxalate tablet 10 mg    ferrous sulfate tablet 1 each    glucagon (human recombinant) injection 1 mg    glucose chewable tablet 16 g    glucose chewable tablet 24 g    levothyroxine tablet 125 mcg    melatonin tablet 6 mg    methocarbamoL tablet 500 mg    morphine injection 2 mg    naloxone 0.4 mg/mL injection 0.02 mg    ondansetron injection 4 mg    oxyCODONE immediate release tablet 5 mg    oxyCODONE immediate release tablet Tab 10 mg    pregabalin capsule 50 mg    sodium chloride 0.9% flush 10 mL    sumatriptan tablet 100 mg     Objective:     Vital Signs (Most Recent):  Temp: 97.9 °F (36.6 °C) (06/30/25 1224)  Pulse: 72 (06/30/25 1224)  Resp: 16 (06/30/25 1435)  BP: 133/60 (06/30/25 1224)  SpO2: 95 % (06/30/25 1224) Vital Signs (24h Range):  Temp:  [97.4 °F (36.3 °C)-98.3 °F (36.8 °C)] 97.9 °F (36.6 °C)  Pulse:  [61-82] 72  Resp:  [14-18] 16  SpO2:  [94 %-97 %] 95 %  BP: (127-172)/(59-75) 133/60     Weight: 60.8 kg (133 lb 15.9 oz)  Height: 5' 6" (167.6 cm)  Body mass index is 21.63 kg/m².      Intake/Output Summary (Last 24 hours) at 6/30/2025 8878  Last data filed at 6/30/2025 0549  Gross per 24 hour   Intake --   Output 1000 ml   Net -1000 ml        Ortho/SPM Exam  Gen: NAD, sitting comfortably in bed  CV: regular rate  Resp: non-labored respirations    LLE:   Short-leg " splint in place.  Splint iced and elevated.    Splint windowed, there is a serous blister over the anterior aspect of the distal tibia.  The skin of the anterior and lateral aspect of the distal leg wrinkle   Swelling significantly improved  Sensation intact to light touch throughout.    Wiggles all toes.  Brisk capillary refill to all toes     Significant Labs: All pertinent labs within the past 24 hours have been reviewed.    Significant Imaging: I have reviewed all pertinent imaging results/findings.

## 2025-06-30 NOTE — PLAN OF CARE
[facility]  Discharge Reassessment    Primary Care Provider: Erasmo Jones MD    Expected Discharge Date: 7/2/2025    Reassessment (most recent)       Discharge Reassessment - 06/30/25 1123          Discharge Reassessment    Assessment Type Discharge Planning Reassessment     Discharge Plan A Rehab     Discharge Plan B Skilled Nursing Facility     DME Needed Upon Discharge  none     Transition of Care Barriers None                   Planned definitive surgery scheduled for 7/1/25  Pending therapy recommendations

## 2025-07-01 ENCOUNTER — ANESTHESIA (OUTPATIENT)
Dept: SURGERY | Facility: HOSPITAL | Age: 85
End: 2025-07-01
Payer: COMMERCIAL

## 2025-07-01 PROCEDURE — 63600175 PHARM REV CODE 636 W HCPCS: Performed by: NURSE ANESTHETIST, CERTIFIED REGISTERED

## 2025-07-01 PROCEDURE — 36000709 HC OR TIME LEV III EA ADD 15 MIN: Performed by: STUDENT IN AN ORGANIZED HEALTH CARE EDUCATION/TRAINING PROGRAM

## 2025-07-01 PROCEDURE — 97112 NEUROMUSCULAR REEDUCATION: CPT

## 2025-07-01 PROCEDURE — 63600175 PHARM REV CODE 636 W HCPCS

## 2025-07-01 PROCEDURE — 71000015 HC POSTOP RECOV 1ST HR: Performed by: STUDENT IN AN ORGANIZED HEALTH CARE EDUCATION/TRAINING PROGRAM

## 2025-07-01 PROCEDURE — 63600175 PHARM REV CODE 636 W HCPCS: Performed by: ANESTHESIOLOGY

## 2025-07-01 PROCEDURE — 27829 TREAT LOWER LEG JOINT: CPT | Mod: 51,LT,, | Performed by: STUDENT IN AN ORGANIZED HEALTH CARE EDUCATION/TRAINING PROGRAM

## 2025-07-01 PROCEDURE — 25000003 PHARM REV CODE 250: Performed by: INTERNAL MEDICINE

## 2025-07-01 PROCEDURE — 25000003 PHARM REV CODE 250

## 2025-07-01 PROCEDURE — 27822 TREATMENT OF ANKLE FRACTURE: CPT | Mod: LT,,, | Performed by: STUDENT IN AN ORGANIZED HEALTH CARE EDUCATION/TRAINING PROGRAM

## 2025-07-01 PROCEDURE — 11000001 HC ACUTE MED/SURG PRIVATE ROOM

## 2025-07-01 PROCEDURE — 0QSK04Z REPOSITION LEFT FIBULA WITH INTERNAL FIXATION DEVICE, OPEN APPROACH: ICD-10-PCS | Performed by: STUDENT IN AN ORGANIZED HEALTH CARE EDUCATION/TRAINING PROGRAM

## 2025-07-01 PROCEDURE — 97530 THERAPEUTIC ACTIVITIES: CPT

## 2025-07-01 PROCEDURE — 25000003 PHARM REV CODE 250: Performed by: NURSE ANESTHETIST, CERTIFIED REGISTERED

## 2025-07-01 PROCEDURE — 0QSH04Z REPOSITION LEFT TIBIA WITH INTERNAL FIXATION DEVICE, OPEN APPROACH: ICD-10-PCS | Performed by: STUDENT IN AN ORGANIZED HEALTH CARE EDUCATION/TRAINING PROGRAM

## 2025-07-01 PROCEDURE — 97168 OT RE-EVAL EST PLAN CARE: CPT

## 2025-07-01 PROCEDURE — 37000008 HC ANESTHESIA 1ST 15 MINUTES: Performed by: STUDENT IN AN ORGANIZED HEALTH CARE EDUCATION/TRAINING PROGRAM

## 2025-07-01 PROCEDURE — 94761 N-INVAS EAR/PLS OXIMETRY MLT: CPT

## 2025-07-01 PROCEDURE — 25000003 PHARM REV CODE 250: Performed by: NURSE PRACTITIONER

## 2025-07-01 PROCEDURE — 71000033 HC RECOVERY, INTIAL HOUR: Performed by: STUDENT IN AN ORGANIZED HEALTH CARE EDUCATION/TRAINING PROGRAM

## 2025-07-01 PROCEDURE — 97164 PT RE-EVAL EST PLAN CARE: CPT

## 2025-07-01 PROCEDURE — 27000221 HC OXYGEN, UP TO 24 HOURS

## 2025-07-01 PROCEDURE — 63600175 PHARM REV CODE 636 W HCPCS: Performed by: STUDENT IN AN ORGANIZED HEALTH CARE EDUCATION/TRAINING PROGRAM

## 2025-07-01 PROCEDURE — 37000009 HC ANESTHESIA EA ADD 15 MINS: Performed by: STUDENT IN AN ORGANIZED HEALTH CARE EDUCATION/TRAINING PROGRAM

## 2025-07-01 PROCEDURE — C1713 ANCHOR/SCREW BN/BN,TIS/BN: HCPCS | Performed by: STUDENT IN AN ORGANIZED HEALTH CARE EDUCATION/TRAINING PROGRAM

## 2025-07-01 PROCEDURE — 27201423 OPTIME MED/SURG SUP & DEVICES STERILE SUPPLY: Performed by: STUDENT IN AN ORGANIZED HEALTH CARE EDUCATION/TRAINING PROGRAM

## 2025-07-01 PROCEDURE — 25000003 PHARM REV CODE 250: Performed by: HOSPITALIST

## 2025-07-01 PROCEDURE — 0SSG04Z REPOSITION LEFT ANKLE JOINT WITH INTERNAL FIXATION DEVICE, OPEN APPROACH: ICD-10-PCS | Performed by: STUDENT IN AN ORGANIZED HEALTH CARE EDUCATION/TRAINING PROGRAM

## 2025-07-01 PROCEDURE — 64445 NJX AA&/STRD SCIATIC NRV IMG: CPT

## 2025-07-01 PROCEDURE — 99900035 HC TECH TIME PER 15 MIN (STAT)

## 2025-07-01 PROCEDURE — 25000003 PHARM REV CODE 250: Performed by: STUDENT IN AN ORGANIZED HEALTH CARE EDUCATION/TRAINING PROGRAM

## 2025-07-01 PROCEDURE — 36000708 HC OR TIME LEV III 1ST 15 MIN: Performed by: STUDENT IN AN ORGANIZED HEALTH CARE EDUCATION/TRAINING PROGRAM

## 2025-07-01 PROCEDURE — 64447 NJX AA&/STRD FEMORAL NRV IMG: CPT

## 2025-07-01 DEVICE — SCREW CORTEX 2.7 X 22: Type: IMPLANTABLE DEVICE | Site: ANKLE | Status: FUNCTIONAL

## 2025-07-01 DEVICE — IMPLANTABLE DEVICE: Type: IMPLANTABLE DEVICE | Site: ANKLE | Status: FUNCTIONAL

## 2025-07-01 DEVICE — SCREW 2.7X14MM: Type: IMPLANTABLE DEVICE | Site: ANKLE | Status: FUNCTIONAL

## 2025-07-01 DEVICE — SCREW STRDRV REC T8 2.7X12 SS: Type: IMPLANTABLE DEVICE | Site: ANKLE | Status: FUNCTIONAL

## 2025-07-01 DEVICE — SCREW 2.7X16MM: Type: IMPLANTABLE DEVICE | Site: ANKLE | Status: FUNCTIONAL

## 2025-07-01 DEVICE — SCREW CRTX LOW PROFILE H 70MM: Type: IMPLANTABLE DEVICE | Site: ANKLE | Status: FUNCTIONAL

## 2025-07-01 DEVICE — SCREW 2.7X18MM: Type: IMPLANTABLE DEVICE | Site: ANKLE | Status: FUNCTIONAL

## 2025-07-01 DEVICE — SCREW CRTX LOW PROFILE H 52MM: Type: IMPLANTABLE DEVICE | Site: ANKLE | Status: FUNCTIONAL

## 2025-07-01 RX ORDER — VANCOMYCIN HYDROCHLORIDE 1 G/20ML
INJECTION, POWDER, LYOPHILIZED, FOR SOLUTION INTRAVENOUS
Status: DISCONTINUED | OUTPATIENT
Start: 2025-07-01 | End: 2025-07-01 | Stop reason: HOSPADM

## 2025-07-01 RX ORDER — SENNOSIDES 8.6 MG/1
8.6 TABLET ORAL DAILY PRN
Status: DISCONTINUED | OUTPATIENT
Start: 2025-07-01 | End: 2025-07-03 | Stop reason: HOSPADM

## 2025-07-01 RX ORDER — PHENYLEPHRINE HYDROCHLORIDE 10 MG/ML
INJECTION INTRAVENOUS
Status: DISCONTINUED | OUTPATIENT
Start: 2025-07-01 | End: 2025-07-01

## 2025-07-01 RX ORDER — FENTANYL CITRATE 50 UG/ML
25 INJECTION, SOLUTION INTRAMUSCULAR; INTRAVENOUS EVERY 5 MIN PRN
Status: DISCONTINUED | OUTPATIENT
Start: 2025-07-01 | End: 2025-07-01 | Stop reason: HOSPADM

## 2025-07-01 RX ORDER — OXYCODONE HYDROCHLORIDE 5 MG/1
5 TABLET ORAL
Status: DISCONTINUED | OUTPATIENT
Start: 2025-07-01 | End: 2025-07-01 | Stop reason: HOSPADM

## 2025-07-01 RX ORDER — MUPIROCIN 20 MG/G
OINTMENT TOPICAL
Status: DISCONTINUED | OUTPATIENT
Start: 2025-07-01 | End: 2025-07-01 | Stop reason: HOSPADM

## 2025-07-01 RX ORDER — CHOLECALCIFEROL (VITAMIN D3) 25 MCG
1000 TABLET ORAL DAILY
Status: DISCONTINUED | OUTPATIENT
Start: 2025-07-01 | End: 2025-07-03 | Stop reason: HOSPADM

## 2025-07-01 RX ORDER — BACITRACIN ZINC 500 UNIT/G
OINTMENT (GRAM) TOPICAL
Status: DISCONTINUED | OUTPATIENT
Start: 2025-07-01 | End: 2025-07-01 | Stop reason: HOSPADM

## 2025-07-01 RX ORDER — DEXMEDETOMIDINE HYDROCHLORIDE 100 UG/ML
INJECTION, SOLUTION INTRAVENOUS
Status: DISCONTINUED | OUTPATIENT
Start: 2025-07-01 | End: 2025-07-01

## 2025-07-01 RX ORDER — DEXAMETHASONE SODIUM PHOSPHATE 4 MG/ML
INJECTION, SOLUTION INTRA-ARTICULAR; INTRALESIONAL; INTRAMUSCULAR; INTRAVENOUS; SOFT TISSUE
Status: DISCONTINUED | OUTPATIENT
Start: 2025-07-01 | End: 2025-07-01

## 2025-07-01 RX ORDER — CEFAZOLIN SODIUM 1 G/3ML
INJECTION, POWDER, FOR SOLUTION INTRAMUSCULAR; INTRAVENOUS
Status: DISCONTINUED | OUTPATIENT
Start: 2025-07-01 | End: 2025-07-01

## 2025-07-01 RX ORDER — FENTANYL CITRATE 50 UG/ML
INJECTION, SOLUTION INTRAMUSCULAR; INTRAVENOUS
Status: DISCONTINUED | OUTPATIENT
Start: 2025-07-01 | End: 2025-07-01

## 2025-07-01 RX ORDER — LIDOCAINE HYDROCHLORIDE 20 MG/ML
INJECTION, SOLUTION EPIDURAL; INFILTRATION; INTRACAUDAL; PERINEURAL
Status: DISCONTINUED | OUTPATIENT
Start: 2025-07-01 | End: 2025-07-01

## 2025-07-01 RX ORDER — BUPIVACAINE HYDROCHLORIDE 5 MG/ML
INJECTION, SOLUTION EPIDURAL; INTRACAUDAL; PERINEURAL
Status: COMPLETED | OUTPATIENT
Start: 2025-07-01 | End: 2025-07-01

## 2025-07-01 RX ORDER — PROPOFOL 10 MG/ML
VIAL (ML) INTRAVENOUS
Status: DISCONTINUED | OUTPATIENT
Start: 2025-07-01 | End: 2025-07-01

## 2025-07-01 RX ORDER — EPHEDRINE SULFATE 50 MG/ML
INJECTION, SOLUTION INTRAVENOUS
Status: DISCONTINUED | OUTPATIENT
Start: 2025-07-01 | End: 2025-07-01

## 2025-07-01 RX ORDER — MIDAZOLAM HYDROCHLORIDE 1 MG/ML
.5-4 INJECTION, SOLUTION INTRAMUSCULAR; INTRAVENOUS
Status: DISCONTINUED | OUTPATIENT
Start: 2025-07-01 | End: 2025-07-01 | Stop reason: HOSPADM

## 2025-07-01 RX ORDER — HALOPERIDOL LACTATE 5 MG/ML
0.5 INJECTION, SOLUTION INTRAMUSCULAR EVERY 10 MIN PRN
Status: DISCONTINUED | OUTPATIENT
Start: 2025-07-01 | End: 2025-07-01 | Stop reason: HOSPADM

## 2025-07-01 RX ORDER — ROCURONIUM BROMIDE 10 MG/ML
INJECTION, SOLUTION INTRAVENOUS
Status: DISCONTINUED | OUTPATIENT
Start: 2025-07-01 | End: 2025-07-01

## 2025-07-01 RX ORDER — ONDANSETRON HYDROCHLORIDE 2 MG/ML
INJECTION, SOLUTION INTRAVENOUS
Status: DISCONTINUED | OUTPATIENT
Start: 2025-07-01 | End: 2025-07-01

## 2025-07-01 RX ORDER — CEFAZOLIN 2 G/1
2 INJECTION, POWDER, FOR SOLUTION INTRAMUSCULAR; INTRAVENOUS
Status: COMPLETED | OUTPATIENT
Start: 2025-07-01 | End: 2025-07-02

## 2025-07-01 RX ORDER — FENTANYL CITRATE 50 UG/ML
25-200 INJECTION, SOLUTION INTRAMUSCULAR; INTRAVENOUS
Status: DISCONTINUED | OUTPATIENT
Start: 2025-07-01 | End: 2025-07-01 | Stop reason: HOSPADM

## 2025-07-01 RX ADMIN — LEVOTHYROXINE SODIUM 125 MCG: 0.12 TABLET ORAL at 05:07

## 2025-07-01 RX ADMIN — OXYCODONE HYDROCHLORIDE 10 MG: 10 TABLET ORAL at 12:07

## 2025-07-01 RX ADMIN — DEXMEDETOMIDINE 8 MCG: 100 INJECTION, SOLUTION, CONCENTRATE INTRAVENOUS at 09:07

## 2025-07-01 RX ADMIN — FERROUS SULFATE TAB EC 325 MG (65 MG FE EQUIVALENT) 1 EACH: 325 (65 FE) TABLET DELAYED RESPONSE at 12:07

## 2025-07-01 RX ADMIN — LIDOCAINE HYDROCHLORIDE 100 MG: 20 INJECTION, SOLUTION EPIDURAL; INFILTRATION; INTRACAUDAL at 07:07

## 2025-07-01 RX ADMIN — ACETAMINOPHEN 1000 MG: 500 TABLET ORAL at 05:07

## 2025-07-01 RX ADMIN — CEFAZOLIN 2 G: 2 INJECTION, POWDER, FOR SOLUTION INTRAMUSCULAR; INTRAVENOUS at 04:07

## 2025-07-01 RX ADMIN — FENTANYL CITRATE 50 MCG: 50 INJECTION INTRAMUSCULAR; INTRAVENOUS at 07:07

## 2025-07-01 RX ADMIN — FENTANYL CITRATE 25 MCG: 50 INJECTION INTRAMUSCULAR; INTRAVENOUS at 08:07

## 2025-07-01 RX ADMIN — ASPIRIN 81 MG: 81 TABLET, COATED ORAL at 08:07

## 2025-07-01 RX ADMIN — METHOCARBAMOL 500 MG: 500 TABLET ORAL at 10:07

## 2025-07-01 RX ADMIN — BUPIVACAINE HYDROCHLORIDE 20 ML: 5 INJECTION, SOLUTION EPIDURAL; INTRACAUDAL; PERINEURAL at 06:07

## 2025-07-01 RX ADMIN — ASPIRIN 81 MG: 81 TABLET, COATED ORAL at 10:07

## 2025-07-01 RX ADMIN — MUPIROCIN: 20 OINTMENT TOPICAL at 06:07

## 2025-07-01 RX ADMIN — ESCITALOPRAM OXALATE 10 MG: 10 TABLET ORAL at 10:07

## 2025-07-01 RX ADMIN — ONDANSETRON 4 MG: 2 INJECTION INTRAMUSCULAR; INTRAVENOUS at 09:07

## 2025-07-01 RX ADMIN — FERROUS SULFATE TAB EC 325 MG (65 MG FE EQUIVALENT) 1 EACH: 325 (65 FE) TABLET DELAYED RESPONSE at 10:07

## 2025-07-01 RX ADMIN — PREGABALIN 50 MG: 50 CAPSULE ORAL at 10:07

## 2025-07-01 RX ADMIN — FENTANYL CITRATE 50 MCG: 50 INJECTION INTRAMUSCULAR; INTRAVENOUS at 06:07

## 2025-07-01 RX ADMIN — SODIUM CHLORIDE: 9 INJECTION, SOLUTION INTRAVENOUS at 10:07

## 2025-07-01 RX ADMIN — BUPIVACAINE HYDROCHLORIDE 15 ML: 5 INJECTION, SOLUTION EPIDURAL; INTRACAUDAL; PERINEURAL at 06:07

## 2025-07-01 RX ADMIN — METHOCARBAMOL 500 MG: 500 TABLET ORAL at 08:07

## 2025-07-01 RX ADMIN — PREGABALIN 50 MG: 50 CAPSULE ORAL at 08:07

## 2025-07-01 RX ADMIN — PHENYLEPHRINE HYDROCHLORIDE 100 MCG: 10 INJECTION INTRAVENOUS at 07:07

## 2025-07-01 RX ADMIN — SUGAMMADEX 200 MG: 100 INJECTION, SOLUTION INTRAVENOUS at 09:07

## 2025-07-01 RX ADMIN — SODIUM CHLORIDE: 0.9 INJECTION, SOLUTION INTRAVENOUS at 08:07

## 2025-07-01 RX ADMIN — SODIUM CHLORIDE: 0.9 INJECTION, SOLUTION INTRAVENOUS at 06:07

## 2025-07-01 RX ADMIN — CEFAZOLIN 2 G: 330 INJECTION, POWDER, FOR SOLUTION INTRAMUSCULAR; INTRAVENOUS at 07:07

## 2025-07-01 RX ADMIN — ACETAMINOPHEN 1000 MG: 500 TABLET ORAL at 08:07

## 2025-07-01 RX ADMIN — ROCURONIUM BROMIDE 50 MG: 10 INJECTION, SOLUTION INTRAVENOUS at 07:07

## 2025-07-01 RX ADMIN — EPHEDRINE SULFATE 10 MG: 50 INJECTION INTRAVENOUS at 07:07

## 2025-07-01 RX ADMIN — ACETAMINOPHEN 1000 MG: 500 TABLET ORAL at 02:07

## 2025-07-01 RX ADMIN — CHOLECALCIFEROL TAB 25 MCG (1000 UNIT) 1000 UNITS: 25 TAB at 10:07

## 2025-07-01 RX ADMIN — DEXAMETHASONE SODIUM PHOSPHATE 4 MG: 4 INJECTION INTRA-ARTICULAR; INTRALESIONAL; INTRAMUSCULAR; INTRAVENOUS; SOFT TISSUE at 07:07

## 2025-07-01 RX ADMIN — FENTANYL CITRATE 25 MCG: 50 INJECTION INTRAMUSCULAR; INTRAVENOUS at 09:07

## 2025-07-01 RX ADMIN — ATORVASTATIN CALCIUM 20 MG: 20 TABLET, FILM COATED ORAL at 08:07

## 2025-07-01 RX ADMIN — METHOCARBAMOL 500 MG: 500 TABLET ORAL at 02:07

## 2025-07-01 RX ADMIN — PROPOFOL 150 MG: 10 INJECTION, EMULSION INTRAVENOUS at 07:07

## 2025-07-01 RX ADMIN — FERROUS SULFATE TAB EC 325 MG (65 MG FE EQUIVALENT) 1 EACH: 325 (65 FE) TABLET DELAYED RESPONSE at 08:07

## 2025-07-01 RX ADMIN — SODIUM CHLORIDE: 9 INJECTION, SOLUTION INTRAVENOUS at 12:07

## 2025-07-01 NOTE — ANESTHESIA PROCEDURE NOTES
L popliteal SS    Patient location during procedure: pre-op   Block not for primary anesthetic.  Reason for block: at surgeon's request and post-op pain management   Post-op Pain Location: L ankle pain   Start time: 7/1/2025 6:40 AM  Timeout: 7/1/2025 6:40 AM   End time: 7/1/2025 6:50 AM    Staffing  Authorizing Provider: Nicolas Reyes MD  Performing Provider: Blanca Crowder MD    Staffing  Performed by: Blanca Crowder MD  Authorized by: Nicolas Reyes MD    Preanesthetic Checklist  Completed: patient identified, IV checked, site marked, risks and benefits discussed, surgical consent, monitors and equipment checked, pre-op evaluation and timeout performed  Peripheral Block  Patient position: supine  Prep: ChloraPrep  Patient monitoring: heart rate, cardiac monitor, continuous pulse ox, continuous capnometry and frequent blood pressure checks  Block type: popliteal  Laterality: left  Injection technique: single shot  Needle  Needle type: Stimuplex   Needle gauge: 20 G  Needle length: 4 in  Needle localization: anatomical landmarks and ultrasound guidance   -ultrasound image captured on disc.  Assessment  Injection assessment: negative aspiration, negative parasthesia and local visualized surrounding nerve  Paresthesia pain: none  Heart rate change: no  Slow fractionated injection: yes    Medications:    Medications: bupivacaine (pf) (MARCAINE) injection 0.5% - Perineural   20 mL - 7/1/2025 6:45:00 AM    Additional Notes  VSS.  [facility] RN monitoring vitals throughout procedure.  Patient tolerated procedure well.

## 2025-07-01 NOTE — PT/OT/SLP RE-EVAL
Physical Therapy [id number]  Co-eval performed due to anticipated need for 2 skilled therapists to appropriately and safely assess patient's strength and endurance to facilitate functional and safe mobility in addition to accommodating for patient's activity tolerance.    Patient Name:  Caity Ashby   MRN:  [id number]    Recommendations:     Discharge Recommendations: High Intensity Therapy  Discharge Equipment Recommendations: walker, rolling, shower chair, bedside commode, wheelchair   Barriers to discharge: Inaccessible home and Decreased caregiver support    Assessment:     Caity Ashby is a 85 y.o. female admitted with a medical diagnosis of Closed displaced trimalleolar fracture of left ankle.  She presents with the following impairments/functional limitations: weakness, impaired endurance, impaired self care skills, impaired functional mobility, gait instability, impaired balance, decreased lower extremity function, decreased safety awareness, pain, orthopedic precautions, decreased ROM. Pt would benefit from high intensity/frequency therapy for: Dynamic/static standing/sitting balance through skilled balance training, strengthening with the use of skilled therapeutic exercises interventions, mobility and safety training to ensure safe discharge home through skilled patient and caregiver education, and mobility through adaptive equipment training. Pt highly motivated to return to independent PLOF and can tolerate 3+hours of therapy. Pt continues to benefit from a collaborative multidisciplinary program to improve quality of life and focus on recovery of impairments.    Rehab Prognosis:  Good; patient would benefit from acute skilled PT services to address these deficits and reach maximum level of function.      Recent Surgery: Procedure(s) (LRB):  ORIF, ANKLE, LEFT (Left)  FIXATION, SYNDESMOSIS, ANKLE (Left) * Day of Surgery *    Plan:     During this hospitalization, patient to be seen 4  x/week to address the above listed problems via therapeutic activities, therapeutic exercises, gait training, neuromuscular re-education  Plan of Care Expires:  07/31/25  Plan of Care Reviewed with: patient, spouse    Subjective     Communicated with RN prior to session.  Patient found HOB elevated with cryotherapy, FCD, oxygen, PureWick, peripheral IV, wound vac upon PT entry to room, agreeable to evaluation.      Chief Complaint: none verbalized  Patient comments/goals: return to independent PLOF  Pain/Comfort:  Pain Rating 1: 0/10  Pain Rating Post-Intervention 1: 0/10    Patients cultural, spiritual, Gnosticist conflicts given the current situation: no      Objective:     Patient found with: cryotherapy, FCD, oxygen, PureWick, peripheral IV, wound vac     General Precautions: Standard, fall  Orthopedic Precautions: LLE toe touch weight bearing  Braces:  (LLE cast)  Respiratory Status: Nasal cannula, flow 2 L/min    Exams:  Cognitive Exam:  Patient is oriented to Person, Place, Time, and Situation  Gross Motor Coordination:  WFL  Postural Exam:  Patient presented with the following abnormalities:    -       Rounded shoulders  -       Forward head  Sensation: L toes are numb, otherwise LEs are intact  RLE ROM: WFL  RLE Strength: hip flexion 4/5, knee extension 4/5, knee flexion 4/5, DF 5/5  LLE ROM: WFL, except ankle CRYSTAL  LLE Strength: hip flexion 4/5, knee extension 4/5 (observed), knee flexion 3/5 (observed), DF CRYSTAL    Functional Mobility:  Bed Mobility:     Scooting: stand by assistance  Supine to Sit: stand by assistance  Sit to Supine: stand by assistance  Transfers:     Sit to Stand: pt politely declining a stand this date 2/2 surgery this AM  Balance: SBA for sitting EOB    AM-PAC 6 CLICK MOBILITY  Total Score:17       Treatment and Education:  Pt  and spouse educated on TTWBing status.   Patient and spouse educated on role of therapy, goals of session, and benefits of mobilizing.   Discussed PT plan of  care during hospitalization.   Patient and spouse educated on calling for assistance.   Patient educated on how their diagnosis impacts their mobility within PT scope of practice.   Communication board up to date.  All questions answered within PT scope of practice.    Patient left HOB elevated with all lines intact, call button in reach, and pt's spouse present.    GOALS:   Multidisciplinary Problems       Physical Therapy Goals          Problem: Physical Therapy    Goal Priority Disciplines Outcome Interventions   Physical Therapy Goal     PT, PT/OT Progressing    Description: Goals to be met by: 7/15/2025     Patient will increase functional independence with mobility by performin. Supine to sit with [place]  2. Sit to supine with [place]  3. Rolling to Left and Right with [place].  4. Bed to chair transfer with Supervision using Rolling Walker  5. Gait  x 100 feet with Supervision using Rolling Walker.   6. Ascend/Descend 6 inch curb step with Contact Guard Assistance using Rolling Walker.  7. Lower extremity exercise program x10 reps per handout, with supervision                         DME Justifications:   Caity requires a commode for home use because she is confined to a single room.   Caity's mobility limitation cannot be sufficiently resolved by the use of a cane. Her functional mobility deficit can be sufficiently resolved with the use of a Rolling Walker. Patient's mobility limitation significantly impairs their ability to participate in one of more activities of daily living.  The use of a RW will significantly improve the patient's ability to participate in MRADLS and the patient will use it on regular basis in the home.    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

## 2025-07-01 NOTE — PLAN OF CARE
OT re-eval complete. Goals updated.   Problem: Occupational Therapy  Goal: Occupational Therapy Goal  Description: Goals to be met by: 7/6/2025     Patient will increase functional independence with ADLs by performing:    UE Dressing with Modified [place].  LE Dressing with Stand-by Assistance using appropriate AE as needed.  Grooming while standing at sink with Stand-by Assistance.  Toileting from toilet/bedside commode over toilet with Stand-by Assistance for hygiene and clothing management.   Supine to sit with Modified [place].  Step transfer with Stand-by Assistance with RW.  Toilet transfer to toilet/bedside commode with Stand-by Assistance with RW.    Outcome: Progressing

## 2025-07-01 NOTE — PROGRESS NOTES
[facility]  Progress Note    Patient Name: Caity Ashby  MRN: [id number]  Patient Class: IP- Inpatient   Admission Date: 6/21/2025  Length of Stay: 7 days  Attending Physician: Carmita Zuniga MD  Primary Care Provider: Erasmo Jones MD        Subjective     Principal Problem:Closed displaced trimalleolar fracture of left ankle        HPI:  Caity Ashby is a 85 y.o. female with a PMHx of HTN, HLD, arthritis, anxiety/depression, hypothyroidism, and migraines who presented to the ED for obvious LLE deformity after a fall. Patient states that she was walking down her stairs when she accidentally missed the bottom step causing her foot to go under her as she fell to the ground. Patient denies LOC or head trauma. She had immediate severe pain and a noticeable deformity to her left ankle. Denies numbness/tingling in her LLE. Denies sustaining any additional injuries. She reports prior to the fall she was in her normal state of health and denies CP, SOB, cough, fever/chills, abdominal pain, N/V/D, or urinary complaints.    In the ED, hypertensive initially otherwise VSS, afebrile. CBC unremarkable. Na 135. K 3.1. Bicarb 20. Anion gap 17. Cr 1.3 (bl~1.1). Calcium 10.6. Xray L foot with fracture involving the proximal aspect of the 2nd metatarsal and possibly 3rd. No significant overlying edema, correlation with any focal tenderness is recommended as this could reflect irregular healing of prior fracture. X-ray L ankle revealed an avulsion fracture involving the distal aspect of the fibula noting displacement of the distal fracture fragment. There is posterior dislocation of the tibiotalar articulation.There is questionable fracture of the medial malleolus though not confirmed. Post reduction x-ray ankle revealing interval placement of overlying cast material, which limits fine bony and soft tissue detail. Improved alignment of ankle fracture and improved alignment of the  "ankle joint. Suspected trimalleolar ankle fracture now demonstrates anatomic alignment. CT L ankle pending. Orthopedics consulted in the ED and performed reduction of left ankle and short leg splint applied. Recommend DVT ppx with ASA bid and plan for operative fixation in the coming weeks.The patient received 2g Ancef, 4mg Zofran, 50mcg Fentanyl x2 doses.     Overview/Hospital Course:  Patient was walking down her stairs when she accidentally missed the bottom step causing her left foot to go under her as she fell to the ground. Patient unable to bear weight to left foot and ankle so EMS called and patient brought to ED. Imaging in ED showed "Trimalleolar ankle fracture with near anatomic alignment. Remote or subacute nondisplaced fracture of the 2nd metatarsal." Orthopedics consulted in ED and left ankle reduced and splinted in ED. Patient placed non weight bearing to left lower extremity. Left leg elevated and iced with plans for operative fixation of left ankle fracture once soft tissue swelling improves. PT/OT consulted while patient in hospital and recommending high intensity on discharge when medically ready and plan discharge to [facility] when medically ready after her ankle fracture surgery. Patient placed on multimodals for pain management. Patient placed on Aspirin 81 mg po BID for DVT prophylaxis. Pain controlled to left ankle. Ortho re-evaluated patient and state soft tissue amenable to repair and patient taken to the OR on 7/1/2025 and underwent ORIF of left trimalleolar ankle fracture without fixation of the posterior malleolus and open fixation of syndesmosis by Dr. Yi Walter. Post-op, patient is touch down weight bearing for 6 weeks to left lower extremity per Ortho. Patient continued on Aspirin 81 mg po BID for DVT prophylaxis and will need for 6 weeks. Patient to return to clinic 7 days after discharge from the hospital for removal of the Prevena Restore wound vac, wound check and " conversion to a dry dressing with a CAM boot. Patient will also need an appointment June 24 for suture removal and X-rays of left ankle. PT/OT re consulted post-op to work with patient. Patient continued on multimodals for pain management.     Interval History: Patient taken to the OR this am and back in her room and  at bedside. Patient underwent ORIF of left trimalleolar ankle fracture without fixation of the posterior malleolus and open fixation of syndesmosis by Dr. Yi Walter. Post-op, patient is touch down weight bearing for 6 weeks to left lower extremity per Ortho. Patient continued on Aspirin 81 mg po BID for DVT prophylaxis and will need for 6 weeks. Patient to return to clinic 7 days after discharge from the hospital for removal of the Prevena Restore wound vac, wound check and conversion to a dry dressing with a CAM boot. Patient will also need an appointment June 24 for suture removal and X-rays of left ankle. PT/OT re consulted post-op to work with patient. Patient continued on multimodals for pain management. Patient got single shot nerve block today so at present denies any left ankle pain.  concerned as he states her BP tends to drop after surgery and I explained nursing will be checking vital signs every 4 hours and monitoring her BP which is standard protocol after surgery. Plan when medically ready is to discharge to [facility].     Review of Systems   Constitutional:  Negative for fever.   Respiratory:  Negative for cough and shortness of breath.    Cardiovascular:  Negative for chest pain.   Gastrointestinal:  Negative for abdominal pain and nausea.   Musculoskeletal:  Negative for arthralgias (Left ankle).   Psychiatric/Behavioral:  Negative for agitation and confusion.      Objective:     Vital Signs (Most Recent):  Temp: 97.5 °F (36.4 °C) (07/01/25 1056)  Pulse: 77 (07/01/25 1056)  Resp: 20 (07/01/25 1056)  BP: 121/60 (07/01/25 1056)  SpO2: 100 % (07/01/25 1056) on room  air Vital Signs (24h Range):  Temp:  [97.5 °F (36.4 °C)-98.4 °F (36.9 °C)] 97.5 °F (36.4 °C)  Pulse:  [67-90] 77  Resp:  [14-22] 20  SpO2:  [89 %-100 %] 100 %  BP: (121-185)/(59-85) 121/60     Weight: 60.8 kg (133 lb 15.9 oz)  Body mass index is 21.63 kg/m².    Intake/Output Summary (Last 24 hours) at 7/1/2025 1313  Last data filed at 7/1/2025 0857  Gross per 24 hour   Intake 1000 ml   Output 1200 ml   Net -200 ml         Physical Exam  Vitals and nursing note reviewed.   Constitutional:       General: She is awake. She is not in acute distress.     Appearance: Normal appearance. She is well-developed and normal weight. She is not ill-appearing.      Comments: Patient laying in bed and left leg elevated and  at bedside. Patient awake and alert and oriented x 4.    Cardiovascular:      Rate and Rhythm: Normal rate and regular rhythm.      Heart sounds: Normal heart sounds. No murmur heard.  Pulmonary:      Effort: Pulmonary effort is normal. No respiratory distress.      Breath sounds: Normal breath sounds. No wheezing.   Abdominal:      General: Abdomen is flat. Bowel sounds are normal. There is no distension.      Palpations: Abdomen is soft.      Tenderness: There is no abdominal tenderness.   Musculoskeletal:      Left lower leg: No edema.      Comments: Left ankle in hard splint and wound vac in place.   Skin:     General: Skin is warm.      Findings: No erythema.   Neurological:      Mental Status: She is alert and oriented to person, place, and time.   Psychiatric:         Mood and Affect: Mood normal.         Behavior: Behavior normal. Behavior is cooperative.         Thought Content: Thought content normal.         Judgment: Judgment normal.               Significant Labs: All pertinent labs within the past 24 hours have been reviewed.    Significant Imaging: I have reviewed all pertinent imaging results/findings within the past 24 hours.      Assessment & Plan  Closed displaced trimalleolar fracture  "of left ankle s/p ORIF on 7/1/2025  Pathological fracture of left ankle due to osteoporosis with routine healing  Patient was walking down her stairs when she accidentally missed the bottom step causing her left foot to go under her as she fell to the ground. Patient unable to bear weight to left foot and ankle so EMS called and patient brought to ED. Imaging in ED showed "Trimalleolar ankle fracture with near anatomic alignment. Remote or subacute nondisplaced fracture of the 2nd metatarsal." Fracture related to osteoporosis.   - Orthopedics consulted in ED and left ankle reduced and splinted in ED.   - Left leg elevated and iced with plans for operative fixation of left ankle fracture once swelling improves.   - PT/OT re consulted post-op and plan discharge to [facility] when medically ready after her ankle fracture surgery.   - Continue on multimodals for pain management with Tylenol 1000 mg po every 8 hours Robaxin 500 mg po TID as pain controlled to left ankle. Pain controlled.   - Continue on Aspirin 81 mg po BID for DVT prophylaxis and will need for 6 weeks post-op.   - Soft tissue finally amenable to surgery on 7/1. Patient taken to the OR on 7/1/2025 and underwent ORIF of left trimalleolar ankle fracture without fixation of the posterior malleolus and open fixation of syndesmosis by Dr. Yi Walter.   - Post-op, patient is touch down weight bearing for 6 weeks to left lower extremity per Ortho.   - Prevena wound vac in place to incision. Patient to return to Orthopedic clinic 7 days after discharge from the hospital for removal of the Prevena Restore wound vac, wound check and conversion to a dry dressing with a CAM boot. Patient will also need an appointment June 24 for suture removal and X-rays of left ankle.   - Patient got single shot nerve block today so at present denies any left ankle pain on 7/1.  - Fall Precautions.    Closed displaced fracture of second metatarsal bone of left " foot  CT imaging on admit showed remote or subacute nondisplaced fracture of the 2nd metatarsal of left foot. Orthopedics evaluated and no treatment or intervention needed.     Primary hypertension  Patients blood pressure range in the last 24 hours was: BP  Min: 90/49  Max: 190/81.The patient's inpatient anti-hypertensive regimen is listed below:  Current Antihypertensives   None    Plan  - BP is controlled, no changes needed to their regimen. Home HCTZ and Losartan on hold as patient having lower BPs in hospital and held for surgery this am and plan to resume post-op as BP tolerates.   - Goal BP < 140/90.     Stage 3a chronic kidney disease  Present on admit. No previous diagnosis in chart but GFR appears to have been 40-50 since 2020. Creatine stable for now. BMP reviewed- noted Estimated Creatinine Clearance: 49.9 mL/min (based on SCr of 0.8 mg/dL). according to latest data. Based on current GFR, CKD stage is stage 3a. Monitor UOP and serial BMP and adjust therapy as needed. Renally dose meds. Avoid nephrotoxic medications and procedures.  Acquired hypothyroidism  Patient has chronic hypothyroidism. TFTs reviewed-   Lab Results   Component Value Date    TSH 2.778 01/21/2023     Will continue chronic Levothyroxine to treat and adjust for and clinical changes.  Primary osteoarthritis of left knee  Chronic condition and controlled. Patient on Celebrex and PRN Voltaren gel and Skelaxin at home to treat and holding in hospital for now.     Anxiety and depression  Patient has recurrent depression which is moderate and is currently controlled. Will Continue anti-depressant medications with Lexapro to treat his depression and anxiety. We will not consult psychiatry at this time. Patient does not display psychosis at this time. Continue to monitor closely and adjust plan of care as needed.    Pure hypercholesterolemia  Chronic and controlled. Continue Lipitor to treat. Monitor clinically. Last LDL was   Lab Results    Component Value Date    LDLCALC 94 12/16/2020      Iron deficiency anemia secondary to inadequate dietary iron intake  Controlled. Anemia is likely due to iron deficiency, suspect poor intake per  as up to date on C scope/ pap as not longer requires as previous normal and age. No bleeding reprted nor dark stools.     Plan  - Transfuse PRBC if patient becomes hemodynamically unstable, symptomatic or H/H drops below 7/21.  - Patient has not received any PRBC transfusions to date.  - Patient's anemia is currently stable.  - Continue oral iron replacement to treat.   Chronic headaches  Chronic and controlled. Continue home Imitrex PRN to treat.     Other reduced mobility  Related to ankle fracture. PT/OT consulted and working with patient. Progressive mobility protocol initiated. Fall precautions.     VTE Risk Mitigation (From admission, onward)           Ordered     Place sequential compression device  Until discontinued         06/29/25 1246     IP VTE HIGH RISK PATIENT  Once         06/29/25 1246     Place sequential compression device  Until discontinued         06/21/25 2305                    Discharge Planning   MANINDER: 7/3/2025     Code Status: Full Code   Discharge Plan A: Rehab      Carmita Zuniga MD  Department of Hospital Medicine   [facility]

## 2025-07-01 NOTE — SUBJECTIVE & OBJECTIVE
Interval History: Patient taken to the OR this am and back in her room and  at bedside. Patient underwent ORIF of left trimalleolar ankle fracture without fixation of the posterior malleolus and open fixation of syndesmosis by Dr. Yi Walter. Post-op, patient is touch down weight bearing for 6 weeks to left lower extremity per Ortho. Patient continued on Aspirin 81 mg po BID for DVT prophylaxis and will need for 6 weeks. Patient to return to clinic 7 days after discharge from the hospital for removal of the Prevena Restore wound vac, wound check and conversion to a dry dressing with a CAM boot. Patient will also need an appointment June 24 for suture removal and X-rays of left ankle. PT/OT re consulted post-op to work with patient. Patient continued on multimodals for pain management. Patient got single shot nerve block today so at present denies any left ankle pain.  concerned as he states her BP tends to drop after surgery and I explained nursing will be checking vital signs every 4 hours and monitoring her BP which is standard protocol after surgery. Plan when medically ready is to discharge to [facility].     Review of Systems   Constitutional:  Negative for fever.   Respiratory:  Negative for cough and shortness of breath.    Cardiovascular:  Negative for chest pain.   Gastrointestinal:  Negative for abdominal pain and nausea.   Musculoskeletal:  Negative for arthralgias (Left ankle).   Psychiatric/Behavioral:  Negative for agitation and confusion.      Objective:     Vital Signs (Most Recent):  Temp: 97.5 °F (36.4 °C) (07/01/25 1056)  Pulse: 77 (07/01/25 1056)  Resp: 20 (07/01/25 1056)  BP: 121/60 (07/01/25 1056)  SpO2: 100 % (07/01/25 1056) on room air Vital Signs (24h Range):  Temp:  [97.5 °F (36.4 °C)-98.4 °F (36.9 °C)] 97.5 °F (36.4 °C)  Pulse:  [67-90] 77  Resp:  [14-22] 20  SpO2:  [89 %-100 %] 100 %  BP: (121-185)/(59-85) 121/60     Weight: 60.8 kg (133 lb 15.9 oz)  Body mass index  is 21.63 kg/m².    Intake/Output Summary (Last 24 hours) at 7/1/2025 1313  Last data filed at 7/1/2025 0857  Gross per 24 hour   Intake 1000 ml   Output 1200 ml   Net -200 ml         Physical Exam  Vitals and nursing note reviewed.   Constitutional:       General: She is awake. She is not in acute distress.     Appearance: Normal appearance. She is well-developed and normal weight. She is not ill-appearing.      Comments: Patient laying in bed and left leg elevated and  at bedside. Patient awake and alert and oriented x 4.    Cardiovascular:      Rate and Rhythm: Normal rate and regular rhythm.      Heart sounds: Normal heart sounds. No murmur heard.  Pulmonary:      Effort: Pulmonary effort is normal. No respiratory distress.      Breath sounds: Normal breath sounds. No wheezing.   Abdominal:      General: Abdomen is flat. Bowel sounds are normal. There is no distension.      Palpations: Abdomen is soft.      Tenderness: There is no abdominal tenderness.   Musculoskeletal:      Left lower leg: No edema.      Comments: Left ankle in hard splint and wound vac in place.   Skin:     General: Skin is warm.      Findings: No erythema.   Neurological:      Mental Status: She is alert and oriented to person, place, and time.   Psychiatric:         Mood and Affect: Mood normal.         Behavior: Behavior normal. Behavior is cooperative.         Thought Content: Thought content normal.         Judgment: Judgment normal.               Significant Labs: All pertinent labs within the past 24 hours have been reviewed.    Significant Imaging: I have reviewed all pertinent imaging results/findings within the past 24 hours.

## 2025-07-01 NOTE — ASSESSMENT & PLAN NOTE
Controlled. Anemia is likely due to iron deficiency, suspect poor intake per  as up to date on C scope/ pap as not longer requires as previous normal and age. No bleeding reprted nor dark stools.     Plan  - Transfuse PRBC if patient becomes hemodynamically unstable, symptomatic or H/H drops below 7/21.  - Patient has not received any PRBC transfusions to date.  - Patient's anemia is currently stable.  - Continue oral iron replacement to treat.

## 2025-07-01 NOTE — ANESTHESIA PROCEDURE NOTES
Physical Therapy Treatment Note     Name: Sukumar Beal  [facility] Number: [id number]    Therapy Diagnosis:   Encounter Diagnoses   Name Primary?    Decreased range of motion of right shoulder Yes    Decreased muscle strength      Physician: Jh Sibley*    Visit Date: 12/15/2022    Physician Orders: PT Eval and Treat   Medical Diagnosis from Referral:   Diagnosis   M75.101 (ICD-10-CM) - Nontraumatic tear of right rotator cuff, unspecified tear extent   M75.21 (ICD-10-CM) - Biceps tendinitis of right upper extremity      Evaluation Date: 11/1/2022  Authorization Period Expiration: 12/31/2022  Plan of Care Expiration: 1/24/2022  Visit # / Visits authorized: 11/ 20    Initial FOTO: 58% (Predicted 27%)  FOTO 1st F/U:   FOTO 2nd F/U:       Time In: 1004am  Time Out: 1058am  Total Billable Time: 54 minutes    Precautions: Standard and RCR     Date of Surgery: 10/14/2022  Weeks Post Op: 8 weeks, 6 days     From Surgeon:  Physical Therapy: Follow the >3 cm cuff repair rehab protocol. PROM starts after 2 weeks. AROM starts after 7 weeks. No cuff resistive activity x 12 weeks. No biceps resistive activity x 8 weeks. We will be just a bit slower in this case given the tear pattern and size in this case    A/P:  Arthroscopic pictures again reviewed with the patient.  May wean out of the sling.  Discussed ongoing lifting precautions.  No more than a Coke can or small book.  May begin the active phase of his range of motion.  Work on active and passive range of motion and continued scapular mechanics.  I will see him back in 6 weeks.  At that time we will begin the strengthening phase of recovery.  He understands that he will have some activity and lifting restrictions up to 5 months postop.  All questions answered.  Prescription for Naprosyn given.    Subjective     Pt reports: I am getting more comfortable out of the sling, but still sleeping on the couch because I've gotten so used to it. I am doing exercise  L saphenous SS    Patient location during procedure: pre-op   Block not for primary anesthetic.  Reason for block: at surgeon's request and post-op pain management   Post-op Pain Location: L ankle pain   Start time: 7/1/2025 6:45 AM  Timeout: 7/1/2025 6:45 AM   End time: 7/1/2025 6:50 AM    Staffing  Authorizing Provider: Nicolas Reyes MD  Performing Provider: Blanca Crowder MD    Staffing  Performed by: Blanca Crowder MD  Authorized by: Nicolas Reyes MD    Preanesthetic Checklist  Completed: patient identified, IV checked, site marked, risks and benefits discussed, surgical consent, monitors and equipment checked, pre-op evaluation and timeout performed  Peripheral Block  Patient position: supine  Prep: ChloraPrep  Patient monitoring: heart rate, cardiac monitor, continuous pulse ox, continuous capnometry and frequent blood pressure checks  Block type: saphenous  Laterality: left  Injection technique: single shot  Needle  Needle type: Stimuplex   Needle gauge: 20 G  Needle length: 4 in  Needle localization: anatomical landmarks and ultrasound guidance   -ultrasound image captured on disc.  Assessment  Injection assessment: negative aspiration, negative parasthesia and local visualized surrounding nerve  Paresthesia pain: none  Heart rate change: no  Slow fractionated injection: yes    Medications:    Medications: bupivacaine (pf) (MARCAINE) injection 0.5% - Perineural   15 mL - 7/1/2025 6:50:00 AM    Additional Notes  VSS.  [facility] RN monitoring vitals throughout procedure.  Patient tolerated procedure well.             classes but abbreviated to lunges, squats and a lot of walking  He was compliant with home exercise program.  Response to previous treatment: soreness  Functional change: improved overhead reach    Pain: not formally assessed  Location: right shoulder      Objective   Passive 0-150, ER to 35 deg  Active 135 deg flexion    Sukumar received therapeutic exercises to develop strength, endurance, ROM, flexibility, and posture for 25 minutes including:    Pulley 4' flex/scaption   Standing scaption mirror cues 3 x 12  Sidelying ER to neutral 3 x 10  Standing slot machine 3# 2 x 15    Sukumar received the following manual therapy techniques: Joint mobilizations and Soft tissue Mobilization were applied to the: right shoulder for 15 minutes, including:    Passive Range of Motion into flexion and External Rotation Gr III AP mob on R GH  Gr I-II oscillations relaxation  Inferior mob arm at 20 deg abd Gr III    Sukumar participated in neuromuscular re-education activities to improve: Coordination, Proprioception, and Posture for 14 minutes. The following activities were included:    Prone row 3 x 8 short lever  Prone extension 1# 2 x 8  No money GTB 3x10    Sukumar participated in dynamic functional therapeutic activities to improve functional performance for 0  minutes, including:      Home Exercises Provided and Patient Education Provided     Education provided:   - Home Exercise Program, precautions    Written Home Exercises Provided: Patient instructed to cont prior HEP.  Exercises were reviewed and Sukumar was able to demonstrate them prior to the end of the session.  Sukumar demonstrated good  understanding of the education provided.     See EMR under Patient Instructions for exercises provided prior visit.    Assessment     Patient progressed prone scap strengthening and AAROM with increased resistance. He was fatigued end of session but pain free with all exercises. Educated on safety in workout classes going forward     Sukumar Is  progressing well towards his goals.   Pt prognosis is Good.     Pt will continue to benefit from skilled outpatient physical therapy to address the deficits listed in the problem list box on initial evaluation, provide pt/family education and to maximize pt's level of independence in the home and community environment.     Pt's spiritual, cultural and educational needs considered and pt agreeable to plan of care and goals.     Anticipated barriers to physical therapy: none    Goals:   Short Term Goals:  6 weeks  Patient will be independent in Home Exercise Program for independent progression within protocol.-Progressing, not met   Patient will show improved Passive Range of Motion to 120 degrees of flexion to show progress within protocol.-Progressing, not met   Patient will show improved Passive Range of Motion to 30 degrees of External Rotation to show progress within protocol.-Progressing, not met      Long Term Goals: 12 weeks  Patient will be independent in Home Exercise Program to initiate strengthening and return to hobbies.-Progressing, not met   Patient will show improved full Passive Range of Motion in flexion and External Rotation. -Progressing, not met   Patient will show improved strength by demonstrating active flexion to 120 degrees.-Progressing, not met   Patient goal: return to boot camp three times a week with no restrictions.-Progressing, not met   Patient will have improved limitation score to 27% limited on FOTO shoulder in order to demonstrate functional improvement.-Progressing, not met     Plan     Continue range of motion activities per protocol    Reji Szymanski, PT  , DPT

## 2025-07-01 NOTE — NURSING TRANSFER
Nursing Transfer Note      7/1/2025   10:28 AM    Nurse giving handoff:calderon starr   Nurse receiving handoff:phil starr    Reason patient is being transferred: post procedure    Transfer To: 541     Transfer via bed    Transfer with  to O2    Transported by [employer]    Medicines sent: na    Any special needs or follow-up needed: na    Patient belongings transferred with patient: No    Chart send with patient: Yes    Notified: spouse    Patient reassessed at: 7/1/25 @1000

## 2025-07-01 NOTE — PROGRESS NOTES
[facility] - Surgery ([facility])  Orthopedics  Progress Note    Patient Name: Caity Ashby  MRN: [id number]  Admission Date: 6/21/2025  Hospital Length of Stay: 7 days  Attending Provider: Carmita Zuniga MD  Primary Care Provider: Erasmo Jones MD  Follow-up For: Procedure(s) (LRB):  ORIF, ANKLE, LEFT, Diving board, Bone foam, Synthes, C-arm clock side (Left)    Post-Operative Day: * Day of Surgery *  Subjective:     Principal Problem:Closed displaced trimalleolar fracture of left ankle    Principal Orthopedic Problem:  As above    Interval History:  Afebrile, hemodynamically stable.  Pain is well-controlled.  Understands the plan for ORIF left ankle today    NPO since midnight marked booked and consented for surgery today.    Review of patient's allergies indicates:   Allergen Reactions    Boniva [ibandronate] Other (See Comments)     SHOULDER PAIN    Demerol [meperidine]     Ramipril Swelling       Current Facility-Administered Medications   Medication    0.9% NaCl infusion    acetaminophen tablet 1,000 mg    aspirin EC tablet 81 mg    atorvastatin tablet 20 mg    dextrose 50% injection 12.5 g    dextrose 50% injection 25 g    EScitalopram oxalate tablet 10 mg    fentaNYL 50 mcg/mL injection  mcg    ferrous sulfate tablet 1 each    glucagon (human recombinant) injection 1 mg    glucose chewable tablet 16 g    glucose chewable tablet 24 g    levothyroxine tablet 125 mcg    melatonin tablet 6 mg    methocarbamoL tablet 500 mg    midazolam injection 0.5-4 mg    morphine injection 2 mg    mupirocin 2 % ointment    naloxone 0.4 mg/mL injection 0.02 mg    ondansetron injection 4 mg    oxyCODONE immediate release tablet 5 mg    oxyCODONE immediate release tablet Tab 10 mg    pregabalin capsule 50 mg    sodium chloride 0.9% flush 10 mL    sumatriptan tablet 100 mg     Objective:     Vital Signs (Most Recent):  Temp: 97.7 °F (36.5 °C) (07/01/25 0521)  Pulse: 69 (07/01/25 0606)  Resp: 14 (07/01/25  "0605)  BP: (!) 185/85 (07/01/25 0605)  SpO2: 96 % (07/01/25 0605) Vital Signs (24h Range):  Temp:  [97.5 °F (36.4 °C)-98.3 °F (36.8 °C)] 97.7 °F (36.5 °C)  Pulse:  [61-78] 69  Resp:  [14-18] 14  SpO2:  [92 %-97 %] 96 %  BP: (126-185)/(59-85) 185/85     Weight: 60.8 kg (133 lb 15.9 oz)  Height: 5' 6" (167.6 cm)  Body mass index is 21.63 kg/m².      Intake/Output Summary (Last 24 hours) at 7/1/2025 0613  Last data filed at 7/1/2025 0522  Gross per 24 hour   Intake 0 ml   Output 600 ml   Net -600 ml        Ortho/SPM Exam  Gen: NAD, sitting comfortably in bed  CV: regular rate  Resp: non-labored respirations    LLE:   Short-leg splint in place.  Splint iced and elevated.    Splint windowed, there is a serous blister over the anterior aspect of the distal tibia.  The skin of the anterior and lateral aspect of the distal leg wrinkle   Swelling significantly improved  Sensation intact to light touch throughout.    Wiggles all toes.  Brisk capillary refill to all toes     Significant Labs: All pertinent labs within the past 24 hours have been reviewed.    Significant Imaging: I have reviewed all pertinent imaging results/findings.  Assessment/Plan:     * Closed displaced trimalleolar fracture of left ankle  Caity Ashby is a 85 y.o. female with a left trimalleolar ankle fracture.  Presented closed, neurovascularly intact, and without any other musculoskeletal injuries on physical exam.  Closed reduction was performed in the emergency department.  The patient was admitted given concern for her to safely remain nonweightbearing at home.    She has been mobilizing with therapy and keeping the leg iced and elevated while at rest.  She continues to have a serous fracture blister over the anterior aspect of the distal leg.  She has moderate swelling throughout; however, her skin wrinkles, is mobile, in his amenable to surgery.  Plan for ORIF left ankle tomorrow, 07/01/2025.     Plan:   NWB to the LLE  DVT prophylaxis: " ASA 81mg bid  Aggressively ice and elevate the LLE on a double stack of sheets at all times when in bed or in the chair.  Multimodal pain control, limiting narcotics.  NPO midnight    Dispo: Skin amenable to surgery. Plan for surgery today 07/01          Ramón Crews MD  Orthopedics  [facility] - Surgery (2nd Fl)

## 2025-07-01 NOTE — ASSESSMENT & PLAN NOTE
"Patient was walking down her stairs when she accidentally missed the bottom step causing her left foot to go under her as she fell to the ground. Patient unable to bear weight to left foot and ankle so EMS called and patient brought to ED. Imaging in ED showed "Trimalleolar ankle fracture with near anatomic alignment. Remote or subacute nondisplaced fracture of the 2nd metatarsal." Fracture related to osteoporosis.   - Orthopedics consulted in ED and left ankle reduced and splinted in ED.   - Left leg elevated and iced with plans for operative fixation of left ankle fracture once swelling improves.   - PT/OT re consulted post-op and plan discharge to [facility] when medically ready after her ankle fracture surgery.   - Continue on multimodals for pain management with Tylenol 1000 mg po every 8 hours Robaxin 500 mg po TID as pain controlled to left ankle. Pain controlled.   - Continue on Aspirin 81 mg po BID for DVT prophylaxis and will need for 6 weeks post-op.   - Soft tissue finally amenable to surgery on 7/1. Patient taken to the OR on 7/1/2025 and underwent ORIF of left trimalleolar ankle fracture without fixation of the posterior malleolus and open fixation of syndesmosis by Dr. Yi Walter.   - Post-op, patient is touch down weight bearing for 6 weeks to left lower extremity per Ortho.   - Prevena wound vac in place to incision. Patient to return to Orthopedic clinic 7 days after discharge from the hospital for removal of the Prevena Restore wound vac, wound check and conversion to a dry dressing with a CAM boot. Patient will also need an appointment June 24 for suture removal and X-rays of left ankle.   - Patient got single shot nerve block today so at present denies any left ankle pain on 7/1.  - Fall Precautions.    "

## 2025-07-01 NOTE — TRANSFER OF CARE
"Anesthesia Transfer of Care Note    Patient: Caity Ashby    Procedure(s) Performed: Procedure(s) (LRB):  ORIF, ANKLE, LEFT (Left)  FIXATION, SYNDESMOSIS, ANKLE (Left)    Patient location: PACU    Anesthesia Type: general    Transport from OR: Transported from OR on 6-10 L/min O2 by face mask with adequate spontaneous ventilation    Post pain: adequate analgesia    Post assessment: no apparent anesthetic complications    Post vital signs: stable    Level of consciousness: awake, alert and oriented    Nausea/Vomiting: no nausea/vomiting    Complications: none    Transfer of care protocol was followed    Last vitals: Visit Vitals  BP (!) 179/79 (BP Location: Left arm, Patient Position: Lying)   Pulse 68   Temp 36.5 °C (97.7 °F) (Oral)   Resp 16   Ht 5' 6" (1.676 m)   Wt 60.8 kg (133 lb 15.9 oz)   SpO2 99%   Breastfeeding No   BMI 21.63 kg/m²     "

## 2025-07-01 NOTE — ANESTHESIA POSTPROCEDURE EVALUATION
Anesthesia Post Evaluation    Patient: Caity Ashby    Procedure(s) Performed: Procedure(s) (LRB):  ORIF, ANKLE, LEFT (Left)  FIXATION, SYNDESMOSIS, ANKLE (Left)    Final Anesthesia Type: general      Patient location during evaluation: PACU  Patient participation: Yes- Able to Participate  Level of consciousness: awake and alert and oriented  Post-procedure vital signs: reviewed and stable  Pain management: adequate  Airway patency: patent    PONV status at discharge: No PONV  Anesthetic complications: no      Cardiovascular status: blood pressure returned to baseline  Respiratory status: unassisted, spontaneous ventilation and room air  Hydration status: euvolemic  Follow-up not needed.              Vitals Value Taken Time   /60 07/01/25 10:56   Temp 36.4 °C (97.5 °F) 07/01/25 10:56   Pulse 77 07/01/25 10:56   Resp 20 07/01/25 10:56   SpO2 100 % 07/01/25 10:56         Event Time   Out of Recovery 07/01/2025 10:00:00         Pain/Wade Score: Pain Rating Prior to Med Admin: 4 (7/1/2025  6:50 AM)  Pain Rating Post Med Admin: 2 (7/1/2025  6:55 AM)  Wade Score: 9 (7/1/2025 10:00 AM)

## 2025-07-01 NOTE — OP NOTE
OPERATIVE REPORT    DATE OF PROCEDURE/OPERATION:  07/01/2025     SURGEON:  Yi Walter MD     ASSISTANT:    Resident - Tahmina Chinchilla MD     PREOPERATIVE DIAGNOSES:    Left Trimalleolar ankle fracture      POSTOPERATIVE DIAGNOSES:    Same     PROCEDURES PERFORMED:    Open reduction internal fixation Left trimalleolar ankle fracture without fixation of the posterior malleolus (CPT 91731)   Open treatment of syndesmosis (CPT 62801)      COMPLICATIONS:    None.      ESTIMATED BLOOD LOSS:    Minimal     IMPLANTS USED:    Synthes distal fibula locking plate with 3.5mm cortical screws and 2.mm locking screws, one independent 2.7mm lag screw  3.5mm cortical screw in the medial malleolus     SURGICAL INDICATION:    Caity is a 85-year-old female with hypertension who tripped down stairs at [employer].  She had immediate pain and deformity in the ankle.  She presented to the ER on 06/22/2025 and was found to have an ankle fracture dislocation.  The ankle was closed reduced and placed in a splint.  She developed fracture blisters anteromedially so surgery was delayed until swelling and blisters improved.  Her skin was evaluated this morning.  The blisters are present anteromedially and proximal as well distal and posterior at the heel.  However at the incision sites there are no blisters and she has skin wrinkling.  We discussed that due to the significant soft tissue injury that occurred as well as the poor quality of her native tissue, she is at risk for wound breakdown and infection.    We discussed risks, benefits and alternatives.  We discussed the potential complications related to this injury which include, but are not limited to implant failure, infection, wound complications, stiffness, post-traumatic osteoarthritis, injury to surrounding structures, chronic pain, blood clot, loss of limb, loss of life. Informed consent was obtained.     DESCRIPTION OF PROCEDURE:    The patient was brought to the operative suite.   Adequate anesthesia was administered.  The surgical site marking was confirmed to be correct.  The patient was positioned in the supine position with protective wedges and bumps.  The operative extremity was pre-scrubbed with chlorhexidine and alcohol. The surgical site was then sterilely prepped with ChloraPrep and draped in a sterile fashion.  A time-out was performed to confirm administration of prophylactic antibiotics, correct surgical site, surgical and anesthetic team members, expected surgical time and blood loss, availability of surgical equipment, and availability of blood products. The limb was exsanguinated and the tourniquet set to 300mg. At this point, I commenced the surgical procedure.       I made a longitudinal incision directly over the fibula.   The peroneals were elevated off of the fibula.  The hematoma was debrided. The fracture was reduced with a pointed reduction clamp.  A 2.7mm lag screw as well as a 1.6mm K wire was used to hold the fracture reduction. The clamp was removed. A distal fibula locking plate was placed in the appropriate position. A 3.5mm screw was placed to secure the plate to bone. Two additional cortical screws were placed proximal and then all of the 2.7mm locking screw distally. The wire was removed. I then turned my attention to the medial side. I made a longitudinal incision over the medial ankle.  The saphenous vein was retracted anteriorly and periosteum removed from the fracture site.  There was a large piece of free delaminated cartilage that I excised from the joint which had come off of the medial talus.  I debrided the fracture site of hematoma and placed a pointed reduction clamp to reduce the fracture.  X-rays demonstrated anatomic reduction.  I then placed a 3.5 mm bicortical screw to hold the reduction.  The clamp was removed.  I then turned my attention to the syndesmosis.  The syndesmosis was intact anteriorly however based on the CT scan there was  disruption of the posterior ligaments.  In order to confer   more stability due to this disruption, I placed a 3.5 mm quadricortical screw.    The wound was thoroughly irrigated. Vancomycin powder was then placed in the wound. The skin was closed with 2-0 monocryl and 3-0 nylon.  Due to the significant amount of swelling and blistering, I elected to place an Prevena Restor incisional VAC. The dressing was applied and patient placed in a short leg splint.     POSTOPERATIVE PLAN:    1. Weight bearing: Touch down weight bearing for 6 weeks.   2. Antibiotics: None needed.  3. DVT Prophylaxis: ASA 8mg BID for 6 weeks.  4. Return to clinic 7 days after discharge from the hospital for removal of the Prevena Restore, wound check and conversion to a dry dressing with a CAM boot. She will also need an appointment July 24 for suture removal and Xrs.

## 2025-07-01 NOTE — PLAN OF CARE
Problem: Adult Inpatient Plan of Care  Goal: Plan of Care Review  Outcome: Progressing  Goal: Patient-Specific Goal (Individualized)  Outcome: Progressing  Goal: Absence of Hospital-Acquired Illness or Injury  Outcome: Progressing  Goal: Optimal Comfort and Wellbeing  Outcome: Progressing  Goal: Readiness for Transition of Care  Outcome: Progressing     Problem: Skin Injury Risk Increased  Goal: Skin Health and Integrity  Outcome: Progressing     Problem: Fall Injury Risk  Goal: Absence of Fall and Fall-Related Injury  Outcome: Progressing     Problem: Fatigue  Goal: Improved Activity Tolerance  Outcome: Progressing     Problem: Infection  Goal: Absence of Infection Signs and Symptoms  Outcome: Progressing     Problem: Wound  Goal: Optimal Coping  Outcome: Progressing  Goal: Optimal Functional Ability  Outcome: Progressing  Goal: Absence of Infection Signs and Symptoms  Outcome: Progressing  Goal: Improved Oral Intake  Outcome: Progressing  Goal: Optimal Pain Control and Function  Outcome: Progressing  Goal: Skin Health and Integrity  Outcome: Progressing  Goal: Optimal Wound Healing  Outcome: Progressing

## 2025-07-01 NOTE — PLAN OF CARE
Problem: Adult Inpatient Plan of Care  Goal: Plan of Care Review  Outcome: Progressing  Goal: Absence of Hospital-Acquired Illness or Injury  Outcome: Progressing  Goal: Optimal Comfort and Wellbeing  Outcome: Progressing  Goal: Readiness for Transition of Care  Outcome: Progressing     Problem: Adult Inpatient Plan of Care  Goal: Plan of Care Review  Outcome: Progressing     Problem: Adult Inpatient Plan of Care  Goal: Optimal Comfort and Wellbeing  Outcome: Progressing     Problem: Adult Inpatient Plan of Care  Goal: Readiness for Transition of Care  Outcome: Progressing    PRN medication effective for pain  Explained plan of care, verbalized understanding. Educated pt .on new medicine . No injury during shift, Side rails up x 2, call light by bedside.   Spouse remained at bedside Left leg remained elevated and Iced

## 2025-07-01 NOTE — ASSESSMENT & PLAN NOTE
Caity Ashby is a 85 y.o. female with a left trimalleolar ankle fracture.  Presented closed, neurovascularly intact, and without any other musculoskeletal injuries on physical exam.  Closed reduction was performed in the emergency department.  The patient was admitted given concern for her to safely remain nonweightbearing at home.    She has been mobilizing with therapy and keeping the leg iced and elevated while at rest.  She continues to have a serous fracture blister over the anterior aspect of the distal leg.  She has moderate swelling throughout; however, her skin wrinkles, is mobile, in his amenable to surgery.  Plan for ORIF left ankle tomorrow, 07/01/2025.     Plan:   NWB to the LLE  DVT prophylaxis: ASA 81mg bid  Aggressively ice and elevate the LLE on a double stack of sheets at all times when in bed or in the chair.  Multimodal pain control, limiting narcotics.  NPO midnight    Dispo: Skin amenable to surgery. Plan for surgery today 07/01

## 2025-07-01 NOTE — PT/OT/SLP RE-EVAL
Occupational Therapy   Re-evaluation and Co-Treatment  Co-treatment performed due to patient's multiple deficits requiring two skilled therapists to appropriately and safely assess patient's strength and endurance while facilitating functional tasks in addition to accommodating for patient's activity tolerance.      Name: Caity Ashby  MRN: [id number]  Admitting Diagnosis:  Closed displaced trimalleolar fracture of left ankle  Recent Surgery: Procedure(s) (LRB):  ORIF, ANKLE, LEFT (Left)  FIXATION, SYNDESMOSIS, ANKLE (Left) * Day of Surgery *    Recommendations:     Discharge Recommendations: High Intensity Therapy  Discharge Equipment Recommendations: bedside commode, shower chair, walker, rolling, wheelchair  Barriers to discharge:  Decreased caregiver support    Assessment:     Caity Ashby is a 85 y.o. female with a medical diagnosis of Closed displaced trimalleolar fracture of left ankle.  She presents s/p (L) ankle ORIF POD0. Pt demo'd good functional balance and strength however deferred sit<>stand trial 2/2 fatigue. Goals updated. Performance deficits affecting function are weakness, impaired endurance, impaired self care skills, impaired functional mobility, gait instability, impaired balance, decreased coordination, decreased lower extremity function, decreased safety awareness, decreased ROM, impaired cardiopulmonary response to activity, orthopedic precautions. Patient has demonstrated sufficient progression to warrant high intensity therapy evidenced by objectives noted below.     Rehab Prognosis:  Good; patient would benefit from acute skilled OT services to address these deficits and reach maximum level of function.       Plan:     Patient to be seen 4 x/week to address the above listed problems via self-care/home management, therapeutic activities, therapeutic exercises, neuromuscular re-education  Plan of Care Expires: 07/15/25  Plan of Care Reviewed with: patient,  spouse    Subjective     Chief Complaint: fatigue  Patient/Family stated goals: to progress toward goals  Communicated with: RN prior to session.  Pain/Comfort:  Pain Rating 1: 0/10  Pain Rating Post-Intervention 1: 0/10    Objective:   Additional staff present: DANIEL Baer    Communicated with: RNZoe prior to session.  Patient found HOB elevated with: cryotherapy, FCD, oxygen, PureWick, peripheral IV, wound vac upon OT entry to room.    General Precautions: Standard, fall  Orthopedic Precautions: LLE toe touch weight bearing  Braces:  (short cast L LE)  Respiratory Status: Nasal cannula, flow 2 L/min    Occupational Performance:    Bed Mobility:    Patient completed Scooting/Bridging with stand by assistance  Patient completed Supine to Sit with stand by assistance  Patient completed Sit to Supine with stand by assistance  Patient tolerated sitting edge of bed for ~6 minutes with SBA    Functional Mobility/Transfers:  STS t/f deferred by pt 2/2 fatigue    Activities of Daily Living:  Lower Body Dressing: minimum assistance to pull R sock above heel at bed-level    Cognitive/Visual Perceptual:  Cognitive/Psychosocial Skills:     -       Oriented to: Person, Place, Time, and Situation   -       Follows Commands/attention:Follows multistep  commands  -       Safety awareness/insight to disability: impaired   -       Mood/Affect/Coping skills/emotional control: Cooperative and Pleasant    Physical Exam:  Sensation:    -       Intact  Upper Extremity Range of Motion:     -       Right Upper Extremity: WFL  -       Left Upper Extremity: WFL  Upper Extremity Strength:    -       Right Upper Extremity: WFL  -       Left Upper Extremity: WFL    AMPA 6 Click:  AMPA Total Score: 16    Treatment & Education:  -Education on weightbearing precautions  -Education on energy conservation and task modification to maximize safety and (I) during ADLs and mobility  -Education on importance of OOB activity to improve overall  activity tolerance and promote recovery  -Pt educated to call for assistance and to transfer with hospital staff only  -Provided education regarding role of OT, POC, & discharge recommendations with pt and spouse verbalizing understanding.  Pt had no further questions & when asked whether there were any concerns pt reported none.     Patient left HOB elevated with all lines intact, call button in reach, RN notified, and spouse present    GOALS:   Multidisciplinary Problems       Occupational Therapy Goals          Problem: Occupational Therapy    Goal Priority Disciplines Outcome Interventions   Occupational Therapy Goal     OT, PT/OT Progressing    Description: Goals to be met by: 7/6/2025     Patient will increase functional independence with ADLs by performing:    UE Dressing with Modified [place].  LE Dressing with Stand-by Assistance using appropriate AE as needed.  Grooming while standing at sink with Stand-by Assistance.  Toileting from toilet/bedside commode over toilet with Stand-by Assistance for hygiene and clothing management.   Supine to sit with Modified [place].  Step transfer with Stand-by Assistance with RW.  Toilet transfer to toilet/bedside commode with Stand-by Assistance with RW.                         DME Justifications:   Caity requires a commode for home use because she is confined to a single room.  Caity Ashby has a mobility limitation that significantly impairs her ability to participate in one or more mobility related activities of daily living (MRADLs) such as toileting, feeding, dressing, grooming, and bathing in customary locations in the home.  The mobility limitation cannot be sufficiently resolved by the use of a cane or walker.   The use of a manual wheelchair will significantly improve the patients ability to participate in MRADLS and the patient will use it on regular basis in the home.  Caity Ashby has expressed her willingness to use a manual  wheelchair in the home. Patients upper body strength is sufficient for propulsion.  She also has a caregiver who is available, willing, and able to provide assistance with the wheelchair when needed.     Caity's mobility limitation cannot be sufficiently resolved by the use of a cane. Her functional mobility deficit can be sufficiently resolved with the use of a Rolling Walker. Patient's mobility limitation significantly impairs their ability to participate in one of more activities of daily living.  The use of a RW will significantly improve the patient's ability to participate in MRADLS and the patient will use it on regular basis in the home.    History:     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    Calcium channel blocker adverse reaction 07/22/2014    Gum disease      Hypertension     Thyroid disease          Past Surgical History:   Procedure Laterality Date    COSMETIC SURGERY      HYSTERECTOMY         Time Tracking:     OT Date of Treatment: 07/01/25  OT Start Time: 1421  OT Stop Time: 1440  OT Total Time (min): 19 min    Billable Minutes:Re-eval 10  Neuromuscular Re-education 9    7/1/2025

## 2025-07-01 NOTE — SUBJECTIVE & OBJECTIVE
"Principal Problem:Closed displaced trimalleolar fracture of left ankle    Principal Orthopedic Problem:  As above    Interval History:  Afebrile, hemodynamically stable.  Pain is well-controlled.  Understands the plan for ORIF left ankle today    NPO since midnight marked booked and consented for surgery today.    Review of patient's allergies indicates:   Allergen Reactions    Boniva [ibandronate] Other (See Comments)     SHOULDER PAIN    Demerol [meperidine]     Ramipril Swelling       Current Facility-Administered Medications   Medication    0.9% NaCl infusion    acetaminophen tablet 1,000 mg    aspirin EC tablet 81 mg    atorvastatin tablet 20 mg    dextrose 50% injection 12.5 g    dextrose 50% injection 25 g    EScitalopram oxalate tablet 10 mg    fentaNYL 50 mcg/mL injection  mcg    ferrous sulfate tablet 1 each    glucagon (human recombinant) injection 1 mg    glucose chewable tablet 16 g    glucose chewable tablet 24 g    levothyroxine tablet 125 mcg    melatonin tablet 6 mg    methocarbamoL tablet 500 mg    midazolam injection 0.5-4 mg    morphine injection 2 mg    mupirocin 2 % ointment    naloxone 0.4 mg/mL injection 0.02 mg    ondansetron injection 4 mg    oxyCODONE immediate release tablet 5 mg    oxyCODONE immediate release tablet Tab 10 mg    pregabalin capsule 50 mg    sodium chloride 0.9% flush 10 mL    sumatriptan tablet 100 mg     Objective:     Vital Signs (Most Recent):  Temp: 97.7 °F (36.5 °C) (07/01/25 0521)  Pulse: 69 (07/01/25 0606)  Resp: 14 (07/01/25 0605)  BP: (!) 185/85 (07/01/25 0605)  SpO2: 96 % (07/01/25 0605) Vital Signs (24h Range):  Temp:  [97.5 °F (36.4 °C)-98.3 °F (36.8 °C)] 97.7 °F (36.5 °C)  Pulse:  [61-78] 69  Resp:  [14-18] 14  SpO2:  [92 %-97 %] 96 %  BP: (126-185)/(59-85) 185/85     Weight: 60.8 kg (133 lb 15.9 oz)  Height: 5' 6" (167.6 cm)  Body mass index is 21.63 kg/m².      Intake/Output Summary (Last 24 hours) at 7/1/2025 0613  Last data filed at 7/1/2025 " 0522  Gross per 24 hour   Intake 0 ml   Output 600 ml   Net -600 ml        Ortho/SPM Exam  Gen: NAD, sitting comfortably in bed  CV: regular rate  Resp: non-labored respirations    LLE:   Short-leg splint in place.  Splint iced and elevated.    Splint windowed, there is a serous blister over the anterior aspect of the distal tibia.  The skin of the anterior and lateral aspect of the distal leg wrinkle   Swelling significantly improved  Sensation intact to light touch throughout.    Wiggles all toes.  Brisk capillary refill to all toes     Significant Labs: All pertinent labs within the past 24 hours have been reviewed.    Significant Imaging: I have reviewed all pertinent imaging results/findings.

## 2025-07-01 NOTE — ANESTHESIA PROCEDURE NOTES
Intubation    Date/Time: 7/1/2025 7:11 AM    Performed by: Rivka Duff CRNA  Authorized by: Radha Emerson MD    Intubation:     Induction:  Intravenous    Mask Ventilation:  Easy mask    Attempts:  1    Attempted By:  CRNA    Method of Intubation:  Video laryngoscopy    Blade:  Orta 3    Laryngeal View Grade: Grade I - full view of cords      Difficult Airway Encountered?: No      Complications:  None    Airway Device:  Oral endotracheal tube    Airway Device Size:  7.0    Style/Cuff Inflation:  Cuffed    Inflation Amount (mL):  5    Tube secured:  22    Secured at:  The teeth    Placement Verified By:  Capnometry    Complicating Factors:  None    Findings Post-Intubation:  BS equal bilateral

## 2025-07-01 NOTE — PLAN OF CARE
Problem: Physical Therapy  Goal: Physical Therapy Goal  Description: Goals to be met by: 7/15/2025     Patient will increase functional independence with mobility by performin. Supine to sit with [place]  2. Sit to supine with [place]  3. Rolling to Left and Right with [place].  4. Bed to chair transfer with Supervision using Rolling Walker  5. Gait  x 100 feet with Supervision using Rolling Walker.   6. Ascend/Descend 6 inch curb step with Contact Guard Assistance using Rolling Walker.  7. Lower extremity exercise program x10 reps per handout, with supervision    Outcome: Progressing

## 2025-07-01 NOTE — ASSESSMENT & PLAN NOTE
Patients blood pressure range in the last 24 hours was: BP  Min: 90/49  Max: 190/81.The patient's inpatient anti-hypertensive regimen is listed below:  Current Antihypertensives   None    Plan  - BP is controlled, no changes needed to their regimen. Home HCTZ and Losartan on hold as patient having lower BPs in hospital and held for surgery this am and plan to resume post-op as BP tolerates.   - Goal BP < 140/90.

## 2025-07-02 LAB
ABSOLUTE EOSINOPHIL (OHS): 0.02 K/UL
ABSOLUTE MONOCYTE (OHS): 1.01 K/UL (ref 0.3–1)
ABSOLUTE NEUTROPHIL COUNT (OHS): 7.15 K/UL (ref 1.8–7.7)
ANION GAP (OHS): 9 MMOL/L (ref 8–16)
BASOPHILS # BLD AUTO: 0.02 K/UL
BASOPHILS NFR BLD AUTO: 0.2 %
BUN SERPL-MCNC: 17 MG/DL (ref 8–23)
CALCIUM SERPL-MCNC: 8 MG/DL (ref 8.7–10.5)
CHLORIDE SERPL-SCNC: 111 MMOL/L (ref 95–110)
CO2 SERPL-SCNC: 18 MMOL/L (ref 23–29)
CREAT SERPL-MCNC: 0.7 MG/DL (ref 0.5–1.4)
ERYTHROCYTE [DISTWIDTH] IN BLOOD BY AUTOMATED COUNT: 12.5 % (ref 11.5–14.5)
GFR SERPLBLD CREATININE-BSD FMLA CKD-EPI: >60 ML/MIN/1.73/M2
GLUCOSE SERPL-MCNC: 102 MG/DL (ref 70–110)
HCT VFR BLD AUTO: 31 % (ref 37–48.5)
HGB BLD-MCNC: 10.2 GM/DL (ref 12–16)
IMM GRANULOCYTES # BLD AUTO: 0.03 K/UL (ref 0–0.04)
IMM GRANULOCYTES NFR BLD AUTO: 0.3 % (ref 0–0.5)
LYMPHOCYTES # BLD AUTO: 1.12 K/UL (ref 1–4.8)
MCH RBC QN AUTO: 33.4 PG (ref 27–31)
MCHC RBC AUTO-ENTMCNC: 32.9 G/DL (ref 32–36)
MCV RBC AUTO: 102 FL (ref 82–98)
NUCLEATED RBC (/100WBC) (OHS): 0 /100 WBC
PLATELET # BLD AUTO: 292 K/UL (ref 150–450)
PMV BLD AUTO: 10.3 FL (ref 9.2–12.9)
POTASSIUM SERPL-SCNC: 3.8 MMOL/L (ref 3.5–5.1)
RBC # BLD AUTO: 3.05 M/UL (ref 4–5.4)
RELATIVE EOSINOPHIL (OHS): 0.2 %
RELATIVE LYMPHOCYTE (OHS): 12 % (ref 18–48)
RELATIVE MONOCYTE (OHS): 10.8 % (ref 4–15)
RELATIVE NEUTROPHIL (OHS): 76.5 % (ref 38–73)
SODIUM SERPL-SCNC: 138 MMOL/L (ref 136–145)
WBC # BLD AUTO: 9.35 K/UL (ref 3.9–12.7)

## 2025-07-02 PROCEDURE — 25000003 PHARM REV CODE 250: Performed by: PHYSICIAN ASSISTANT

## 2025-07-02 PROCEDURE — 25000003 PHARM REV CODE 250: Performed by: INTERNAL MEDICINE

## 2025-07-02 PROCEDURE — 97535 SELF CARE MNGMENT TRAINING: CPT

## 2025-07-02 PROCEDURE — 97116 GAIT TRAINING THERAPY: CPT

## 2025-07-02 PROCEDURE — 85025 COMPLETE CBC W/AUTO DIFF WBC: CPT | Performed by: INTERNAL MEDICINE

## 2025-07-02 PROCEDURE — 63600175 PHARM REV CODE 636 W HCPCS

## 2025-07-02 PROCEDURE — 25000003 PHARM REV CODE 250: Performed by: HOSPITALIST

## 2025-07-02 PROCEDURE — 80048 BASIC METABOLIC PNL TOTAL CA: CPT | Performed by: INTERNAL MEDICINE

## 2025-07-02 PROCEDURE — 25000003 PHARM REV CODE 250

## 2025-07-02 PROCEDURE — 11000001 HC ACUTE MED/SURG PRIVATE ROOM

## 2025-07-02 PROCEDURE — 25000003 PHARM REV CODE 250: Performed by: NURSE PRACTITIONER

## 2025-07-02 PROCEDURE — 36415 COLL VENOUS BLD VENIPUNCTURE: CPT | Performed by: INTERNAL MEDICINE

## 2025-07-02 PROCEDURE — 97530 THERAPEUTIC ACTIVITIES: CPT

## 2025-07-02 RX ORDER — ACETAMINOPHEN 500 MG
1000 TABLET ORAL EVERY 8 HOURS
Status: ON HOLD
Start: 2025-07-02

## 2025-07-02 RX ORDER — CHOLECALCIFEROL (VITAMIN D3) 25 MCG
1000 TABLET ORAL DAILY
Status: ON HOLD
Start: 2025-07-03

## 2025-07-02 RX ORDER — FERROUS SULFATE 325(65) MG
325 TABLET, DELAYED RELEASE (ENTERIC COATED) ORAL DAILY
Status: ON HOLD
Start: 2025-07-02

## 2025-07-02 RX ORDER — SUMATRIPTAN SUCCINATE 100 MG/1
100 TABLET ORAL
Status: ON HOLD
Start: 2025-07-02 | End: 2025-08-01

## 2025-07-02 RX ORDER — DOCUSATE SODIUM 100 MG/1
200 CAPSULE, LIQUID FILLED ORAL ONCE
Status: COMPLETED | OUTPATIENT
Start: 2025-07-02 | End: 2025-07-02

## 2025-07-02 RX ORDER — METHOCARBAMOL 500 MG/1
500 TABLET, FILM COATED ORAL 3 TIMES DAILY
Status: ON HOLD
Start: 2025-07-02

## 2025-07-02 RX ORDER — ASPIRIN 81 MG/1
81 TABLET ORAL 2 TIMES DAILY
Status: ON HOLD | COMMUNITY
Start: 2025-07-02 | End: 2025-08-13

## 2025-07-02 RX ORDER — PREGABALIN 50 MG/1
50 CAPSULE ORAL 2 TIMES DAILY
Status: ON HOLD
Start: 2025-07-02

## 2025-07-02 RX ORDER — ESCITALOPRAM OXALATE 10 MG/1
10 TABLET ORAL DAILY
Status: ON HOLD
Start: 2025-07-02

## 2025-07-02 RX ORDER — OXYCODONE HYDROCHLORIDE 10 MG/1
10 TABLET ORAL EVERY 4 HOURS PRN
Status: ON HOLD
Start: 2025-07-02

## 2025-07-02 RX ORDER — TALC
6 POWDER (GRAM) TOPICAL NIGHTLY PRN
Status: ON HOLD
Start: 2025-07-02

## 2025-07-02 RX ORDER — OXYCODONE HYDROCHLORIDE 5 MG/1
5 TABLET ORAL EVERY 4 HOURS PRN
Status: ON HOLD
Start: 2025-07-02

## 2025-07-02 RX ADMIN — METHOCARBAMOL 500 MG: 500 TABLET ORAL at 09:07

## 2025-07-02 RX ADMIN — SENNOSIDES 8.6 MG: 8.6 TABLET, FILM COATED ORAL at 07:07

## 2025-07-02 RX ADMIN — METHOCARBAMOL 500 MG: 500 TABLET ORAL at 08:07

## 2025-07-02 RX ADMIN — FERROUS SULFATE TAB EC 325 MG (65 MG FE EQUIVALENT) 1 EACH: 325 (65 FE) TABLET DELAYED RESPONSE at 08:07

## 2025-07-02 RX ADMIN — DOCUSATE SODIUM 200 MG: 100 CAPSULE, LIQUID FILLED ORAL at 10:07

## 2025-07-02 RX ADMIN — CHOLECALCIFEROL TAB 25 MCG (1000 UNIT) 1000 UNITS: 25 TAB at 08:07

## 2025-07-02 RX ADMIN — ATORVASTATIN CALCIUM 20 MG: 20 TABLET, FILM COATED ORAL at 09:07

## 2025-07-02 RX ADMIN — FERROUS SULFATE TAB EC 325 MG (65 MG FE EQUIVALENT) 1 EACH: 325 (65 FE) TABLET DELAYED RESPONSE at 09:07

## 2025-07-02 RX ADMIN — OXYCODONE HYDROCHLORIDE 10 MG: 10 TABLET ORAL at 07:07

## 2025-07-02 RX ADMIN — ACETAMINOPHEN 1000 MG: 500 TABLET ORAL at 01:07

## 2025-07-02 RX ADMIN — OXYCODONE HYDROCHLORIDE 10 MG: 10 TABLET ORAL at 04:07

## 2025-07-02 RX ADMIN — OXYCODONE 5 MG: 5 TABLET ORAL at 12:07

## 2025-07-02 RX ADMIN — ACETAMINOPHEN 1000 MG: 500 TABLET ORAL at 04:07

## 2025-07-02 RX ADMIN — PREGABALIN 50 MG: 50 CAPSULE ORAL at 09:07

## 2025-07-02 RX ADMIN — METHOCARBAMOL 500 MG: 500 TABLET ORAL at 02:07

## 2025-07-02 RX ADMIN — ASPIRIN 81 MG: 81 TABLET, COATED ORAL at 08:07

## 2025-07-02 RX ADMIN — PREGABALIN 50 MG: 50 CAPSULE ORAL at 08:07

## 2025-07-02 RX ADMIN — ACETAMINOPHEN 1000 MG: 500 TABLET ORAL at 09:07

## 2025-07-02 RX ADMIN — CEFAZOLIN 2 G: 2 INJECTION, POWDER, FOR SOLUTION INTRAMUSCULAR; INTRAVENOUS at 12:07

## 2025-07-02 RX ADMIN — SENNOSIDES 8.6 MG: 8.6 TABLET, FILM COATED ORAL at 12:07

## 2025-07-02 RX ADMIN — LEVOTHYROXINE SODIUM 125 MCG: 0.12 TABLET ORAL at 04:07

## 2025-07-02 RX ADMIN — ASPIRIN 81 MG: 81 TABLET, COATED ORAL at 09:07

## 2025-07-02 RX ADMIN — OXYCODONE HYDROCHLORIDE 10 MG: 10 TABLET ORAL at 01:07

## 2025-07-02 RX ADMIN — ESCITALOPRAM OXALATE 10 MG: 10 TABLET ORAL at 08:07

## 2025-07-02 NOTE — PT/OT/SLP PROGRESS
Physical Therapy Treatment    Patient Name:  Caity Ashby   MRN:  [id number]    Recommendations:     Discharge Recommendations: High Intensity Therapy  Discharge Equipment Recommendations: walker, rolling (transport WC)  Barriers to discharge: Inaccessible home and increased level of assistance    Assessment:     Caity Ashby is a 85 y.o. female admitted with a medical diagnosis of Closed displaced trimalleolar fracture of left ankle.  She presents with the following impairments/functional limitations: weakness, impaired endurance, impaired self care skills, impaired functional mobility, gait instability, impaired balance, decreased lower extremity function, pain, edema, orthopedic precautions. Pt would benefit from high intensity/frequency therapy for: Dynamic/static standing/sitting balance through skilled balance training, strengthening with the use of skilled therapeutic exercises interventions, mobility and safety training to ensure safe discharge home through skilled patient and caregiver education, and mobility through adaptive equipment training. Pt highly motivated to return to independent PLOF and can tolerate 3+hours of therapy. Pt continues to benefit from a collaborative multidisciplinary program to improve quality of life and focus on recovery of impairments.    Rehab Prognosis: Good; patient would benefit from acute skilled PT services to address these deficits and reach maximum level of function.    Recent Surgery: Procedure(s) (LRB):  ORIF, ANKLE, LEFT (Left)  FIXATION, SYNDESMOSIS, ANKLE (Left) 1 Day Post-Op    Plan:     During this hospitalization, patient to be seen 4 x/week to address the identified rehab impairments via gait training, therapeutic activities, therapeutic exercises, neuromuscular re-education and progress toward the following goals:    Plan of Care Expires:  07/31/25    Subjective     Chief Complaint: 8/10 R hip pain, pt reporting it to be chronic and impaired  functional mobility  Patient/Family Comments/goals: return to PLOF  Pain/Comfort:  Pain Rating 1: 8/10  Location - Side 1: Right  Location 1: groin (pt reporting it to be chronic)  Pain Addressed 1: Reposition, Distraction, Cessation of Activity, Nurse notified  Pain Rating Post-Intervention 1: 8/10      Objective:     Communicated with RN prior to session.  Patient found HOB elevated with FCD, PureWick, peripheral IV, wound vac upon PT entry to room.     General Precautions: Standard, fall  Orthopedic Precautions: LLE toe touch weight bearing  Braces:  (LLE cast)  Respiratory Status: Room air     Functional Mobility:  Bed Mobility:     Scooting: supervision  Supine to Sit: supervision  Transfers:     Sit to Stand:  contact guard assistance with rolling walker, 1 trial from bed and 1 trial from chair  Gait: 20 ft total, RW, Raf; decreased gait speed, hop to gait pattern, occasional assistance with managing RW, 2-3 brief breaks in standing      AM-PAC 6 CLICK MOBILITY  Turning over in bed (including adjusting bedclothes, sheets and blankets)?: 4  Sitting down on and standing up from a chair with arms (e.g., wheelchair, bedside commode, etc.): 3  Moving from lying on back to sitting on the side of the bed?: 3  Moving to and from a bed to a chair (including a wheelchair)?: 3  Need to walk in hospital room?: 3  Climbing 3-5 steps with a railing?: 1  Basic Mobility Total Score: 17       Treatment & Education:  Pt re-educated on [place] status.   Patient educated on role of therapy, goals of session, and benefits of mobilizing.   Discussed PT plan of care during hospitalization.   Patient educated on calling for assistance.   Patient educated on how their diagnosis impacts their mobility within PT scope of practice.   Communication board up to date.  All questions answered within PT scope of practice.      Patient left HOB elevated with all lines intact, call button in reach, bed alarm on, and RN notified..    GOALS:    Multidisciplinary Problems       Physical Therapy Goals          Problem: Physical Therapy    Goal Priority Disciplines Outcome Interventions   Physical Therapy Goal     PT, PT/OT Progressing    Description: Goals to be met by: 7/15/2025     Patient will increase functional independence with mobility by performin. Supine to sit with [place]  2. Sit to supine with [place]  3. Rolling to Left and Right with [place].  4. Bed to chair transfer with Supervision using Rolling Walker  5. Gait  x 100 feet with Supervision using Rolling Walker.   6. Ascend/Descend 6 inch curb step with Contact Guard Assistance using Rolling Walker.  7. Lower extremity exercise program x10 reps per handout, with supervision                         DME Justifications:   Caity's mobility limitation cannot be sufficiently resolved by the use of a cane. Her functional mobility deficit can be sufficiently resolved with the use of a Rolling Walker. Patient's mobility limitation significantly impairs their ability to participate in one of more activities of daily living.  The use of a RW will significantly improve the patient's ability to participate in MRADLS and the patient will use it on regular basis in the home.    Time Tracking:     PT Received On: 25  PT Start Time: 1300     PT Stop Time: 1316  PT Total Time (min): 16 min     Billable Minutes: Gait Training 16    Treatment Type: Treatment  PT/PTA: PT     Number of PTA visits since last PT visit: 0     2025

## 2025-07-02 NOTE — ASSESSMENT & PLAN NOTE
Related to ankle fracture. PT/OT consulted and working with patient. Progressive mobility protocol initiated. Fall precautions. Plan discharge to [facility] on discharge to continue PT/OT.

## 2025-07-02 NOTE — ASSESSMENT & PLAN NOTE
Present on admit. No previous diagnosis in chart but GFR appears to have been 40-50 since 2020. Creatine stable for now. BMP reviewed- noted Estimated Creatinine Clearance: 57 mL/min (based on SCr of 0.7 mg/dL). according to latest data. Based on current GFR, CKD stage is stage 3a. Monitor UOP and serial BMP and adjust therapy as needed. Renally dose meds. Avoid nephrotoxic medications and procedures.

## 2025-07-02 NOTE — ASSESSMENT & PLAN NOTE
Patients blood pressure range in the last 24 hours was: BP  Min: 90/49  Max: 190/81.The patient's inpatient anti-hypertensive regimen is listed below:  Current Antihypertensives   None    Plan  - BP is controlled, no changes needed to their regimen. Home HCTZ and Losartan on hold as patient having lower BPs in hospital and held for surgery this am and plan to resume on discharge to rehab.   - Goal BP < 140/90.

## 2025-07-02 NOTE — ASSESSMENT & PLAN NOTE
Caity Ashby is a 85 y.o. female with a left trimalleolar ankle fracture.  Presented closed, neurovascularly intact, and without any other musculoskeletal injuries on physical exam.  Closed reduction was performed in the emergency department.  The patient was admitted given concern for her to safely remain nonweightbearing at home.    ORIF left ankle tomorrow, 07/01/2025.     1. Weight bearing: Touch down weight bearing for 6 weeks.   2. Antibiotics: None needed.  3. DVT Prophylaxis: ASA 8mg BID for 6 weeks.  4. Return to clinic 7 days after discharge from the hospital for removal of the Prevena Restore, wound check and conversion to a dry dressing with a CAM boot. She will also need an appointment July 24 for suture removal and Xrs.     Dispo: [facility]

## 2025-07-02 NOTE — PROGRESS NOTES
[facility] Medicine  Progress Note    Patient Name: Caity Ashby  MRN: [id number]  Patient Class: IP- Inpatient   Admission Date: 6/21/2025  Length of Stay: 8 days  Attending Physician: Carmita Zuniga MD  Primary Care Provider: Erasmo Jones MD        Subjective     Principal Problem:Closed displaced trimalleolar fracture of left ankle        HPI:  Caity Ashby is a 85 y.o. female with a PMHx of HTN, HLD, arthritis, anxiety/depression, hypothyroidism, and migraines who presented to the ED for obvious LLE deformity after a fall. Patient states that she was walking down her stairs when she accidentally missed the bottom step causing her foot to go under her as she fell to the ground. Patient denies LOC or head trauma. She had immediate severe pain and a noticeable deformity to her left ankle. Denies numbness/tingling in her LLE. Denies sustaining any additional injuries. She reports prior to the fall she was in her normal state of health and denies CP, SOB, cough, fever/chills, abdominal pain, N/V/D, or urinary complaints.    In the ED, hypertensive initially otherwise VSS, afebrile. CBC unremarkable. Na 135. K 3.1. Bicarb 20. Anion gap 17. Cr 1.3 (bl~1.1). Calcium 10.6. Xray L foot with fracture involving the proximal aspect of the 2nd metatarsal and possibly 3rd. No significant overlying edema, correlation with any focal tenderness is recommended as this could reflect irregular healing of prior fracture. X-ray L ankle revealed an avulsion fracture involving the distal aspect of the fibula noting displacement of the distal fracture fragment. There is posterior dislocation of the tibiotalar articulation.There is questionable fracture of the medial malleolus though not confirmed. Post reduction x-ray ankle revealing interval placement of overlying cast material, which limits fine bony and soft tissue detail. Improved alignment of ankle fracture and improved alignment of the  "ankle joint. Suspected trimalleolar ankle fracture now demonstrates anatomic alignment. CT L ankle pending. Orthopedics consulted in the ED and performed reduction of left ankle and short leg splint applied. Recommend DVT ppx with ASA bid and plan for operative fixation in the coming weeks.The patient received 2g Ancef, 4mg Zofran, 50mcg Fentanyl x2 doses.     Overview/Hospital Course:  Patient was walking down her stairs when she accidentally missed the bottom step causing her left foot to go under her as she fell to the ground. Patient unable to bear weight to left foot and ankle so EMS called and patient brought to ED. Imaging in ED showed "Trimalleolar ankle fracture with near anatomic alignment. Remote or subacute nondisplaced fracture of the 2nd metatarsal." Orthopedics consulted in ED and left ankle reduced and splinted in ED. Patient placed non weight bearing to left lower extremity. Left leg elevated and iced with plans for operative fixation of left ankle fracture once soft tissue swelling improves. PT/OT consulted while patient in hospital and recommending high intensity on discharge when medically ready and plan discharge to [facility] when medically ready after her ankle fracture surgery. Patient placed on multimodals for pain management. Patient placed on Aspirin 81 mg po BID for DVT prophylaxis. Pain controlled to left ankle. Ortho re-evaluated patient and state soft tissue amenable to repair and patient taken to the OR on 7/1/2025 and underwent ORIF of left trimalleolar ankle fracture without fixation of the posterior malleolus and open fixation of syndesmosis by Dr. Yi Walter. Post-op, patient is touch down weight bearing for 6 weeks to left lower extremity per Ortho. Patient continued on Aspirin 81 mg po BID for DVT prophylaxis and will need for 6 weeks. Patient to return to clinic 7 days after discharge from the hospital for removal of the Prevena Restore wound vac, wound check and " conversion to a dry dressing with a CAM boot. Patient will also need an appointment June 24 for suture removal and X-rays of left ankle. PT/OT re consulted post-op to work with patient. Patient continued on multimodals for pain management.     Interval History: Patient doing well this am and states left ankle pain is well controlled. Patient states pain only 3/10 this am. She is complaining of some right groin pain today but she states for some reason when she gets anxious her right groin always hurts when that happens and she feels anxious today about having surgery and going to Rehab tomorrow. I reassured patient and her  that patient is doing well. Labs reviewed and are stable. Hgb stable at 10.2 and was 9.8 when last checked. Creatinine stable at 0.7. Spoke with patient and her  at bedside on rounds and answered their questions. I explained to them plan is to discharge to rehab with Prevena wound vac and that Dr. Walter stated would return to clinic in 1 week and they would remove wound vac and place dry dressing and place in CAM boot. I told them rehab could transport patient back and forth for her clinic appointment as likely to still be in rehab when her appointment is due. Patient worked with PT/OT today and recommending high intensity recommendations. [facility] has been following patient in hospital and plan is for patient to be discharged to [facility] tomorrow and patient and her  aware and agreeable to plan.    Review of Systems   Constitutional:  Negative for fever.   Respiratory:  Negative for cough and shortness of breath.    Cardiovascular:  Negative for chest pain.   Gastrointestinal:  Negative for abdominal pain and nausea.   Musculoskeletal:  Positive for arthralgias (Left ankle).   Psychiatric/Behavioral:  Negative for agitation and confusion.      Objective:     Vital Signs (Most Recent):  Temp: 97.5 °F (36.4 °C) (07/02/25 1532)  Pulse: 70 (07/02/25 1532)  Resp:  18 (07/02/25 1532)  BP: 102/51 (07/02/25 1532)  SpO2: 94 % (07/02/25 1532) on room air  Vital Signs (24h Range):  Temp:  [97.4 °F (36.3 °C)-98 °F (36.7 °C)] 97.5 °F (36.4 °C)  Pulse:  [70-82] 70  Resp:  [15-18] 18  SpO2:  [92 %-97 %] 94 %  BP: (102-169)/(51-72) 102/51     Weight: 60.8 kg (133 lb 15.9 oz)  Body mass index is 21.63 kg/m².    Intake/Output Summary (Last 24 hours) at 7/2/2025 1543  Last data filed at 7/2/2025 0814  Gross per 24 hour   Intake --   Output 200 ml   Net -200 ml         Physical Exam  Vitals and nursing note reviewed.   Constitutional:       General: She is awake. She is not in acute distress.     Appearance: Normal appearance. She is well-developed and normal weight. She is not ill-appearing.      Comments: Patient laying in bed and left leg elevated and  at bedside. Patient awake and alert and oriented x 4.    Cardiovascular:      Rate and Rhythm: Normal rate and regular rhythm.      Heart sounds: Normal heart sounds. No murmur heard.  Pulmonary:      Effort: Pulmonary effort is normal. No respiratory distress.      Breath sounds: Normal breath sounds. No wheezing.   Abdominal:      General: Abdomen is flat. Bowel sounds are normal. There is no distension.      Palpations: Abdomen is soft.      Tenderness: There is no abdominal tenderness.   Musculoskeletal:      Left lower leg: No edema.      Comments: Left ankle in hard splint and wound vac in place.   Skin:     General: Skin is warm.      Findings: No erythema.   Neurological:      Mental Status: She is alert and oriented to person, place, and time.   Psychiatric:         Mood and Affect: Mood normal.         Behavior: Behavior normal. Behavior is cooperative.         Thought Content: Thought content normal.         Judgment: Judgment normal.               Significant Labs: BMP:   Recent Labs   Lab 07/02/25  0241         K 3.8   *   CO2 18*   BUN 17   CREATININE 0.7   CALCIUM 8.0*     CBC:   Recent Labs   Lab  "07/02/25  0241   WBC 9.35   HGB 10.2*   HCT 31.0*          Significant Imaging: I have reviewed all pertinent imaging results/findings within the past 24 hours.      Assessment & Plan  Closed displaced trimalleolar fracture of left ankle s/p ORIF on 7/1/2025  Pathological fracture of left ankle due to osteoporosis with routine healing  Patient was walking down her stairs when she accidentally missed the bottom step causing her left foot to go under her as she fell to the ground. Patient unable to bear weight to left foot and ankle so EMS called and patient brought to ED. Imaging in ED showed "Trimalleolar ankle fracture with near anatomic alignment. Remote or subacute nondisplaced fracture of the 2nd metatarsal." Fracture related to osteoporosis.   - Orthopedics consulted in ED and left ankle reduced and splinted in ED.   - Left leg elevated and iced with plans for operative fixation of left ankle fracture once swelling improves.   - PT/OT re consulted post-op and plan discharge to [facility] when medically ready after her ankle fracture surgery. Therapy stating patient needs high intensity on discharge on re-evaluation on 7/2. Plan to discharge to [facility] for 7/3.   - Continue on multimodals for pain management with Tylenol 1000 mg po every 8 hours Robaxin 500 mg po TID as pain controlled to left ankle. Pain controlled.   - Continue on Aspirin 81 mg po BID for DVT prophylaxis and will need for 6 weeks post-op.   - Soft tissue finally amenable to surgery on 7/1. Patient taken to the OR on 7/1/2025 and underwent ORIF of left trimalleolar ankle fracture without fixation of the posterior malleolus and open fixation of syndesmosis by Dr. Yi Walter.   - Post-op, patient is touch down weight bearing for 6 weeks to left lower extremity per Ortho.   - Prevena wound vac in place to incision. Patient to return to Orthopedic clinic 7 days after discharge from the hospital for removal of the " Prevena Restore wound vac, wound check and conversion to a dry dressing with a CAM boot. Patient will also need an appointment June 24 for suture removal and X-rays of left ankle.   - Patient got single shot nerve block today so at present denies any left ankle pain on 7/1.  - Fall Precautions.    Closed displaced fracture of second metatarsal bone of left foot  CT imaging on admit showed remote or subacute nondisplaced fracture of the 2nd metatarsal of left foot. Orthopedics evaluated and no treatment or intervention needed.     Primary hypertension  Patients blood pressure range in the last 24 hours was: BP  Min: 90/49  Max: 190/81.The patient's inpatient anti-hypertensive regimen is listed below:  Current Antihypertensives   None    Plan  - BP is controlled, no changes needed to their regimen. Home HCTZ and Losartan on hold as patient having lower BPs in hospital and held for surgery this am and plan to resume on discharge to rehab.   - Goal BP < 140/90.     Stage 3a chronic kidney disease  Present on admit. No previous diagnosis in chart but GFR appears to have been 40-50 since 2020. Creatine stable for now. BMP reviewed- noted Estimated Creatinine Clearance: 57 mL/min (based on SCr of 0.7 mg/dL). according to latest data. Based on current GFR, CKD stage is stage 3a. Monitor UOP and serial BMP and adjust therapy as needed. Renally dose meds. Avoid nephrotoxic medications and procedures.  Acquired hypothyroidism  Patient has chronic hypothyroidism. TFTs reviewed-   Lab Results   Component Value Date    TSH 2.778 01/21/2023     Will continue chronic Levothyroxine to treat and adjust for and clinical changes.  Primary osteoarthritis of left knee  Chronic condition and controlled. Patient on Celebrex and PRN Voltaren gel and Skelaxin at home to treat and holding in hospital for now.     Anxiety and depression  Patient has recurrent depression which is moderate and is currently controlled. Will Continue  anti-depressant medications with Lexapro to treat his depression and anxiety. We will not consult psychiatry at this time. Patient does not display psychosis at this time. Continue to monitor closely and adjust plan of care as needed.    Pure hypercholesterolemia  Chronic and controlled. Continue Lipitor to treat. Monitor clinically. Last LDL was   Lab Results   Component Value Date    LDLCALC 94 12/16/2020      Iron deficiency anemia secondary to inadequate dietary iron intake  Controlled. Anemia is likely due to iron deficiency, suspect poor intake per  as up to date on C scope/ pap as not longer requires as previous normal and age. No bleeding reprted nor dark stools.     Plan  - Transfuse PRBC if patient becomes hemodynamically unstable, symptomatic or H/H drops below 7/21.  - Patient has not received any PRBC transfusions to date.  - Patient's anemia is currently stable.  - Continue oral iron replacement to treat and continue on discharge.   Chronic headaches  Chronic and controlled. Continue home Imitrex PRN to treat.     Other reduced mobility  Related to ankle fracture. PT/OT consulted and working with patient. Progressive mobility protocol initiated. Fall precautions. Plan discharge to [facility] on discharge to continue PT/OT.     VTE Risk Mitigation (From admission, onward)           Ordered     Place sequential compression device  Until discontinued         06/29/25 1246     IP VTE HIGH RISK PATIENT  Once         06/29/25 1246     Place sequential compression device  Until discontinued         06/21/25 2305                    Discharge Planning   MANINDER: 7/3/2025     Code Status: Full Code   Discharge Plan A: Rehab ([facility]) for 7/3.      Carmita Zuniga MD  Department of [facility] Medicine   [facility] - Surgery

## 2025-07-02 NOTE — PLAN OF CARE
Problem: Adult Inpatient Plan of Care  Goal: Plan of Care Review  Outcome: Progressing  Goal: Optimal Comfort and Wellbeing  Outcome: Progressing  Goal: Readiness for Transition of Care  Outcome: Progressing     Problem: Adult Inpatient Plan of Care  Goal: Plan of Care Review  Outcome: Progressing     Problem: Adult Inpatient Plan of Care  Goal: Optimal Comfort and Wellbeing  Outcome: Progressing     Problem: Adult Inpatient Plan of Care  Goal: Readiness for Transition of Care  Outcome: Progressing  . PRN medication effective. Explained plan of care, verbalized understanding. Educated pt .on new medicine . No injury during shift, Side rails up x 2, call light by bedside.  Leg remained elevated and iced

## 2025-07-02 NOTE — ASSESSMENT & PLAN NOTE
Controlled. Anemia is likely due to iron deficiency, suspect poor intake per  as up to date on C scope/ pap as not longer requires as previous normal and age. No bleeding reprted nor dark stools.     Plan  - Transfuse PRBC if patient becomes hemodynamically unstable, symptomatic or H/H drops below 7/21.  - Patient has not received any PRBC transfusions to date.  - Patient's anemia is currently stable.  - Continue oral iron replacement to treat and continue on discharge.

## 2025-07-02 NOTE — PROGRESS NOTES
Occupational Therapy   Treatment    Name: Caity Ashby  MRN: [id number]  Admitting Diagnosis:  Closed displaced trimalleolar fracture of left ankle  1 Day Post-Op    Recommendations:     Discharge Recommendations: High Intensity Therapy  Discharge Equipment Recommendations:  walker, rolling, wheelchair  Barriers to discharge:  Decreased caregiver support    Assessment:     Caity Ashby is a 85 y.o. female with a medical diagnosis of Closed displaced trimalleolar fracture of left ankle.  She presents with good motivation and participation to improve self OOB to maximize ADL participation in acute setting. Performance deficits affecting function are weakness, impaired endurance, impaired self care skills, impaired functional mobility, impaired balance, decreased coordination, decreased lower extremity function, decreased safety awareness, orthopedic precautions, gait instability.     Rehab Prognosis:  Good; patient would benefit from acute skilled OT services to address these deficits and reach maximum level of function.       Plan:     Patient to be seen 4 x/week to address the above listed problems via self-care/home management, therapeutic activities, therapeutic exercises, neuromuscular re-education, cognitive retraining, wheelchair management/training  Plan of Care Expires: 07/15/25  Plan of Care Reviewed with: patient    Subjective     Chief Complaint: none  Patient/Family Comments/goals: agreed to tx  Pain/Comfort:  Pain Rating 1: 0/10    Objective:     Communicated with: nurse prior to session.  Patient found HOB elevated with FCD, PureWick, wound vac, and ice pack upon OT entry to room.    General Precautions: Standard, fall    Orthopedic Precautions:LLE toe touch weight bearing  Braces:  (short cast L LE)  Respiratory Status: Room air     Occupational Performance:     Bed Mobility:    Patient completed Scooting/Bridging with stand by assistance  Patient completed Supine to Sit with contact  guard assistance  Patient completed Sit to Supine with minimum assistance     Functional Mobility/Transfers:  Patient completed Sit <> Stand Transfer with contact guard assistance  with  rolling walker   Patient completed Bed <> BSC Transfer using Step Transfer technique with minimum assistance with rolling walker and L LE TTWB.     Activities of Daily Living:  Lower Body Dressing: stand by assistance to readjust R sock at EOB    Therex:  Instructed in UE therex with reps of 10 (horizontal shoulder raises, overhead arm presses, chest presses, scapular retraction)      [facility] 6 Click ADL: 18    Treatment & Education:  Pt educated on the following topics:  OT 's plan of care and purpose of visit, ADLs, importance of increased activity in hospital setting, transfer training, bed mobility, body mechanics, assistive device, modifications/compensatory strategies, sequencing, safety precautions, fall prevention, post-op precautions, equipment recommendations, energy conservation techniques, and to call for assistance with call button  Understanding was verbalized.     Patient left HOB elevated with all lines intact, call button in reach, nurse notified, and L LE elevated    GOALS:   Multidisciplinary Problems       Occupational Therapy Goals          Problem: Occupational Therapy    Goal Priority Disciplines Outcome Interventions   Occupational Therapy Goal     OT, PT/OT Progressing    Description: Goals to be met by: 7/6/2025     Patient will increase functional independence with ADLs by performing:    UE Dressing with Modified Sanders.  LE Dressing with Stand-by Assistance using appropriate AE as needed.  Grooming while standing at sink with Stand-by Assistance.  Toileting from toilet/bedside commode over toilet with Stand-by Assistance for hygiene and clothing management.   Supine to sit with Modified Sanders.  Step transfer with Stand-by Assistance with RW.  Toilet transfer to toilet/bedside commode with  Stand-by Assistance with RW.                         DME Justifications:   Caity's mobility limitation cannot be sufficiently resolved by the use of a cane. Her functional mobility deficit can be sufficiently resolved with the use of a Rolling Walker. Patient's mobility limitation significantly impairs their ability to participate in one of more activities of daily living.  The use of a RW will significantly improve the patient's ability to participate in MRADLS and the patient will use it on regular basis in the home.    Time Tracking:     OT Date of Treatment: 07/02/25  OT Start Time: 1053  OT Stop Time: 1120  OT Total Time (min): 27 min    Billable Minutes:Self Care/Home Management 10  Therapeutic Activity 17    OT/LIONEL: OT     Number of LIONEL visits since last OT visit: 0    7/2/2025

## 2025-07-02 NOTE — SUBJECTIVE & OBJECTIVE
"Principal Problem:Closed displaced trimalleolar fracture of left ankle    Principal Orthopedic Problem:  As above    Interval History:  NAEON, patient stable, pain controlled. No acute complaints this morning. Splint and prevena in place     Review of patient's allergies indicates:   Allergen Reactions    Boniva [ibandronate] Other (See Comments)     SHOULDER PAIN    Demerol [meperidine]     Ramipril Swelling       Current Facility-Administered Medications   Medication    acetaminophen tablet 1,000 mg    aspirin EC tablet 81 mg    atorvastatin tablet 20 mg    dextrose 50% injection 12.5 g    dextrose 50% injection 25 g    EScitalopram oxalate tablet 10 mg    ferrous sulfate tablet 1 each    glucagon (human recombinant) injection 1 mg    glucose chewable tablet 16 g    glucose chewable tablet 24 g    levothyroxine tablet 125 mcg    melatonin tablet 6 mg    methocarbamoL tablet 500 mg    morphine injection 2 mg    naloxone 0.4 mg/mL injection 0.02 mg    ondansetron injection 4 mg    oxyCODONE immediate release tablet 5 mg    oxyCODONE immediate release tablet Tab 10 mg    pregabalin capsule 50 mg    senna tablet 8.6 mg    sodium chloride 0.9% flush 10 mL    sumatriptan tablet 100 mg    vitamin D 1000 units tablet 1,000 Units     Objective:     Vital Signs (Most Recent):  Temp: 97.9 °F (36.6 °C) (07/02/25 0730)  Pulse: 74 (07/02/25 0730)  Resp: 18 (07/02/25 0730)  BP: (!) 169/72 (07/02/25 0730)  SpO2: 96 % (07/02/25 0730) Vital Signs (24h Range):  Temp:  [97.4 °F (36.3 °C)-98.4 °F (36.9 °C)] 97.9 °F (36.6 °C)  Pulse:  [74-90] 74  Resp:  [15-20] 18  SpO2:  [89 %-100 %] 96 %  BP: (121-169)/(59-72) 169/72     Weight: 60.8 kg (133 lb 15.9 oz)  Height: 5' 6" (167.6 cm)  Body mass index is 21.63 kg/m².      Intake/Output Summary (Last 24 hours) at 7/2/2025 0819  Last data filed at 7/1/2025 1950  Gross per 24 hour   Intake 1000 ml   Output 200 ml   Net 800 ml        Ortho/SPM Exam  Gen: NAD, sitting comfortably in bed  CV: " regular rate  Resp: non-labored respirations    LLE:   Short-leg splint in place  Sensation intact to light touch throughout.    Wiggles all toes.  Brisk capillary refill to all toes     Significant Labs: All pertinent labs within the past 24 hours have been reviewed.    Significant Imaging: I have reviewed all pertinent imaging results/findings.

## 2025-07-02 NOTE — ASSESSMENT & PLAN NOTE
"Patient was walking down her stairs when she accidentally missed the bottom step causing her left foot to go under her as she fell to the ground. Patient unable to bear weight to left foot and ankle so EMS called and patient brought to ED. Imaging in ED showed "Trimalleolar ankle fracture with near anatomic alignment. Remote or subacute nondisplaced fracture of the 2nd metatarsal." Fracture related to osteoporosis.   - Orthopedics consulted in ED and left ankle reduced and splinted in ED.   - Left leg elevated and iced with plans for operative fixation of left ankle fracture once swelling improves.   - PT/OT re consulted post-op and plan discharge to [facility] when medically ready after her ankle fracture surgery. Therapy stating patient needs high intensity on discharge on re-evaluation on 7/2. Plan to discharge to [facility] for 7/3.   - Continue on multimodals for pain management with Tylenol 1000 mg po every 8 hours Robaxin 500 mg po TID as pain controlled to left ankle. Pain controlled.   - Continue on Aspirin 81 mg po BID for DVT prophylaxis and will need for 6 weeks post-op.   - Soft tissue finally amenable to surgery on 7/1. Patient taken to the OR on 7/1/2025 and underwent ORIF of left trimalleolar ankle fracture without fixation of the posterior malleolus and open fixation of syndesmosis by Dr. Yi Walter.   - Post-op, patient is touch down weight bearing for 6 weeks to left lower extremity per Ortho.   - Prevena wound vac in place to incision. Patient to return to Orthopedic clinic 7 days after discharge from the hospital for removal of the Prevena Restore wound vac, wound check and conversion to a dry dressing with a CAM boot. Patient will also need an appointment June 24 for suture removal and X-rays of left ankle.   - Patient got single shot nerve block today so at present denies any left ankle pain on 7/1.  - Fall Precautions.    "

## 2025-07-02 NOTE — SUBJECTIVE & OBJECTIVE
Interval History: Patient doing well this am and states left ankle pain is well controlled. Patient states pain only 3/10 this am. She is complaining of some right groin pain today but she states for some reason when she gets anxious her right groin always hurts when that happens and she feels anxious today about having surgery and going to Rehab tomorrow. I reassured patient and her  that patient is doing well. Labs reviewed and are stable. Hgb stable at 10.2 and was 9.8 when last checked. Creatinine stable at 0.7. Spoke with patient and her  at bedside on rounds and answered their questions. I explained to them plan is to discharge to rehab with Prevena wound vac and that Dr. Walter stated would return to clinic in 1 week and they would remove wound vac and place dry dressing and place in CAM boot. I told them rehab could transport patient back and forth for her clinic appointment as likely to still be in rehab when her appointment is due. Patient worked with PT/OT today and recommending high intensity recommendations. [facility] has been following patient in hospital and plan is for patient to be discharged to [facility] tomorrow and patient and her  aware and agreeable to plan.    Review of Systems   Constitutional:  Negative for fever.   Respiratory:  Negative for cough and shortness of breath.    Cardiovascular:  Negative for chest pain.   Gastrointestinal:  Negative for abdominal pain and nausea.   Musculoskeletal:  Positive for arthralgias (Left ankle).   Psychiatric/Behavioral:  Negative for agitation and confusion.      Objective:     Vital Signs (Most Recent):  Temp: 97.5 °F (36.4 °C) (07/02/25 1532)  Pulse: 70 (07/02/25 1532)  Resp: 18 (07/02/25 1532)  BP: 102/51 (07/02/25 1532)  SpO2: 94 % (07/02/25 1532) on room air  Vital Signs (24h Range):  Temp:  [97.4 °F (36.3 °C)-98 °F (36.7 °C)] 97.5 °F (36.4 °C)  Pulse:  [70-82] 70  Resp:  [15-18] 18  SpO2:  [92 %-97 %] 94 %  BP:  (102-169)/(51-72) 102/51     Weight: 60.8 kg (133 lb 15.9 oz)  Body mass index is 21.63 kg/m².    Intake/Output Summary (Last 24 hours) at 7/2/2025 1543  Last data filed at 7/2/2025 0827  Gross per 24 hour   Intake --   Output 200 ml   Net -200 ml         Physical Exam  Vitals and nursing note reviewed.   Constitutional:       General: She is awake. She is not in acute distress.     Appearance: Normal appearance. She is well-developed and normal weight. She is not ill-appearing.      Comments: Patient laying in bed and left leg elevated and  at bedside. Patient awake and alert and oriented x 4.    Cardiovascular:      Rate and Rhythm: Normal rate and regular rhythm.      Heart sounds: Normal heart sounds. No murmur heard.  Pulmonary:      Effort: Pulmonary effort is normal. No respiratory distress.      Breath sounds: Normal breath sounds. No wheezing.   Abdominal:      General: Abdomen is flat. Bowel sounds are normal. There is no distension.      Palpations: Abdomen is soft.      Tenderness: There is no abdominal tenderness.   Musculoskeletal:      Left lower leg: No edema.      Comments: Left ankle in hard splint and wound vac in place.   Skin:     General: Skin is warm.      Findings: No erythema.   Neurological:      Mental Status: She is alert and oriented to person, place, and time.   Psychiatric:         Mood and Affect: Mood normal.         Behavior: Behavior normal. Behavior is cooperative.         Thought Content: Thought content normal.         Judgment: Judgment normal.               Significant Labs: BMP:   Recent Labs   Lab 07/02/25  0241         K 3.8   *   CO2 18*   BUN 17   CREATININE 0.7   CALCIUM 8.0*     CBC:   Recent Labs   Lab 07/02/25  0241   WBC 9.35   HGB 10.2*   HCT 31.0*          Significant Imaging: I have reviewed all pertinent imaging results/findings within the past 24 hours.

## 2025-07-02 NOTE — PLAN OF CARE
[facility]     Department of Hospital Medicine     [address] [place] [address]     [phone] after hours  [phone] fax                                        FACILITY TRANSFER ORDERS     07/03/2025    Admit to: [facility]    Diagnoses:  Active Hospital Problems    Diagnosis  POA    *Closed displaced trimalleolar fracture of left ankle s/p ORIF on 7/1/2025 [S82.852A]  Yes     Priority: 1 - High    Pathological fracture of left ankle due to osteoporosis with routine healing [M80.072D]  Not Applicable     Priority: 1 - High    Closed displaced fracture of second metatarsal bone of left foot [S92.322A]  Yes     Priority: 2      2nd, possible 3rd on imaging      Primary hypertension [I10]  Yes     Priority: 3     Anxiety and depression [F41.9, F32.A]  Yes     Priority: 4     Iron deficiency anemia secondary to inadequate dietary iron intake [D50.8]  Yes     Priority: 6     Chronic headaches [R51.9, G89.29]  Yes     Priority: 7     Stage 3a chronic kidney disease [N18.31]  Yes     Priority: 8     Acquired hypothyroidism [E03.9]  Yes     Priority: 9     Primary osteoarthritis of left knee [M17.12]  Yes     Priority: 10     Pure hypercholesterolemia [E78.00]  Yes     Priority: 11     Other reduced mobility [Z74.09]  Yes     Patient with Acute on chronic debility due to fracture/prosthesis. Latest AMPAC and GEMS scores have been reviewed. Evaluation for etiology is complete. Plan includes - Progressive mobility protocol initated  - PT/OT consulted  - Fall precautions in place.          Resolved Hospital Problems    Diagnosis Date Resolved POA    Macrocytic anemia [D53.9] 06/26/2025 Yes     Priority: 5     Migraines [G43.909] 06/26/2025 Yes     Priority: 12     Constipation [K59.00] 06/25/2025 Yes     Priority: 13     Hypercalcemia [E83.52] 06/25/2025 Yes    Hypokalemia [E87.6] 06/25/2025 Yes       Vital Signs:  Routine.    Allergies:  Review of patient's allergies indicates:   Allergen Reactions    Boniva [ibandronate] Other (See Comments)     SHOULDER PAIN    Demerol [meperidine]     Ramipril Swelling       Code Status: Full Code     Diet: regular diet       Activities:   - Activity as tolerated   - Up in a chair each morning as tolerated   - Ambulate with assistance to bathroom   - May use walker, cane, or self-propelled wheelchair    Weight Bearing Status: Toe touchdown weight bearing to left lower extremity x 6 weeks post-op    Nursing: Out of bed BID, Up with assistance  Measure height and weight on admit    Nursing Precautions:      Fall precautions per nursing home protocol      Labs: Per facility protocol    CONSULTS:      Physical Therapy to evaluate and treat 5 times a week      Occupational Therapy to evaluate and treat 5 times a week       MISCELLANEOUS CARE:  Routine Skin for Bedridden Patients: Instruct patient/caregiver to apply moisture barrier cream to all skin folds and wet areas in perineal area daily and after baths and all bowel movements.      WOUND CARE ORDERS:  Prevena wound vac in place to incision and to remain in place until clinic follow-up on 7/10/2025. Patient to return to Orthopedic clinic on 7/10/2025 for wound vac removal of the Prevena Restore wound vac and wound check and conversion to a dry dressing with a CAM boot.       Medications:        Medication List        PAUSE taking these medications      estrogens (conjugated) 0.625 MG tablet  Wait to take this until your doctor or other care provider tells you to start again.  Hold while at [facility] and resume on home discharge.  Commonly known as: PREMARIN  Take 0.625 mg by mouth once daily.     valACYclovir 500 MG tablet  Wait to take this until your doctor or other care provider tells you to start again.  Hold while at [facility] and resume on home discharge.  Commonly known as: VALTREX  TAKE 1 TABLET BY MOUTH  EVERY DAY            START taking these medications      acetaminophen 500 MG tablet  Commonly known as: TYLENOL  Take 2 tablets (1,000 mg total) by mouth every 8 (eight) hours.     aspirin 81 MG EC tablet  Commonly known as: ECOTRIN  Take 1 tablet (81 mg total) by mouth 2 (two) times a day. DVT prophylaxis after ankle fracture surgery with end date 8/13/2025.     ferrous sulfate 325 (65 FE) MG EC tablet  Take 1 tablet (325 mg total) by mouth once daily.     melatonin 3 mg tablet  Commonly known as: MELATIN  Take 2 tablets (6 mg total) by mouth nightly as needed for Insomnia.     methocarbamoL 500 MG Tab  Commonly known as: Robaxin  Take 1 tablet (500 mg total) by mouth 3 (three) times daily.     * oxyCODONE 5 MG immediate release tablet  Commonly known as: ROXICODONE  Take 1 tablet (5 mg total) by mouth every 4 (four) hours as needed (Moderate pain 4-6/10).     * oxyCODONE 10 mg Tab immediate release tablet  Commonly known as: ROXICODONE  Take 1 tablet (10 mg total) by mouth every 4 (four) hours as needed (Severe pain 7-10/10).     pregabalin 50 MG capsule  Commonly known as: LYRICA  Take 1 capsule (50 mg total) by mouth 2 (two) times daily.     vitamin D 1000 units Tab  Commonly known as: VITAMIN D3  Take 1 tablet (1,000 Units total) by mouth once daily.           * This list has 2 medication(s) that are the same as other medications prescribed for you. Read the directions carefully, and ask your doctor or other care provider to review them with you.                CHANGE how you take these medications      EScitalopram oxalate 10 MG tablet  Commonly known as: LEXAPRO  Take 1 tablet (10 mg total) by mouth once daily.  What changed: when to take this     sumatriptan 100 MG tablet  Commonly known as: IMITREX  Take 1 tablet (100 mg total) by mouth every 2 (two) hours as needed for Migraine.  What changed:   how much to take  how to take this  when to take this  reasons to take this            CONTINUE taking these  medications      atorvastatin 20 MG tablet  Commonly known as: LIPITOR  Take 20 mg by mouth every evening.     hydroCHLOROthiazide 12.5 MG Tab  TAKE 1 TABLET BY MOUTH EVERY DAY     levothyroxine 125 MCG tablet  Commonly known as: SYNTHROID  TAKE 1 TABLET BY MOUTH EVERY MORNING     losartan 100 MG tablet  Commonly known as: COZAAR  TAKE 1 TABLET BY MOUTH EVERY DAY     meloxicam 15 MG tablet  Commonly known as: MOBIC  Take 15 mg by mouth once daily.            STOP taking these medications      diclofenac 75 MG EC tablet  Commonly known as: VOLTAREN     EPINEPHrine 0.3 mg/0.3 mL Atin  Commonly known as: EPIPEN     finasteride 1 mg tablet  Commonly known as: PROPECIA     metaxalone 800 MG tablet  Commonly known as: SKELAXIN                Follow-up:   Future Appointments   Date Time Provider Department Center   7/10/2025 10:30 AM Aubrey Monteiro NP [facility] [address] [place]   7/24/2025  9:15 AM Yi Walter MD [facility] [address]   8/13/2025  9:30 AM Yi Walter MD [facility] [address] [address]        _________________________________  Lisa Bloom MD  07/03/2025

## 2025-07-03 VITALS
OXYGEN SATURATION: 96 % | WEIGHT: 134 LBS | HEIGHT: 66 IN | DIASTOLIC BLOOD PRESSURE: 58 MMHG | SYSTOLIC BLOOD PRESSURE: 128 MMHG | HEART RATE: 65 BPM | RESPIRATION RATE: 17 BRPM | BODY MASS INDEX: 21.53 KG/M2 | TEMPERATURE: 97 F

## 2025-07-03 LAB
ABSOLUTE EOSINOPHIL (OHS): 0.46 K/UL
ABSOLUTE MONOCYTE (OHS): 0.86 K/UL (ref 0.3–1)
ABSOLUTE NEUTROPHIL COUNT (OHS): 5.43 K/UL (ref 1.8–7.7)
ALBUMIN SERPL BCP-MCNC: 2.7 G/DL (ref 3.5–5.2)
ALP SERPL-CCNC: 64 UNIT/L (ref 40–150)
ALT SERPL W/O P-5'-P-CCNC: 10 UNIT/L (ref 10–44)
ANION GAP (OHS): 7 MMOL/L (ref 8–16)
AST SERPL-CCNC: 26 UNIT/L (ref 11–45)
BASOPHILS # BLD AUTO: 0.07 K/UL
BASOPHILS NFR BLD AUTO: 0.8 %
BILIRUB SERPL-MCNC: 0.3 MG/DL (ref 0.1–1)
BUN SERPL-MCNC: 20 MG/DL (ref 8–23)
CALCIUM SERPL-MCNC: 8.5 MG/DL (ref 8.7–10.5)
CHLORIDE SERPL-SCNC: 111 MMOL/L (ref 95–110)
CO2 SERPL-SCNC: 19 MMOL/L (ref 23–29)
CREAT SERPL-MCNC: 0.8 MG/DL (ref 0.5–1.4)
ERYTHROCYTE [DISTWIDTH] IN BLOOD BY AUTOMATED COUNT: 12.9 % (ref 11.5–14.5)
GFR SERPLBLD CREATININE-BSD FMLA CKD-EPI: >60 ML/MIN/1.73/M2
GLUCOSE SERPL-MCNC: 83 MG/DL (ref 70–110)
HCT VFR BLD AUTO: 30.2 % (ref 37–48.5)
HGB BLD-MCNC: 9.7 GM/DL (ref 12–16)
IMM GRANULOCYTES # BLD AUTO: 0.03 K/UL (ref 0–0.04)
IMM GRANULOCYTES NFR BLD AUTO: 0.3 % (ref 0–0.5)
LYMPHOCYTES # BLD AUTO: 2.23 K/UL (ref 1–4.8)
MCH RBC QN AUTO: 33.3 PG (ref 27–31)
MCHC RBC AUTO-ENTMCNC: 32.1 G/DL (ref 32–36)
MCV RBC AUTO: 104 FL (ref 82–98)
NUCLEATED RBC (/100WBC) (OHS): 0 /100 WBC
PLATELET # BLD AUTO: 257 K/UL (ref 150–450)
PMV BLD AUTO: 10.5 FL (ref 9.2–12.9)
POTASSIUM SERPL-SCNC: 3.9 MMOL/L (ref 3.5–5.1)
PROT SERPL-MCNC: 5.7 GM/DL (ref 6–8.4)
RBC # BLD AUTO: 2.91 M/UL (ref 4–5.4)
RELATIVE EOSINOPHIL (OHS): 5.1 %
RELATIVE LYMPHOCYTE (OHS): 24.6 % (ref 18–48)
RELATIVE MONOCYTE (OHS): 9.5 % (ref 4–15)
RELATIVE NEUTROPHIL (OHS): 59.7 % (ref 38–73)
SODIUM SERPL-SCNC: 137 MMOL/L (ref 136–145)
WBC # BLD AUTO: 9.08 K/UL (ref 3.9–12.7)

## 2025-07-03 PROCEDURE — 85025 COMPLETE CBC W/AUTO DIFF WBC: CPT | Performed by: HOSPITALIST

## 2025-07-03 PROCEDURE — 25000003 PHARM REV CODE 250: Performed by: NURSE PRACTITIONER

## 2025-07-03 PROCEDURE — 80053 COMPREHEN METABOLIC PANEL: CPT | Performed by: HOSPITALIST

## 2025-07-03 PROCEDURE — 36415 COLL VENOUS BLD VENIPUNCTURE: CPT | Performed by: HOSPITALIST

## 2025-07-03 PROCEDURE — 25000003 PHARM REV CODE 250

## 2025-07-03 PROCEDURE — 99222 1ST HOSP IP/OBS MODERATE 55: CPT | Mod: ,,, | Performed by: NURSE PRACTITIONER

## 2025-07-03 PROCEDURE — 25000003 PHARM REV CODE 250: Performed by: HOSPITALIST

## 2025-07-03 RX ORDER — PSEUDOEPHEDRINE/ACETAMINOPHEN 30MG-500MG
100 TABLET ORAL
Status: COMPLETED | OUTPATIENT
Start: 2025-07-03 | End: 2025-07-03

## 2025-07-03 RX ORDER — SODIUM CHLORIDE 0.9 G/100ML
500 IRRIGANT IRRIGATION
Status: COMPLETED | OUTPATIENT
Start: 2025-07-03 | End: 2025-07-03

## 2025-07-03 RX ORDER — SYRING-NEEDL,DISP,INSUL,0.3 ML 29 G X1/2"
296 SYRINGE, EMPTY DISPOSABLE MISCELLANEOUS
Status: COMPLETED | OUTPATIENT
Start: 2025-07-03 | End: 2025-07-03

## 2025-07-03 RX ADMIN — SODIUM CHLORIDE 500 ML: 0.9 IRRIGANT IRRIGATION at 12:07

## 2025-07-03 RX ADMIN — OXYCODONE HYDROCHLORIDE 10 MG: 10 TABLET ORAL at 10:07

## 2025-07-03 RX ADMIN — FERROUS SULFATE TAB EC 325 MG (65 MG FE EQUIVALENT) 1 EACH: 325 (65 FE) TABLET DELAYED RESPONSE at 08:07

## 2025-07-03 RX ADMIN — ASPIRIN 81 MG: 81 TABLET, COATED ORAL at 08:07

## 2025-07-03 RX ADMIN — OXYCODONE HYDROCHLORIDE 10 MG: 10 TABLET ORAL at 05:07

## 2025-07-03 RX ADMIN — METHOCARBAMOL 500 MG: 500 TABLET ORAL at 03:07

## 2025-07-03 RX ADMIN — METHOCARBAMOL 500 MG: 500 TABLET ORAL at 08:07

## 2025-07-03 RX ADMIN — ACETAMINOPHEN 1000 MG: 500 TABLET ORAL at 01:07

## 2025-07-03 RX ADMIN — BE HEALTH MAGNESIUM CITRATE ORAL SOLUTION - LEMON 296 ML: 1.75 LIQUID ORAL at 12:07

## 2025-07-03 RX ADMIN — PREGABALIN 50 MG: 50 CAPSULE ORAL at 08:07

## 2025-07-03 RX ADMIN — LEVOTHYROXINE SODIUM 125 MCG: 0.12 TABLET ORAL at 05:07

## 2025-07-03 RX ADMIN — CHOLECALCIFEROL TAB 25 MCG (1000 UNIT) 1000 UNITS: 25 TAB at 08:07

## 2025-07-03 RX ADMIN — ESCITALOPRAM OXALATE 10 MG: 10 TABLET ORAL at 08:07

## 2025-07-03 RX ADMIN — ACETAMINOPHEN 1000 MG: 500 TABLET ORAL at 05:07

## 2025-07-03 RX ADMIN — Medication 100 ML: at 12:07

## 2025-07-03 NOTE — DISCHARGE SUMMARY
DISCHARGE SUMMARY  Hospital Medicine    [place] [facility]    Patient Name: Caity Ashby  YOB: 1940    Admit Date: 6/21/2025    Discharge Date: 07/03/2025    Discharge Attending Physician: Lisa Bloom MD     Principal Diagnoses:  Active Hospital Problems    Diagnosis  POA    *Closed displaced trimalleolar fracture of left ankle s/p ORIF on 7/1/2025 [S82.852A]  Yes    Other reduced mobility [Z74.09]  Yes     Patient with Acute on chronic debility due to fracture/prosthesis. Latest AMPAC and GEMS scores have been reviewed. Evaluation for etiology is complete. Plan includes - Progressive mobility protocol initated  - PT/OT consulted  - Fall precautions in place.        Iron deficiency anemia secondary to inadequate dietary iron intake [D50.8]  Yes    Chronic headaches [R51.9, G89.29]  Yes    Pure hypercholesterolemia [E78.00]  Yes    Stage 3a chronic kidney disease [N18.31]  Yes    Anxiety and depression [F41.9, F32.A]  Yes    Closed displaced fracture of second metatarsal bone of left foot [S92.322A]  Yes     2nd, possible 3rd on imaging      Pathological fracture of left ankle due to osteoporosis with routine healing [M80.072D]  Not Applicable    Primary osteoarthritis of left knee [M17.12]  Yes    Acquired hypothyroidism [E03.9]  Yes    Primary hypertension [I10]  Yes      Resolved Hospital Problems    Diagnosis Date Resolved POA    Constipation [K59.00] 06/25/2025 Yes    Macrocytic anemia [D53.9] 06/26/2025 Yes    Migraines [G43.909] 06/26/2025 Yes    Hypercalcemia [E83.52] 06/25/2025 Yes    Hypokalemia [E87.6] 06/25/2025 Yes       Discharged Condition: improved    Interval history- she looks very good this morning and the best I've seen her as was on service with her last week before her surgery with ortho this week and she reports was able to put makeup on this morning and feeling well this morning. No BM since surgery so enema ordered for her. At home dose dulcolax pills if  needs and hasb een on bowel regimen as had constipation early in stay jose r I was here a week ago and so she and  are amenable to this as mag citrate made her nauseated when ordered last week when had this issue then as needs BM before going to rehab. Prevena at bedside and hooked it up to charge it as changing to prevena for a week before going to rehab today. Showed her and  the parts with clip and the string to use for storing when mobilizing. Labs stable. BP ticking up so orders to resume home hctz for tomorrow at rehab as hav held here due to concerns would cause low BPS in periop period but BP has done well and really hasn't been low since initially on admit. Has had some normal ranges but had some 160s today so suspect will be fine to resume tomorrow I told them given this. Plan for wound check in a week and convert to dry dressings and cam boot per op note and suture removal on July 24th and xrays with Touch down weight bearing x 6 weeks per op note.   Awaiting rehab transfer pending BM/enema today. Discussed with CM and rehab NP diogenes for dispo plan.    Temp:  [97 °F (36.1 °C)-98.4 °F (36.9 °C)]   Pulse:  [60-78]   Resp:  [15-18]   BP: (102-165)/(51-76)   SpO2:  [92 %-96 %]        Physical Exam  Vitals and nursing note reviewed.   Constitutional:       General: She is awake. She is not in acute distress.     Appearance: Normal appearance. She is well-developed and normal weight. She is not ill-appearing.      Comments: Patient laying in bed and left leg elevated and  at bedside. Patient awake and alert and oriented x 4.    Cardiovascular:      Rate and Rhythm: Normal rate and regular rhythm.      Heart sounds: Normal heart sounds. No murmur heard.  Pulmonary:      Effort: Pulmonary effort is normal. No respiratory distress.      Breath sounds: Normal breath sounds. No wheezing.   Abdominal:      General: Abdomen is flat. Bowel sounds are normal. There is no distension.       Palpations: Abdomen is soft.      Tenderness: There is no abdominal tenderness.   Musculoskeletal:      Left lower leg: No edema.      Comments: Left ankle in hard splint and wound vac in place.   Skin:     General: Skin is warm.      Findings: No erythema.   Neurological:      Mental Status: She is alert and oriented to person, place, and time.   Psychiatric:         Mood and Affect: Mood normal.         Behavior: Behavior normal. Behavior is cooperative.         Thought Content: Thought content normal.         Judgment: Judgment normal.     HOSPITAL COURSE:      Initial Presentation:     Caity Ashby is a 85 y.o. female with a PMHx of HTN, HLD, arthritis, anxiety/depression, hypothyroidism, and migraines who presented to the ED for obvious LLE deformity after a fall. Patient states that she was walking down her stairs when she accidentally missed the bottom step causing her foot to go under her as she fell to the ground. Patient denies LOC or head trauma. She had immediate severe pain and a noticeable deformity to her left ankle. Denies numbness/tingling in her LLE. Denies sustaining any additional injuries. She reports prior to the fall she was in her normal state of health and denies CP, SOB, cough, fever/chills, abdominal pain, N/V/D, or urinary complaints.     In the ED, hypertensive initially otherwise VSS, afebrile. CBC unremarkable. Na 135. K 3.1. Bicarb 20. Anion gap 17. Cr 1.3 (bl~1.1). Calcium 10.6. Xray L foot with fracture involving the proximal aspect of the 2nd metatarsal and possibly 3rd. No significant overlying edema, correlation with any focal tenderness is recommended as this could reflect irregular healing of prior fracture. X-ray L ankle revealed an avulsion fracture involving the distal aspect of the fibula noting displacement of the distal fracture fragment. There is posterior dislocation of the tibiotalar articulation.There is questionable fracture of the medial malleolus though  "not confirmed. Post reduction x-ray ankle revealing interval placement of overlying cast material, which limits fine bony and soft tissue detail. Improved alignment of ankle fracture and improved alignment of the ankle joint. Suspected trimalleolar ankle fracture now demonstrates anatomic alignment. CT L ankle pending. Orthopedics consulted in the ED and performed reduction of left ankle and short leg splint applied. Recommend DVT ppx with ASA bid and plan for operative fixation in the coming weeks.The patient received 2g Ancef, 4mg Zofran, 50mcg Fentanyl x2 doses.     Course of Principle Problem for Admission:    Closed displaced trimalleolar fracture of left ankle s/p ORIF on 7/1/2025  Pathological fracture of left ankle due to osteoporosis with routine healing  Patient was walking down her stairs when she accidentally missed the bottom step causing her left foot to go under her as she fell to the ground. Patient unable to bear weight to left foot and ankle so EMS called and patient brought to ED. Imaging in ED showed "Trimalleolar ankle fracture with near anatomic alignment. Remote or subacute nondisplaced fracture of the 2nd metatarsal." Fracture related to osteoporosis.   - Orthopedics consulted in ED and left ankle reduced and splinted in ED.   - Left leg elevated and iced with plans for operative fixation of left ankle fracture once swelling improves.   - PT/OT re consulted post-op and plan discharge to [facility] when medically ready after her ankle fracture surgery. Therapy stating patient needs high intensity on discharge on re-evaluation on 7/2. Plan to discharge to [facility] for 7/3.   - Continue on multimodals for pain management with Tylenol 1000 mg po every 8 hours Robaxin 500 mg po TID as pain controlled to left ankle. Pain controlled.   - Continue on Aspirin 81 mg po BID for DVT prophylaxis and will need for 6 weeks post-op.   - Soft tissue finally amenable to surgery on 7/1. " Patient taken to the OR on 7/1/2025 and underwent ORIF of left trimalleolar ankle fracture without fixation of the posterior malleolus and open fixation of syndesmosis by Dr. Yi Walter.   - Post-op, patient is touch down weight bearing for 6 weeks to left lower extremity per Ortho.   - Prevena wound vac in place to incision. Patient to return to Orthopedic clinic 7 days after discharge from the hospital for removal of the Prevena Restore wound vac, wound check and conversion to a dry dressing with a CAM boot. Patient will also need an appointment June 24 for suture removal and X-rays of left ankle.   - Patient got single shot nerve block on day of surgery so pain has done well post op  -dc to rehab for further therapies and strengthening  - Fall Precautions.     Closed displaced fracture of second metatarsal bone of left foot  CT imaging on admit showed remote or subacute nondisplaced fracture of the 2nd metatarsal of left foot. Orthopedics evaluated and no treatment or intervention needed.      Primary hypertension  Patients blood pressure range in the last 24 hours was: BP  Min: 90/49  Max: 190/81.The patient's inpatient anti-hypertensive regimen is listed below:  Current Antihypertensives   None     Plan  - BP is controlled, no changes needed to their regimen. Home HCTZ and Losartan on hold as patient having lower BPs in hospital and held for surgery this am and plan to resume on discharge to rehab as BP has been normal for days and even higher at 160s at times in last 24 hours  - Goal BP < 140/90.      Stage 3a chronic kidney disease  Present on admit. No previous diagnosis in chart but GFR appears to have been 40-50 since 2020. Creatine stable for now. BMP reviewed- noted Estimated Creatinine Clearance: 57 mL/min (based on SCr of 0.7 mg/dL). according to latest data. Based on current GFR, CKD stage is stage 3a. Monitor UOP and serial BMP and adjust therapy as needed. Renally dose meds. Avoid nephrotoxic  medications and procedures.  Acquired hypothyroidism  Patient has chronic hypothyroidism. TFTs reviewed-         Lab Results   Component Value Date     TSH 2.778 01/21/2023      Will continue chronic Levothyroxine to treat and adjust for and clinical changes.  Primary osteoarthritis of left knee  Chronic condition and controlled. Patient on Celebrex and PRN Voltaren gel and Skelaxin at home to treat and holding in hospital for now and hold skelaxin/voltaren gel at IP rehab     Anxiety and depression  Patient has recurrent depression which is moderate and is currently controlled. Will Continue anti-depressant medications with Lexapro to treat his depression and anxiety. We will not consult psychiatry at this time. Patient does not display psychosis at this time. Continue to monitor closely and adjust plan of care as needed.     Pure hypercholesterolemia  Chronic and controlled. Continue Lipitor to treat. Monitor clinically. Last LDL was         Lab Results   Component Value Date     LDLCALC 94 12/16/2020      Iron deficiency anemia secondary to inadequate dietary iron intake  Controlled. Anemia is likely due to iron deficiency, suspect poor intake per  as up to date on C scope/ pap as not longer requires as previous normal and age. No bleeding reprted nor dark stools.      Plan  - Transfuse PRBC if patient becomes hemodynamically unstable, symptomatic or H/H drops below 7/21.  - Patient has not received any PRBC transfusions to date.  - Patient's anemia is currently stable.  - Continue oral iron replacement to treat and continue on discharge.   Recheck ferritin in 3 months to ensure improved    Chronic headaches  Chronic and controlled. Continue home Imitrex PRN to treat.      Other reduced mobility  Related to ankle fracture. PT/OT consulted and working with patient. Progressive mobility protocol initiated. Fall precautions.    discharge to [facility] on discharge to continue PT/OT.     Consults: ortho,  "PM and R    Last CBC/BMP:    CBC/Anemia Labs: Coags:    Recent Labs   Lab 06/28/25  0718 07/02/25  0241 07/03/25  0456   WBC 7.64 9.35 9.08   HGB 9.8* 10.2* 9.7*   HCT 29.9* 31.0* 30.2*    292 257   * 102* 104*   RDW 12.6 12.5 12.9    No results for input(s): "PT", "INR", "APTT" in the last 168 hours.     Chemistries:   Recent Labs   Lab 06/27/25  0429 06/28/25 0718 07/02/25  0241 07/03/25  0456    137 138 137   K 4.3 3.9 3.8 3.9    106 111* 111*   CO2 27 24 18* 19*   BUN 17 16 17 20   CREATININE 0.9 0.8 0.7 0.8   CALCIUM 8.3* 7.9* 8.0* 8.5*   PROT 5.9* 5.5*  --  5.7*   BILITOT 0.3 0.3  --  0.3   ALKPHOS 56 52  --  64   ALT 14 13  --  10   AST 25 20  --  26   MG 2.0 2.0  --   --               Special Treatments/Procedures:   Procedure(s) (LRB):  ORIF, ANKLE, LEFT (Left)  FIXATION, SYNDESMOSIS, ANKLE (Left)     Disposition: Rehab Facility      Future Scheduled Appointments:  Future Appointments   Date Time Provider Department Center   7/10/2025 10:30 AM Aubrey Monteiro NP [facility] [address] [place]   7/24/2025  9:15 AM Yi Walter MD [facility] [address] [address] [place]   8/13/2025  9:30 AM Yi Walter MD [facility] [place] [address]           Discharge Medication List:         Medication List        PAUSE taking these medications      estrogens (conjugated) 0.625 MG tablet  Wait to take this until your doctor or other care provider tells you to start again.  Hold [facility] at [facility] and resume on home discharge.  Commonly known as: PREMARIN     valACYclovir 500 MG tablet  Wait to take this until your doctor or other care provider tells you to start again.  Hold while at [facility] and resume on home discharge.  Commonly known as: VALTREX  TAKE 1 TABLET BY MOUTH EVERY DAY            START taking these medications      acetaminophen 500 MG tablet  Commonly known as: TYLENOL  Take 2 tablets (1,000 mg total) by mouth every 8 (eight) hours.     aspirin 81 MG EC " tablet  Commonly known as: ECOTRIN  Take 1 tablet (81 mg total) by mouth 2 (two) times a day. DVT prophylaxis after ankle fracture surgery with end date 8/13/2025.     ferrous sulfate 325 (65 FE) MG EC tablet  Take 1 tablet (325 mg total) by mouth once daily.     melatonin 3 mg tablet  Commonly known as: MELATIN  Take 2 tablets (6 mg total) by mouth nightly as needed for Insomnia.     methocarbamoL 500 MG Tab  Commonly known as: Robaxin  Take 1 tablet (500 mg total) by mouth 3 (three) times daily.     * oxyCODONE 5 MG immediate release tablet  Commonly known as: ROXICODONE  Take 1 tablet (5 mg total) by mouth every 4 (four) hours as needed (Moderate pain 4-6/10).     * oxyCODONE 10 mg Tab immediate release tablet  Commonly known as: ROXICODONE  Take 1 tablet (10 mg total) by mouth every 4 (four) hours as needed (Severe pain 7-10/10).     pregabalin 50 MG capsule  Commonly known as: LYRICA  Take 1 capsule (50 mg total) by mouth 2 (two) times daily.     vitamin D 1000 units Tab  Commonly known as: VITAMIN D3  Take 1 tablet (1,000 Units total) by mouth once daily.           * This list has 2 medication(s) that are the same as other medications prescribed for you. Read the directions carefully, and ask your doctor or other care provider to review them with you.                CHANGE how you take these medications      EScitalopram oxalate 10 MG tablet  Commonly known as: LEXAPRO  Take 1 tablet (10 mg total) by mouth once daily.  What changed: when to take this     sumatriptan 100 MG tablet  Commonly known as: IMITREX  Take 1 tablet (100 mg total) by mouth every 2 (two) hours as needed for Migraine.  What changed:   how much to take  how to take this  when to take this  reasons to take this            CONTINUE taking these medications      atorvastatin 20 MG tablet  Commonly known as: LIPITOR     hydroCHLOROthiazide 12.5 MG Tab  TAKE 1 TABLET BY MOUTH EVERY DAY     levothyroxine 125 MCG tablet  Commonly known as:  SYNTHROID  TAKE 1 TABLET BY MOUTH EVERY MORNING     losartan 100 MG tablet  Commonly known as: COZAAR  TAKE 1 TABLET BY MOUTH EVERY DAY     meloxicam 15 MG tablet  Commonly known as: MOBIC            STOP taking these medications      diclofenac 75 MG EC tablet  Commonly known as: VOLTAREN     EPINEPHrine 0.3 mg/0.3 mL Atin  Commonly known as: EPIPEN     finasteride 1 mg tablet  Commonly known as: PROPECIA     metaxalone 800 MG tablet  Commonly known as: SKELAXIN               Where to Get Your Medications        You can get these medications from any pharmacy    You don't need a prescription for these medications  aspirin 81 MG EC tablet       Information about where to get these medications is not yet available    Ask your nurse or doctor about these medications  acetaminophen 500 MG tablet  EScitalopram oxalate 10 MG tablet  ferrous sulfate 325 (65 FE) MG EC tablet  melatonin 3 mg tablet  methocarbamoL 500 MG Tab  oxyCODONE 10 mg Tab immediate release tablet  oxyCODONE 5 MG immediate release tablet  pregabalin 50 MG capsule  sumatriptan 100 MG tablet  vitamin D 1000 units Tab         Patient Instructions:  No discharge procedures on file.    At the time of discharge patient was told to take all medications as prescribed, to keep all followup appointments, and to call their primary care physician or return to the emergency room if they have any worsening or concerning symptoms.    I spent 35 minutes preparing the discharge      Signing Physician:  Lisa Bloom MD

## 2025-07-03 NOTE — PLAN OF CARE
Future Appointments   Date Time Provider Department Center   7/10/2025 10:30 AM Aubrey Monteiro NP [facility] [address]   7/24/2025  9:15 AM Yi Walter MD [facility] [address]   8/13/2025  9:30 AM Yi Walter MD [facility] [address]     [facility]  Discharge Final Note    Primary Care Provider: Erasmo Jones MD    Expected Discharge Date: 7/3/2025    Final Discharge Note (most recent)       Final Note - 07/03/25 1411          Final Note    Assessment Type Final Discharge Note     Anticipated Discharge Disposition Rehab Facility   [facility] Resources/Appts/Education Provided Provided patient/caregiver with written discharge plan information;Provided education on problems/symptoms using teachback;Appointments scheduled and added to AVS                   Important Message from Medicare  Important Message from Medicare regarding Discharge Appeal Rights: Given to patient/caregiver, Explained to patient/caregiver, Signed/date by patient/caregiver     Date IMM was signed: 07/03/25  Time IMM was signed: 0903     Follow-up providers       [facility] [address]   Specialty: Orthopedics    [address]  [facility] [address]   Phone: [phone]       Next Steps: Follow up    Instructions: 1week              After-discharge care                Destination       *[facility]   Service: Inpatient Rehabilitation    [address]  [facility] [address]   Phone: [phone]

## 2025-07-03 NOTE — HOSPITAL COURSE
Per chart review,    PT- 07/02    Functional Mobility:  Bed Mobility:     Scooting: supervision  Supine to Sit: supervision  Transfers:     Sit to Stand:  contact guard assistance with rolling walker, 1 trial from bed and 1 trial from chair  Gait: 20 ft total, Raf VALENTINE

## 2025-07-03 NOTE — CONSULTS
Inpatient consult to Physical Medicine Rehab  Consult performed by: Carlie Esqueda NP  Consult ordered by: Lisa Bloom MD      Consult received.     Carlie Esqueda NP  Physical Medicine & Rehabilitation   07/03/2025

## 2025-07-03 NOTE — PLAN OF CARE
07/03/25 0903   Medicare Message   Important Message from Medicare regarding Discharge Appeal Rights Explained to patient/caregiver;Signed/date by patient/caregiver;Given to patient/caregiver   Date IMM was signed 07/03/25   Time IMM was signed 0903

## 2025-07-03 NOTE — PLAN OF CARE
Per Toy may set up transportation for 3:30pm    Patient w/c transportation arranged through [facility] for 3:30pm  Pickup time cannot be guaranteed    Nurse can call report to [phone]     Patient, spouse, and nurse Ca notified of above

## 2025-07-03 NOTE — ASSESSMENT & PLAN NOTE
-S/P fall at home with subsequent Left ankle fracture.   -S/P ORIF 07/01.  -Post op BM pending.   -Pain appears controlled with PRN Oxycodone.  -PT/OT rec for HIT. Pt amenable. Making progress.  -LLE wound vac in place. Plan to continue prevana wound vac at rehab. Pt would need 1 week follow up with ortho. Notified [facility] team.   Touch down weight bearing for 6 weeks to LLE.

## 2025-07-03 NOTE — SUBJECTIVE & OBJECTIVE
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    Calcium channel blocker adverse reaction 07/22/2014    Gum disease      Hypertension     Thyroid disease      Past Surgical History:   Procedure Laterality Date    COSMETIC SURGERY      FIXATION OF SYNDESMOSIS OF ANKLE Left 7/1/2025    Procedure: FIXATION, SYNDESMOSIS, ANKLE;  Surgeon: Yi Walter MD;  Location: [facility] OR [address] [place] [address];  Service: Orthopedics;  Laterality: Left;    HYSTERECTOMY      OPEN REDUCTION AND INTERNAL FIXATION (ORIF) OF INJURY OF ANKLE Left 7/1/2025    Procedure: ORIF, ANKLE, LEFT;  Surgeon: Yi Walter MD;  Location: [facility] OR [address] [place] [address];  Service: Orthopedics;  Laterality: Left;     Review of patient's allergies indicates:   Allergen Reactions    Boniva [ibandronate] Other (See Comments)     SHOULDER PAIN    Demerol [meperidine]     Ramipril Swelling       Scheduled Medications:    acetaminophen  1,000 mg Oral Q8H    aspirin  81 mg Oral BID    atorvastatin  20 mg Oral QHS    EScitalopram oxalate  10 mg Oral Daily    ferrous sulfate  1 tablet Oral BID    levothyroxine  125 mcg Oral Before breakfast    methocarbamoL  500 mg Oral TID    pregabalin  50 mg Oral BID    vitamin D  1,000 Units Oral Daily       PRN Medications:   Current Facility-Administered Medications:     dextrose 50%, 12.5 g, Intravenous, PRN    dextrose 50%, 25 g, Intravenous, PRN    glucagon (human recombinant), 1 mg, Intramuscular, PRN    glucose, 16 g, Oral, PRN    glucose, 24 g, Oral, PRN    melatonin, 6 mg, Oral, Nightly PRN    naloxone, 0.02 mg, Intravenous, PRN    ondansetron, 4 mg, Intravenous, Q8H PRN    oxyCODONE, 5 mg, Oral, Q4H PRN    oxyCODONE, 10 mg, Oral, Q4H PRN    senna, 8.6 mg, Oral, Daily PRN    sodium chloride 0.9%, 10 mL, Intravenous, Q12H PRN    sumatriptan, 100 mg, Oral, Q2H PRN    Family History    None       Tobacco Use    Smoking status: Never    Smokeless tobacco: Never   Substance  and Sexual Activity    Alcohol use: Not Currently    Drug use: Never    Sexual activity: Yes     Partners: Male     Review of Systems   Constitutional:  Positive for activity change.   Musculoskeletal:  Positive for gait problem.   Skin:  Positive for wound.   Neurological:  Positive for weakness.     Objective:     Vital Signs (Most Recent):  Temp: 97.1 °F (36.2 °C) (07/03/25 1136)  Pulse: 65 (07/03/25 1136)  Resp: 17 (07/03/25 1136)  BP: (!) 128/58 (07/03/25 1136)  SpO2: 96 % (07/03/25 1136)    Vital Signs (24h Range):  Temp:  [97 °F (36.1 °C)-98.4 °F (36.9 °C)] 97.1 °F (36.2 °C)  Pulse:  [60-78] 65  Resp:  [15-18] 17  SpO2:  [92 %-96 %] 96 %  BP: (102-165)/(51-76) 128/58     Body mass index is 21.63 kg/m².     Physical Exam  Vitals and nursing note reviewed.   Constitutional:       General: She is awake.      Appearance: Normal appearance.   Pulmonary:      Effort: Pulmonary effort is normal.   Abdominal:      General: There is no distension.      Palpations: Abdomen is soft.   Musculoskeletal:         General: Normal range of motion.      Cervical back: Normal range of motion and neck supple.      Comments: LLE in a cast padding. With wound vac in place.    Skin:     General: Skin is warm and dry.      Capillary Refill: Capillary refill takes 2 to 3 seconds.      Comments: Wound vac to LLE   Neurological:      General: No focal deficit present.      Mental Status: She is alert and oriented to person, place, and time.      GCS: GCS eye subscore is 4. GCS verbal subscore is 5. GCS motor subscore is 6.      Motor: Weakness present.      Coordination: Coordination abnormal. Heel to Shin Test abnormal. Impaired rapid alternating movements.      Gait: Gait abnormal.   Psychiatric:         Behavior: Behavior is cooperative.          NEUROLOGICAL EXAMINATION:     MENTAL STATUS   Oriented to person, place, and time.       Diagnostic Results: Labs: Reviewed

## 2025-07-03 NOTE — CONSULTS
[facility] - Surgery  Physical Medicine & Rehab  Consult Note    Patient Name: Caity Ashby  MRN: [id number]  Admission Date: 6/21/2025  Hospital Length of Stay: 9 days  Attending Physician: Lisa Bloom MD   Consults  Subjective:     Principal Problem: Closed displaced trimalleolar fracture of left ankle    HPI: Per chart review, Caity Ashby is a 85 y.o. female with a PMHx of HTN, HLD, arthritis, anxiety/depression, hypothyroidism, and migraines who presented to the ED for obvious LLE deformity after a fall. On admit, pt was found with vulsion fracture involving the distal aspect of the fibula noting displacement of the distal fracture fragment. Pt is now S/P ORIF of left Ankle on 07/01/2025. PM &R was consulted to evaluate pt for post acute placement recommendation.       Functional History: Patient lives  with spouse  in a two  story home with threshold  to enter.  Prior to admission, Pt was I.  DME: None.      Hospital Course: Per chart review,    PT- 07/02    Functional Mobility:  Bed Mobility:     Scooting: supervision  Supine to Sit: supervision  Transfers:     Sit to Stand:  contact guard assistance with rolling walker, 1 trial from bed and 1 trial from chair  Gait: 20 ft total, RW, Raf    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    Calcium channel blocker adverse reaction 07/22/2014    Gum disease      Hypertension     Thyroid disease      Past Surgical History:   Procedure Laterality Date    COSMETIC SURGERY      FIXATION OF SYNDESMOSIS OF ANKLE Left 7/1/2025    Procedure: FIXATION, SYNDESMOSIS, ANKLE;  Surgeon: Yi Walter MD;  Location: [facility] OR [address] [place] [address];  Service: Orthopedics;  Laterality: Left;    HYSTERECTOMY      OPEN REDUCTION AND INTERNAL FIXATION (ORIF) OF INJURY OF ANKLE Left 7/1/2025    Procedure: ORIF, ANKLE, LEFT;  Surgeon: Yi Walter MD;  Location: [facility] OR [address] [place] [address];  Service:  Orthopedics;  Laterality: Left;     Review of patient's allergies indicates:   Allergen Reactions    Boniva [ibandronate] Other (See Comments)     SHOULDER PAIN    Demerol [meperidine]     Ramipril Swelling       Scheduled Medications:    acetaminophen  1,000 mg Oral Q8H    aspirin  81 mg Oral BID    atorvastatin  20 mg Oral QHS    EScitalopram oxalate  10 mg Oral Daily    ferrous sulfate  1 tablet Oral BID    levothyroxine  125 mcg Oral Before breakfast    methocarbamoL  500 mg Oral TID    pregabalin  50 mg Oral BID    vitamin D  1,000 Units Oral Daily       PRN Medications:   Current Facility-Administered Medications:     dextrose 50%, 12.5 g, Intravenous, PRN    dextrose 50%, 25 g, Intravenous, PRN    glucagon (human recombinant), 1 mg, Intramuscular, PRN    glucose, 16 g, Oral, PRN    glucose, 24 g, Oral, PRN    melatonin, 6 mg, Oral, Nightly PRN    naloxone, 0.02 mg, Intravenous, PRN    ondansetron, 4 mg, Intravenous, Q8H PRN    oxyCODONE, 5 mg, Oral, Q4H PRN    oxyCODONE, 10 mg, Oral, Q4H PRN    senna, 8.6 mg, Oral, Daily PRN    sodium chloride 0.9%, 10 mL, Intravenous, Q12H PRN    sumatriptan, 100 mg, Oral, Q2H PRN    Family History    None       Tobacco Use    Smoking status: Never    Smokeless tobacco: Never   Substance and Sexual Activity    Alcohol use: Not Currently    Drug use: Never    Sexual activity: Yes     Partners: Male     Review of Systems   Constitutional:  Positive for activity change.   Musculoskeletal:  Positive for gait problem.   Skin:  Positive for wound.   Neurological:  Positive for weakness.     Objective:     Vital Signs (Most Recent):  Temp: 97.1 °F (36.2 °C) (07/03/25 1136)  Pulse: 65 (07/03/25 1136)  Resp: 17 (07/03/25 1136)  BP: (!) 128/58 (07/03/25 1136)  SpO2: 96 % (07/03/25 1136)    Vital Signs (24h Range):  Temp:  [97 °F (36.1 °C)-98.4 °F (36.9 °C)] 97.1 °F (36.2 °C)  Pulse:  [60-78] 65  Resp:  [15-18] 17  SpO2:  [92 %-96 %] 96 %  BP: (102-165)/(51-76) 128/58     Body mass  index is 21.63 kg/m².     Physical Exam  Vitals and nursing note reviewed.   Constitutional:       General: She is awake.      Appearance: Normal appearance.   Pulmonary:      Effort: Pulmonary effort is normal.   Abdominal:      General: There is no distension.      Palpations: Abdomen is soft.   Musculoskeletal:         General: Normal range of motion.      Cervical back: Normal range of motion and neck supple.      Comments: LLE in a cast padding. With wound vac in place.    Skin:     General: Skin is warm and dry.      Capillary Refill: Capillary refill takes 2 to 3 seconds.      Comments: Wound vac to LLE   Neurological:      General: No focal deficit present.      Mental Status: She is alert and oriented to person, place, and time.      GCS: GCS eye subscore is 4. GCS verbal subscore is 5. GCS motor subscore is 6.      Motor: Weakness present.      Coordination: Coordination abnormal. Heel to Shin Test abnormal. Impaired rapid alternating movements.      Gait: Gait abnormal.   Psychiatric:         Behavior: Behavior is cooperative.          NEUROLOGICAL EXAMINATION:     MENTAL STATUS   Oriented to person, place, and time.       Diagnostic Results: Labs: Reviewed  Assessment/Plan:     * Closed displaced trimalleolar fracture of left ankle s/p ORIF on 7/1/2025  -S/P fall at home with subsequent Left ankle fracture.   -S/P ORIF 07/01.  -Post op BM pending.   -Pain appears controlled with PRN Oxycodone.  -PT/OT rec for HIT. Pt amenable. Making progress.  -LLE wound vac in place. Plan to continue prevana wound vac at rehab. Pt would need 1 week follow up with ortho. Notified [facility] team.   Touch down weight bearing for 6 weeks to LLE.     Other reduced mobility  See hospital course for functional status.    Recommendations  -  Encourage mobility, OOB in chair, and early ambulation as appropriate  -  PT/OT evaluate and treat  -  Pain management  -  DVT prophylaxis if appropriate   -  Monitor for and prevent skin  breakdown and pressure ulcers  Early mobility, repositioning/weight shifting every 20-30 minutes when sitting, turn patient every 2 hours, proper mattress/overlay and chair cushioning, pressure relief/heel protector boots     Stage 3a chronic kidney disease  -Monitor CMP daily.   -Avoid nephrotoxins.       PM&R Recommendation:     At this time, the PM&R team has reviewed this patient's ongoing medical case including inpatient diagnosis, medical history, clinical examination, labs, vitals, current social and functional history to provide the post-acute recommendation as follows:     RECOMMENDATIONS: inpatient rehabilitation due to fair to good potential for improvement with therapies and need of physician oversight for management of ongoing active medical issues, once medically stable. Pt is pending post op BM.       The patient will be admitted for comprehensive interdisciplinary inpatient rehabilitation to address the impairments due to her medical diagnosis of Closed displaced trimalleolar fracture of left ankle s/p ORIF on 7/1/2025.The patient will benefit from an inpatient rehabilitation program to promote functional recovery, implement compensatory strategies and will undergo assessment for needs for durable medical equipment for safe discharge to the community. This patient will benefit from a coordinated interdisciplinary rehabilitation program services that require close monitoring and treatment with 24-hour rehabilitative nursing and  physical/occupational therapies for 3 hours/day for 5 days/week. This interdisciplinary program will be performed under the direction of a physiatrist.        We will continue to follow.       Thank you for your consult.     Carlie Esqueda NP  Department of Physical Medicine & Rehab  [facility] - Surgery

## 2025-07-03 NOTE — PLAN OF CARE
Problem: Adult Inpatient Plan of Care  Goal: Plan of Care Review  Outcome: Progressing  Goal: Absence of Hospital-Acquired Illness or Injury  Outcome: Progressing  Goal: Optimal Comfort and Wellbeing  Outcome: Progressing  Goal: Readiness for Transition of Care  Outcome: Progressing     Problem: Adult Inpatient Plan of Care  Goal: Plan of Care Review  Outcome: Progressing     Problem: Adult Inpatient Plan of Care  Goal: Absence of Hospital-Acquired Illness or Injury  Outcome: Progressing     Problem: Adult Inpatient Plan of Care  Goal: Optimal Comfort and Wellbeing  Outcome: Progressing     Problem: Adult Inpatient Plan of Care  Goal: Readiness for Transition of Care  Outcome: Progressing    PRN medication effective for pain . Increases pain in right groin this shift No BM this shift  Explained plan of care, verbalized understanding.  Spouse at bedside Educated pt .on new medicine . No injury during shift, Side rails up x 2, call light by bedside.

## 2025-07-03 NOTE — HPI
Per chart review, Caity Ashby is a 85 y.o. female with a PMHx of HTN, HLD, arthritis, anxiety/depression, hypothyroidism, and migraines who presented to the ED for obvious LLE deformity after a fall. On admit, pt was found with vulsion fracture involving the distal aspect of the fibula noting displacement of the distal fracture fragment. Pt is now S/P ORIF of left Ankle on 07/01/2025. PM &R was consulted to evaluate pt for post acute placement recommendation.       Functional History: Patient lives  with spouse  in a two  story home with threshold  to enter.  Prior to admission, Pt was I.  DME: None.

## 2025-07-03 NOTE — PT/OT/SLP PROGRESS
Occupational Therapy      Patient Name:  Caity Ashby   MRN:  [id number]    Patient not seen today secondary to Other (Comment). Pt reported receiving an enema and wish to hold PM tx at this time. Also, Pt is scheduled for discharge today to next facility. Will follow-up at next date of service .    7/3/2025

## 2025-07-04 ENCOUNTER — NURSE TRIAGE (OUTPATIENT)
Dept: ADMINISTRATIVE | Facility: CLINIC | Age: 85
End: 2025-07-04
Payer: COMMERCIAL

## 2025-07-04 NOTE — TELEPHONE ENCOUNTER
Pts  called and stated pt is in inpatient rehab and asking about how to ice pts injury. Advised pt that pt has staff in the rehab facility with orders to follow.  verbalized understanding.  requesting call from doctor office on Monday when open.  Reason for Disposition   Health information question, no triage required and triager able to answer question    Protocols used: Information Only Call - No Triage-A-

## 2025-07-17 ENCOUNTER — TELEPHONE (OUTPATIENT)
Dept: ORTHOPEDICS | Facility: CLINIC | Age: 85
End: 2025-07-17
Payer: COMMERCIAL

## 2025-07-17 NOTE — TELEPHONE ENCOUNTER
Attempted to contact pt, no answer. LVM with reason for call and call back number    Copied from CRM #[id number]. Topic: Appointments - Appointment Access  >> Jul 17, 2025  2:22 PM Fany wrote:  Type:  Needs Medical Advice    Who Called: Caity  Would the patient rather a call back or a response via [facility]? call  Best Call Back Number: [phone]  Additional Information: requesting assistance for appointment on 7/24/25, needs help getting from car to office and from office back to car

## 2025-07-17 NOTE — TELEPHONE ENCOUNTER
Pt states that her dressing is not soiled. Dressing is just hanging and she needs it to be re wrapped so she can put on her boot. She states that she has been calling  and no one has answered or reached pack out to her. When asked about wound vac, pt states they took I off when she went back to the hospital =. States it did not work anyway.

## 2025-07-17 NOTE — TELEPHONE ENCOUNTER
Pt states that HH told her that she will be taken care of either today or tomorrow. Pt states that she is unhappy about how HH has been going. Will discuss when she gets to appointment.       Copied from CRM #[id number]. Topic: Appointments - Amb Referral  >> Jul 17, 2025  3:08 PM Blessing wrote:  .Who Called: Caity Ashby    Caller is requesting assistance/information from provider's office.    Symptoms (please be specific): asking for orders for home health orders to change the dressing . She has left previous messages and is disappointed that no one is returning her call to better explain      Preferred Method of Contact: Phone Call  Patient's Preferred Phone Number on File: [phone]

## 2025-07-21 NOTE — PROGRESS NOTES
Orthopedic Trauma Surgery Progress Note    Patient Problem(s): Left Trimalleolar ankle fracture   Date of Injury: 6/22/25  Mechanism of Injury: GLF  Treatment:   7/1/25 Open reduction internal fixation Left trimalleolar ankle fracture without fixation of the posterior malleolus (CPT 38866)   Open treatment of syndesmosis (CPT 17699)     Interval Present History: Caity is doing well 23 days out from surgery. She has been at home.  removed her Prevena and got her a boot. She has some drainage from her lateral incision and has had it covered.    Imaging: I have independently reviewed and interpreted this patient's imaging. XRs demonstrate maintenance of fracture reduction. The hardware remains intact with any loosening, shifting or breakage. There are no interval changes relative to the intraoperative fluoroscopy images.     PE:   The incision has completely healed, except for one very small < 1cm area centrally that has some granulation tissue present. When taking the sutures out, there was about 2-3cc of serous drainage from on of the suture holes.   Ankle ROM stiff  Mild bruising and swelling  Sensation intact in the sural, saphenous, SP and DP distributions  Toes well perfused  Demonstrates motor function of EHL/FHL        Assessment and Plan: Caity has delayed healing of her lateral incision. Given the serous drainage, I have recommended PO bactrim for 1 week to prevent infection. I also recommend betadine swabs with dry dressing changes. I will see her back for a wound check next week. They will be touch down weight bearing until the next appointment. Her  has liked mupirocin ointment in the past, so I have also written a prescription for that in case they would like to use that in addition. I have reached out to Home health to let them know.

## 2025-07-22 DIAGNOSIS — S82.852D CLOSED DISPLACED TRIMALLEOLAR FRACTURE OF LEFT ANKLE WITH ROUTINE HEALING, SUBSEQUENT ENCOUNTER: Primary | ICD-10-CM

## 2025-07-23 ENCOUNTER — TELEPHONE (OUTPATIENT)
Dept: ORTHOPEDICS | Facility: CLINIC | Age: 85
End: 2025-07-23
Payer: COMMERCIAL

## 2025-07-24 ENCOUNTER — TELEPHONE (OUTPATIENT)
Dept: ORTHOPEDICS | Facility: CLINIC | Age: 85
End: 2025-07-24
Payer: COMMERCIAL

## 2025-07-24 ENCOUNTER — HOSPITAL ENCOUNTER (OUTPATIENT)
Dept: RADIOLOGY | Facility: HOSPITAL | Age: 85
Discharge: HOME | End: 2025-07-24
Attending: STUDENT IN AN ORGANIZED HEALTH CARE EDUCATION/TRAINING PROGRAM
Payer: COMMERCIAL

## 2025-07-24 ENCOUNTER — OFFICE VISIT (OUTPATIENT)
Dept: ORTHOPEDICS | Facility: CLINIC | Age: 85
End: 2025-07-24
Payer: COMMERCIAL

## 2025-07-24 VITALS — DIASTOLIC BLOOD PRESSURE: 72 MMHG | TEMPERATURE: 98 F | HEART RATE: 77 BPM | SYSTOLIC BLOOD PRESSURE: 112 MMHG

## 2025-07-24 DIAGNOSIS — S82.852D CLOSED DISPLACED TRIMALLEOLAR FRACTURE OF LEFT ANKLE WITH ROUTINE HEALING, SUBSEQUENT ENCOUNTER: Primary | ICD-10-CM

## 2025-07-24 DIAGNOSIS — S82.852D CLOSED DISPLACED TRIMALLEOLAR FRACTURE OF LEFT ANKLE WITH ROUTINE HEALING, SUBSEQUENT ENCOUNTER: ICD-10-CM

## 2025-07-24 PROCEDURE — 73610 X-RAY EXAM OF ANKLE: CPT | Mod: 26,LT,, | Performed by: RADIOLOGY

## 2025-07-24 PROCEDURE — 3074F SYST BP LT 130 MM HG: CPT | Mod: CPTII,S$GLB,, | Performed by: STUDENT IN AN ORGANIZED HEALTH CARE EDUCATION/TRAINING PROGRAM

## 2025-07-24 PROCEDURE — 1159F MED LIST DOCD IN RCRD: CPT | Mod: CPTII,S$GLB,, | Performed by: STUDENT IN AN ORGANIZED HEALTH CARE EDUCATION/TRAINING PROGRAM

## 2025-07-24 PROCEDURE — 1160F RVW MEDS BY RX/DR IN RCRD: CPT | Mod: CPTII,S$GLB,, | Performed by: STUDENT IN AN ORGANIZED HEALTH CARE EDUCATION/TRAINING PROGRAM

## 2025-07-24 PROCEDURE — 99024 POSTOP FOLLOW-UP VISIT: CPT | Mod: S$GLB,,, | Performed by: STUDENT IN AN ORGANIZED HEALTH CARE EDUCATION/TRAINING PROGRAM

## 2025-07-24 PROCEDURE — 99999 PR PBB SHADOW E&M-EST. PATIENT-LVL IV: CPT | Mod: PBBFAC,,, | Performed by: STUDENT IN AN ORGANIZED HEALTH CARE EDUCATION/TRAINING PROGRAM

## 2025-07-24 PROCEDURE — 3078F DIAST BP <80 MM HG: CPT | Mod: CPTII,S$GLB,, | Performed by: STUDENT IN AN ORGANIZED HEALTH CARE EDUCATION/TRAINING PROGRAM

## 2025-07-24 PROCEDURE — 1126F AMNT PAIN NOTED NONE PRSNT: CPT | Mod: CPTII,S$GLB,, | Performed by: STUDENT IN AN ORGANIZED HEALTH CARE EDUCATION/TRAINING PROGRAM

## 2025-07-24 PROCEDURE — 73610 X-RAY EXAM OF ANKLE: CPT | Mod: TC,LT

## 2025-07-24 RX ORDER — SULFAMETHOXAZOLE AND TRIMETHOPRIM 800; 160 MG/1; MG/1
1 TABLET ORAL 2 TIMES DAILY
Qty: 14 TABLET | Refills: 0 | Status: SHIPPED | OUTPATIENT
Start: 2025-07-24

## 2025-07-24 RX ORDER — MUPIROCIN 20 MG/G
OINTMENT TOPICAL 3 TIMES DAILY
Qty: 15 G | Refills: 0 | Status: SHIPPED | OUTPATIENT
Start: 2025-07-24

## 2025-07-24 NOTE — TELEPHONE ENCOUNTER
Pt informed that appointment is scheduled for next Thursday.       Copied from CRM #[id number]. Topic: Appointments - Appointment Access  >> Jul 24, 2025 12:04 PM Fany wrote:  Type:  Needs Medical Appointment    Who Called: Caity  Symptoms (please be specific): post op     Would the patient rather a call back or a response via [facility]? call  Best Call Back Number: [phone]  Additional Information: Calling to schedule an appointment per one week follow up as soon as possible. Please call patient to schedule today.

## 2025-07-28 NOTE — PROGRESS NOTES
Orthopedic Trauma Surgery Progress Note    Patient Problem(s): Left Trimalleolar ankle fracture   Date of Injury: 6/22/25  Mechanism of Injury: GLF  Treatment:   7/1/25 Open reduction internal fixation Left trimalleolar ankle fracture without fixation of the posterior malleolus (CPT 72499)   Open treatment of syndesmosis (CPT 98901)     Interval Present History: Caity is 30 days out from surgery. She is here today for a skin check. As soon as I walk in to see Caity, she appears diaphoretic and unable to make eye contact. She is dropping in an out of consciousness and staring into space. Her  answers questions for her. He reports that she tramadol and other medication this morning, but they are unsure of what the medications are and how much tramadol she took. She denies chest pain, shortness of breath, dizziness and shortness of breath.      reports that over the last week they have not taken the antibiotics and have not done any dressing changes. He is very anxious about her situation at the moment. She has not had episodes like this before.     Imaging: No new today.     PE:   Periodically unresponsive with eyes open, when speaks asks questions and repeats herself.  Diaphoretic, pupils symmetric and reactive to light, not pin point    Vitals:    07/31/25 0942   BP: (!) 68/41   Pulse: 66   Resp: 10     Left ankle incisions are healing nicely, low concerns for wound infection.      Assessment and Plan: Caity is hypotensive and altered. We are going to escort her to the ER for further evaluation. Her incision is healing nicely. I don't think that she needs any further wound care, however her skin is very poor so she should keep the incision covered when in the boot. I will see her back in 3 weeks.

## 2025-07-30 ENCOUNTER — TELEPHONE (OUTPATIENT)
Dept: ORTHOPEDICS | Facility: CLINIC | Age: 85
End: 2025-07-30
Payer: COMMERCIAL

## 2025-07-30 NOTE — TELEPHONE ENCOUNTER
Pt states that someone called her & stated that they were calling about osteoporosis. No name or department was left in vm. Pt states that she will talk to Dr. Walter about it at her appointment tomorrow.       Copied from CRM #[id number]. Topic: General Inquiry - Patient Advice  >> Jul 30, 2025 10:44 AM Rosy wrote:  Consult/Advisory    Name Of Caller: Caity Ashby      Contact Preference:[phone] (home)      Nature of call: Pt is calling to speak with Dr. Hackett staff about Osteoporosis please call.

## 2025-07-31 ENCOUNTER — HOSPITAL ENCOUNTER (EMERGENCY)
Facility: HOSPITAL | Age: 85
Discharge: HOME | End: 2025-07-31
Attending: EMERGENCY MEDICINE
Payer: COMMERCIAL

## 2025-07-31 ENCOUNTER — OFFICE VISIT (OUTPATIENT)
Dept: ORTHOPEDICS | Facility: CLINIC | Age: 85
End: 2025-07-31
Payer: COMMERCIAL

## 2025-07-31 VITALS — RESPIRATION RATE: 10 BRPM | SYSTOLIC BLOOD PRESSURE: 68 MMHG | DIASTOLIC BLOOD PRESSURE: 41 MMHG | HEART RATE: 66 BPM

## 2025-07-31 VITALS
DIASTOLIC BLOOD PRESSURE: 73 MMHG | HEIGHT: 66 IN | HEART RATE: 63 BPM | BODY MASS INDEX: 21.53 KG/M2 | TEMPERATURE: 98 F | OXYGEN SATURATION: 97 % | SYSTOLIC BLOOD PRESSURE: 162 MMHG | RESPIRATION RATE: 19 BRPM | WEIGHT: 134 LBS

## 2025-07-31 DIAGNOSIS — S82.852D CLOSED DISPLACED TRIMALLEOLAR FRACTURE OF LEFT ANKLE WITH ROUTINE HEALING, SUBSEQUENT ENCOUNTER: Primary | ICD-10-CM

## 2025-07-31 DIAGNOSIS — I95.9 HYPOTENSION, UNSPECIFIED HYPOTENSION TYPE: Primary | ICD-10-CM

## 2025-07-31 DIAGNOSIS — Z13.6 SCREENING FOR CARDIOVASCULAR CONDITION: ICD-10-CM

## 2025-07-31 DIAGNOSIS — I95.9 HYPOTENSION: ICD-10-CM

## 2025-07-31 LAB
ABSOLUTE EOSINOPHIL (OHS): 0.3 K/UL
ABSOLUTE MONOCYTE (OHS): 0.7 K/UL (ref 0.3–1)
ABSOLUTE NEUTROPHIL COUNT (OHS): 7.93 K/UL (ref 1.8–7.7)
ALBUMIN SERPL BCP-MCNC: 3.4 G/DL (ref 3.5–5.2)
ALLENS TEST: NORMAL
ALP SERPL-CCNC: 111 UNIT/L (ref 40–150)
ALT SERPL W/O P-5'-P-CCNC: 35 UNIT/L (ref 0–55)
ANION GAP (OHS): 10 MMOL/L (ref 8–16)
AST SERPL-CCNC: 46 UNIT/L (ref 0–50)
BACTERIA #/AREA URNS AUTO: NORMAL /HPF
BASOPHILS # BLD AUTO: 0.07 K/UL
BASOPHILS NFR BLD AUTO: 0.7 %
BILIRUB SERPL-MCNC: 0.5 MG/DL (ref 0.1–1)
BILIRUB UR QL STRIP.AUTO: NEGATIVE
BUN SERPL-MCNC: 28 MG/DL (ref 8–23)
CALCIUM SERPL-MCNC: 9 MG/DL (ref 8.7–10.5)
CHLORIDE SERPL-SCNC: 98 MMOL/L (ref 95–110)
CLARITY UR: CLEAR
CO2 SERPL-SCNC: 23 MMOL/L (ref 23–29)
COLOR UR AUTO: YELLOW
CREAT SERPL-MCNC: 1.2 MG/DL (ref 0.5–1.4)
ERYTHROCYTE [DISTWIDTH] IN BLOOD BY AUTOMATED COUNT: 13.4 % (ref 11.5–14.5)
GFR SERPLBLD CREATININE-BSD FMLA CKD-EPI: 45 ML/MIN/1.73/M2
GLUCOSE SERPL-MCNC: 96 MG/DL (ref 70–110)
GLUCOSE UR QL STRIP: NEGATIVE
HCT VFR BLD AUTO: 33.9 % (ref 37–48.5)
HCV AB SERPL QL IA: NORMAL
HGB BLD-MCNC: 11.1 GM/DL (ref 12–16)
HGB UR QL STRIP: NEGATIVE
HIV 1+2 AB+HIV1 P24 AG SERPL QL IA: NORMAL
HYALINE CASTS UR QL AUTO: 1 /LPF (ref 0–1)
IMM GRANULOCYTES # BLD AUTO: 0.04 K/UL (ref 0–0.04)
IMM GRANULOCYTES NFR BLD AUTO: 0.4 % (ref 0–0.5)
KETONES UR QL STRIP: NEGATIVE
LDH SERPL L TO P-CCNC: 1.22 MMOL/L (ref 0.5–2.2)
LEUKOCYTE ESTERASE UR QL STRIP: ABNORMAL
LYMPHOCYTES # BLD AUTO: 1.33 K/UL (ref 1–4.8)
MCH RBC QN AUTO: 32.5 PG (ref 27–31)
MCHC RBC AUTO-ENTMCNC: 32.7 G/DL (ref 32–36)
MCV RBC AUTO: 99 FL (ref 82–98)
MICROSCOPIC COMMENT: NORMAL
NITRITE UR QL STRIP: NEGATIVE
NUCLEATED RBC (/100WBC) (OHS): 0 /100 WBC
OHS QRS DURATION: 86 MS
OHS QTC CALCULATION: 421 MS
PH UR STRIP: 6 [PH]
PLATELET # BLD AUTO: 381 K/UL (ref 150–450)
PMV BLD AUTO: 10 FL (ref 9.2–12.9)
POTASSIUM SERPL-SCNC: 4.8 MMOL/L (ref 3.5–5.1)
PROT SERPL-MCNC: 6.7 GM/DL (ref 6–8.4)
PROT UR QL STRIP: NEGATIVE
RBC # BLD AUTO: 3.42 M/UL (ref 4–5.4)
RBC #/AREA URNS AUTO: <1 /HPF (ref 0–4)
RELATIVE EOSINOPHIL (OHS): 2.9 %
RELATIVE LYMPHOCYTE (OHS): 12.8 % (ref 18–48)
RELATIVE MONOCYTE (OHS): 6.8 % (ref 4–15)
RELATIVE NEUTROPHIL (OHS): 76.4 % (ref 38–73)
SAMPLE: NORMAL
SITE: NORMAL
SODIUM SERPL-SCNC: 131 MMOL/L (ref 136–145)
SP GR UR STRIP: 1.01
SQUAMOUS #/AREA URNS AUTO: 4 /HPF
TSH SERPL-ACNC: 2.15 UIU/ML (ref 0.4–4)
UROBILINOGEN UR STRIP-ACNC: NEGATIVE EU/DL
WBC # BLD AUTO: 10.37 K/UL (ref 3.9–12.7)
WBC #/AREA URNS AUTO: 1 /HPF (ref 0–5)

## 2025-07-31 PROCEDURE — 93010 ELECTROCARDIOGRAM REPORT: CPT | Mod: ,,, | Performed by: INTERNAL MEDICINE

## 2025-07-31 PROCEDURE — 84443 ASSAY THYROID STIM HORMONE: CPT

## 2025-07-31 PROCEDURE — 87389 HIV-1 AG W/HIV-1&-2 AB AG IA: CPT | Performed by: PHYSICIAN ASSISTANT

## 2025-07-31 PROCEDURE — 83605 ASSAY OF LACTIC ACID: CPT

## 2025-07-31 PROCEDURE — 3078F DIAST BP <80 MM HG: CPT | Mod: CPTII,S$GLB,, | Performed by: STUDENT IN AN ORGANIZED HEALTH CARE EDUCATION/TRAINING PROGRAM

## 2025-07-31 PROCEDURE — 87040 BLOOD CULTURE FOR BACTERIA: CPT

## 2025-07-31 PROCEDURE — 96360 HYDRATION IV INFUSION INIT: CPT

## 2025-07-31 PROCEDURE — 85025 COMPLETE CBC W/AUTO DIFF WBC: CPT

## 2025-07-31 PROCEDURE — 1160F RVW MEDS BY RX/DR IN RCRD: CPT | Mod: CPTII,S$GLB,, | Performed by: STUDENT IN AN ORGANIZED HEALTH CARE EDUCATION/TRAINING PROGRAM

## 2025-07-31 PROCEDURE — 3074F SYST BP LT 130 MM HG: CPT | Mod: CPTII,S$GLB,, | Performed by: STUDENT IN AN ORGANIZED HEALTH CARE EDUCATION/TRAINING PROGRAM

## 2025-07-31 PROCEDURE — 86803 HEPATITIS C AB TEST: CPT | Performed by: PHYSICIAN ASSISTANT

## 2025-07-31 PROCEDURE — 99999 PR PBB SHADOW E&M-EST. PATIENT-LVL III: CPT | Mod: PBBFAC,,, | Performed by: STUDENT IN AN ORGANIZED HEALTH CARE EDUCATION/TRAINING PROGRAM

## 2025-07-31 PROCEDURE — 25000003 PHARM REV CODE 250

## 2025-07-31 PROCEDURE — 99024 POSTOP FOLLOW-UP VISIT: CPT | Mod: S$GLB,,, | Performed by: STUDENT IN AN ORGANIZED HEALTH CARE EDUCATION/TRAINING PROGRAM

## 2025-07-31 PROCEDURE — 93005 ELECTROCARDIOGRAM TRACING: CPT

## 2025-07-31 PROCEDURE — 99285 EMERGENCY DEPT VISIT HI MDM: CPT | Mod: 25

## 2025-07-31 PROCEDURE — 99900035 HC TECH TIME PER 15 MIN (STAT)

## 2025-07-31 PROCEDURE — 80053 COMPREHEN METABOLIC PANEL: CPT

## 2025-07-31 PROCEDURE — 1159F MED LIST DOCD IN RCRD: CPT | Mod: CPTII,S$GLB,, | Performed by: STUDENT IN AN ORGANIZED HEALTH CARE EDUCATION/TRAINING PROGRAM

## 2025-07-31 PROCEDURE — 81001 URINALYSIS AUTO W/SCOPE: CPT

## 2025-07-31 RX ORDER — SODIUM CHLORIDE 9 MG/ML
500 INJECTION, SOLUTION INTRAVENOUS
Status: DISCONTINUED | OUTPATIENT
Start: 2025-07-31 | End: 2025-07-31

## 2025-07-31 RX ORDER — DOXYCYCLINE 50 MG/1
50 TABLET ORAL 2 TIMES DAILY
COMMUNITY

## 2025-07-31 RX ADMIN — SODIUM CHLORIDE 500 ML: 9 INJECTION, SOLUTION INTRAVENOUS at 11:07

## 2025-08-01 LAB — HOLD SPECIMEN: NORMAL

## 2025-08-03 LAB — HOLD SPECIMEN: NORMAL

## 2025-08-05 ENCOUNTER — DOCUMENT SCAN (OUTPATIENT)
Dept: HOME HEALTH SERVICES | Facility: HOSPITAL | Age: 85
End: 2025-08-05
Payer: COMMERCIAL

## 2025-08-05 ENCOUNTER — PATIENT MESSAGE (OUTPATIENT)
Dept: ORTHOPEDICS | Facility: CLINIC | Age: 85
End: 2025-08-05
Payer: COMMERCIAL

## 2025-08-05 LAB
BACTERIA BLD CULT: NORMAL
BACTERIA BLD CULT: NORMAL

## 2025-08-11 DIAGNOSIS — S82.852D CLOSED DISPLACED TRIMALLEOLAR FRACTURE OF LEFT ANKLE WITH ROUTINE HEALING, SUBSEQUENT ENCOUNTER: Primary | ICD-10-CM

## 2025-08-12 DIAGNOSIS — S82.852D CLOSED DISPLACED TRIMALLEOLAR FRACTURE OF LEFT ANKLE WITH ROUTINE HEALING, SUBSEQUENT ENCOUNTER: Primary | ICD-10-CM

## 2025-08-21 ENCOUNTER — HOSPITAL ENCOUNTER (OUTPATIENT)
Dept: RADIOLOGY | Facility: HOSPITAL | Age: 85
Discharge: HOME | End: 2025-08-21
Attending: STUDENT IN AN ORGANIZED HEALTH CARE EDUCATION/TRAINING PROGRAM
Payer: COMMERCIAL

## 2025-08-21 ENCOUNTER — OFFICE VISIT (OUTPATIENT)
Dept: ORTHOPEDICS | Facility: CLINIC | Age: 85
End: 2025-08-21
Payer: COMMERCIAL

## 2025-08-21 VITALS — BODY MASS INDEX: 21.25 KG/M2 | HEIGHT: 66 IN | WEIGHT: 132.25 LBS

## 2025-08-21 DIAGNOSIS — S82.852D CLOSED DISPLACED TRIMALLEOLAR FRACTURE OF LEFT ANKLE WITH ROUTINE HEALING, SUBSEQUENT ENCOUNTER: Primary | ICD-10-CM

## 2025-08-21 DIAGNOSIS — S82.852D CLOSED DISPLACED TRIMALLEOLAR FRACTURE OF LEFT ANKLE WITH ROUTINE HEALING, SUBSEQUENT ENCOUNTER: ICD-10-CM

## 2025-08-21 PROCEDURE — 1160F RVW MEDS BY RX/DR IN RCRD: CPT | Mod: CPTII,S$GLB,, | Performed by: STUDENT IN AN ORGANIZED HEALTH CARE EDUCATION/TRAINING PROGRAM

## 2025-08-21 PROCEDURE — 73610 X-RAY EXAM OF ANKLE: CPT | Mod: TC,LT

## 2025-08-21 PROCEDURE — 73610 X-RAY EXAM OF ANKLE: CPT | Mod: 26,LT,, | Performed by: RADIOLOGY

## 2025-08-21 PROCEDURE — 1159F MED LIST DOCD IN RCRD: CPT | Mod: CPTII,S$GLB,, | Performed by: STUDENT IN AN ORGANIZED HEALTH CARE EDUCATION/TRAINING PROGRAM

## 2025-08-21 PROCEDURE — 99999 PR PBB SHADOW E&M-EST. PATIENT-LVL IV: CPT | Mod: PBBFAC,,, | Performed by: STUDENT IN AN ORGANIZED HEALTH CARE EDUCATION/TRAINING PROGRAM

## 2025-08-21 PROCEDURE — 1126F AMNT PAIN NOTED NONE PRSNT: CPT | Mod: CPTII,S$GLB,, | Performed by: STUDENT IN AN ORGANIZED HEALTH CARE EDUCATION/TRAINING PROGRAM

## 2025-08-21 PROCEDURE — 99024 POSTOP FOLLOW-UP VISIT: CPT | Mod: S$GLB,,, | Performed by: STUDENT IN AN ORGANIZED HEALTH CARE EDUCATION/TRAINING PROGRAM

## 2025-08-21 RX ORDER — DOXYCYCLINE 100 MG/1
100 CAPSULE ORAL EVERY 12 HOURS
Qty: 28 CAPSULE | Refills: 0 | Status: SHIPPED | OUTPATIENT
Start: 2025-08-21

## 2025-08-29 ENCOUNTER — DOCUMENT SCAN (OUTPATIENT)
Dept: HOME HEALTH SERVICES | Facility: HOSPITAL | Age: 85
End: 2025-08-29
Payer: COMMERCIAL

## 2025-09-03 ENCOUNTER — DOCUMENT SCAN (OUTPATIENT)
Dept: HOME HEALTH SERVICES | Facility: HOSPITAL | Age: 85
End: 2025-09-03
Payer: COMMERCIAL

## 2025-09-04 ENCOUNTER — OFFICE VISIT (OUTPATIENT)
Dept: ORTHOPEDICS | Facility: CLINIC | Age: 85
End: 2025-09-04
Payer: COMMERCIAL

## 2025-09-04 VITALS — HEIGHT: 66 IN | WEIGHT: 132.25 LBS | BODY MASS INDEX: 21.25 KG/M2

## 2025-09-04 DIAGNOSIS — S82.852D CLOSED DISPLACED TRIMALLEOLAR FRACTURE OF LEFT ANKLE WITH ROUTINE HEALING, SUBSEQUENT ENCOUNTER: Primary | ICD-10-CM

## 2025-09-04 PROCEDURE — 1160F RVW MEDS BY RX/DR IN RCRD: CPT | Mod: CPTII,S$GLB,, | Performed by: STUDENT IN AN ORGANIZED HEALTH CARE EDUCATION/TRAINING PROGRAM

## 2025-09-04 PROCEDURE — 1159F MED LIST DOCD IN RCRD: CPT | Mod: CPTII,S$GLB,, | Performed by: STUDENT IN AN ORGANIZED HEALTH CARE EDUCATION/TRAINING PROGRAM

## 2025-09-04 PROCEDURE — 99999 PR PBB SHADOW E&M-EST. PATIENT-LVL IV: CPT | Mod: PBBFAC,,, | Performed by: STUDENT IN AN ORGANIZED HEALTH CARE EDUCATION/TRAINING PROGRAM

## 2025-09-04 PROCEDURE — 1125F AMNT PAIN NOTED PAIN PRSNT: CPT | Mod: CPTII,S$GLB,, | Performed by: STUDENT IN AN ORGANIZED HEALTH CARE EDUCATION/TRAINING PROGRAM

## 2025-09-04 PROCEDURE — 99024 POSTOP FOLLOW-UP VISIT: CPT | Mod: S$GLB,,, | Performed by: STUDENT IN AN ORGANIZED HEALTH CARE EDUCATION/TRAINING PROGRAM

## 2025-09-04 RX ORDER — TRAMADOL HYDROCHLORIDE 50 MG/1
50 TABLET, FILM COATED ORAL EVERY 6 HOURS PRN
Qty: 42 TABLET | Refills: 0 | Status: SHIPPED | OUTPATIENT
Start: 2025-09-04

## 2025-09-04 RX ORDER — CLINDAMYCIN HYDROCHLORIDE 300 MG/1
300 CAPSULE ORAL 3 TIMES DAILY
Qty: 90 CAPSULE | Refills: 0 | Status: SHIPPED | OUTPATIENT
Start: 2025-09-04

## 2025-09-05 ENCOUNTER — TELEPHONE (OUTPATIENT)
Dept: ORTHOPEDICS | Facility: CLINIC | Age: 85
End: 2025-09-05
Payer: COMMERCIAL

## 2025-09-05 DIAGNOSIS — S82.852D CLOSED DISPLACED TRIMALLEOLAR FRACTURE OF LEFT ANKLE WITH ROUTINE HEALING, SUBSEQUENT ENCOUNTER: Primary | ICD-10-CM

## (undated) DEVICE — ELECTRODE MEGADYNE RETURN DUAL

## (undated) DEVICE — SPONGE COTTON TRAY 4X4IN

## (undated) DEVICE — DRAPE C-ARM ELAS CLIP 42X120IN

## (undated) DEVICE — BANDAGE ELAS SOFTWRAP ST 6X5YD

## (undated) DEVICE — SUT 2-0 MONOCRYL PLUS SH UD

## (undated) DEVICE — ELECTRODE REM PLYHSV RETURN 9

## (undated) DEVICE — PAD CAST SPECIALIST STRL 4

## (undated) DEVICE — TOURNIQUET SB QC DP 34X4IN

## (undated) DEVICE — BIT DRILL 2.0MM D DEPTH 110MM

## (undated) DEVICE — CANISTER INFOV.A.C WOUND 500ML

## (undated) DEVICE — DRAPE ORTH SPLIT 77X108IN

## (undated) DEVICE — SEE MEDLINE ITEM 157150

## (undated) DEVICE — Device

## (undated) DEVICE — BIT DRILL CALIB 80MM 2.5X170MM

## (undated) DEVICE — SUT ETHILON 3/0 18IN PS-1

## (undated) DEVICE — BANDAGE ESMARK 6X12

## (undated) DEVICE — TOWEL OR DISP STRL BLUE 4/PK

## (undated) DEVICE — DRAPE STERI U-SHAPED 47X51IN

## (undated) DEVICE — BIT DRILL CALIB QC 2.5X230MM

## (undated) DEVICE — TRAY MINOR ORTHO OMC

## (undated) DEVICE — CATH SUCTION 10FR

## (undated) DEVICE — PENCIL SMK EVAC CONNECTOR 10FT

## (undated) DEVICE — DRAPE C-ARMOR EQUIPMENT COVER

## (undated) DEVICE — DRAPE THREE-QTR REINF 53X77IN

## (undated) DEVICE — TAPE SURG DURAPORE 2 X10YD